# Patient Record
Sex: FEMALE | URBAN - METROPOLITAN AREA
[De-identification: names, ages, dates, MRNs, and addresses within clinical notes are randomized per-mention and may not be internally consistent; named-entity substitution may affect disease eponyms.]

---

## 2023-01-18 ENCOUNTER — INPATIENT (INPATIENT)
Facility: HOSPITAL | Age: 83
LOS: 6 days | Discharge: NURSING, REHAB OR LONG-TERM CARE | DRG: 871 | End: 2023-01-25
Attending: HOSPITALIST | Admitting: STUDENT IN AN ORGANIZED HEALTH CARE EDUCATION/TRAINING PROGRAM
Payer: MEDICARE

## 2023-01-18 VITALS
RESPIRATION RATE: 18 BRPM | SYSTOLIC BLOOD PRESSURE: 117 MMHG | DIASTOLIC BLOOD PRESSURE: 70 MMHG | TEMPERATURE: 98 F | HEART RATE: 99 BPM | OXYGEN SATURATION: 94 %

## 2023-01-18 DIAGNOSIS — Z98.891 HISTORY OF UTERINE SCAR FROM PREVIOUS SURGERY: Chronic | ICD-10-CM

## 2023-01-18 DIAGNOSIS — Z96.651 PRESENCE OF RIGHT ARTIFICIAL KNEE JOINT: Chronic | ICD-10-CM

## 2023-01-18 DIAGNOSIS — Z90.710 ACQUIRED ABSENCE OF BOTH CERVIX AND UTERUS: Chronic | ICD-10-CM

## 2023-01-18 DIAGNOSIS — H26.9 UNSPECIFIED CATARACT: Chronic | ICD-10-CM

## 2023-01-18 DIAGNOSIS — Z86.018 PERSONAL HISTORY OF OTHER BENIGN NEOPLASM: Chronic | ICD-10-CM

## 2023-01-18 LAB
ALBUMIN SERPL ELPH-MCNC: 3.2 G/DL — LOW (ref 3.3–5)
ALP SERPL-CCNC: 157 U/L — HIGH (ref 40–120)
ALT FLD-CCNC: 12 U/L — SIGNIFICANT CHANGE UP (ref 10–45)
ANION GAP SERPL CALC-SCNC: 18 MMOL/L — HIGH (ref 5–17)
APTT BLD: 33.7 SEC — SIGNIFICANT CHANGE UP (ref 27.5–35.5)
AST SERPL-CCNC: 40 U/L — SIGNIFICANT CHANGE UP (ref 10–40)
BASE EXCESS BLDV CALC-SCNC: -3.4 MMOL/L — LOW (ref -2–3)
BASOPHILS # BLD AUTO: 0.03 K/UL — SIGNIFICANT CHANGE UP (ref 0–0.2)
BASOPHILS NFR BLD AUTO: 0.2 % — SIGNIFICANT CHANGE UP (ref 0–2)
BILIRUB SERPL-MCNC: 1 MG/DL — SIGNIFICANT CHANGE UP (ref 0.2–1.2)
BUN SERPL-MCNC: 24 MG/DL — HIGH (ref 7–23)
CA-I SERPL-SCNC: 1.09 MMOL/L — LOW (ref 1.15–1.33)
CALCIUM SERPL-MCNC: 8.7 MG/DL — SIGNIFICANT CHANGE UP (ref 8.4–10.5)
CHLORIDE BLDV-SCNC: 95 MMOL/L — LOW (ref 96–108)
CHLORIDE SERPL-SCNC: 93 MMOL/L — LOW (ref 96–108)
CO2 BLDV-SCNC: 22 MMOL/L — SIGNIFICANT CHANGE UP (ref 22–26)
CO2 SERPL-SCNC: 19 MMOL/L — LOW (ref 22–31)
CREAT SERPL-MCNC: 1.9 MG/DL — HIGH (ref 0.5–1.3)
EGFR: 26 ML/MIN/1.73M2 — LOW
EOSINOPHIL # BLD AUTO: 0.08 K/UL — SIGNIFICANT CHANGE UP (ref 0–0.5)
EOSINOPHIL NFR BLD AUTO: 0.6 % — SIGNIFICANT CHANGE UP (ref 0–6)
FLUAV AG NPH QL: SIGNIFICANT CHANGE UP
FLUBV AG NPH QL: SIGNIFICANT CHANGE UP
GAS PNL BLDV: 129 MMOL/L — LOW (ref 136–145)
GAS PNL BLDV: SIGNIFICANT CHANGE UP
GLUCOSE BLDV-MCNC: 192 MG/DL — HIGH (ref 70–99)
GLUCOSE SERPL-MCNC: 192 MG/DL — HIGH (ref 70–99)
HCO3 BLDV-SCNC: 21 MMOL/L — LOW (ref 22–29)
HCT VFR BLD CALC: 36.1 % — SIGNIFICANT CHANGE UP (ref 34.5–45)
HCT VFR BLDA CALC: 36 % — SIGNIFICANT CHANGE UP (ref 34.5–46.5)
HGB BLD CALC-MCNC: 12.1 G/DL — SIGNIFICANT CHANGE UP (ref 11.7–16.1)
HGB BLD-MCNC: 11.6 G/DL — SIGNIFICANT CHANGE UP (ref 11.5–15.5)
IMM GRANULOCYTES NFR BLD AUTO: 1.2 % — HIGH (ref 0–0.9)
INR BLD: 1.23 RATIO — HIGH (ref 0.88–1.16)
LACTATE BLDV-MCNC: 1.7 MMOL/L — SIGNIFICANT CHANGE UP (ref 0.5–2)
LIDOCAIN IGE QN: 20 U/L — SIGNIFICANT CHANGE UP (ref 7–60)
LYMPHOCYTES # BLD AUTO: 1.47 K/UL — SIGNIFICANT CHANGE UP (ref 1–3.3)
LYMPHOCYTES # BLD AUTO: 11.4 % — LOW (ref 13–44)
MAGNESIUM SERPL-MCNC: 2.1 MG/DL — SIGNIFICANT CHANGE UP (ref 1.6–2.6)
MCHC RBC-ENTMCNC: 29.6 PG — SIGNIFICANT CHANGE UP (ref 27–34)
MCHC RBC-ENTMCNC: 32.1 GM/DL — SIGNIFICANT CHANGE UP (ref 32–36)
MCV RBC AUTO: 92.1 FL — SIGNIFICANT CHANGE UP (ref 80–100)
MONOCYTES # BLD AUTO: 1.18 K/UL — HIGH (ref 0–0.9)
MONOCYTES NFR BLD AUTO: 9.2 % — SIGNIFICANT CHANGE UP (ref 2–14)
NEUTROPHILS # BLD AUTO: 9.93 K/UL — HIGH (ref 1.8–7.4)
NEUTROPHILS NFR BLD AUTO: 77.4 % — HIGH (ref 43–77)
NRBC # BLD: 0 /100 WBCS — SIGNIFICANT CHANGE UP (ref 0–0)
PCO2 BLDV: 37 MMHG — LOW (ref 39–42)
PH BLDV: 7.37 — SIGNIFICANT CHANGE UP (ref 7.32–7.43)
PLATELET # BLD AUTO: 378 K/UL — SIGNIFICANT CHANGE UP (ref 150–400)
PO2 BLDV: 34 MMHG — SIGNIFICANT CHANGE UP (ref 25–45)
POTASSIUM BLDV-SCNC: 4 MMOL/L — SIGNIFICANT CHANGE UP (ref 3.5–5.1)
POTASSIUM SERPL-MCNC: 3.9 MMOL/L — SIGNIFICANT CHANGE UP (ref 3.5–5.3)
POTASSIUM SERPL-SCNC: 3.9 MMOL/L — SIGNIFICANT CHANGE UP (ref 3.5–5.3)
PROT SERPL-MCNC: 7.9 G/DL — SIGNIFICANT CHANGE UP (ref 6–8.3)
PROTHROM AB SERPL-ACNC: 14.3 SEC — HIGH (ref 10.5–13.4)
RBC # BLD: 3.92 M/UL — SIGNIFICANT CHANGE UP (ref 3.8–5.2)
RBC # FLD: 15.5 % — HIGH (ref 10.3–14.5)
RSV RNA NPH QL NAA+NON-PROBE: SIGNIFICANT CHANGE UP
SAO2 % BLDV: 52 % — LOW (ref 67–88)
SARS-COV-2 RNA SPEC QL NAA+PROBE: SIGNIFICANT CHANGE UP
SODIUM SERPL-SCNC: 130 MMOL/L — LOW (ref 135–145)
WBC # BLD: 12.84 K/UL — HIGH (ref 3.8–10.5)
WBC # FLD AUTO: 12.84 K/UL — HIGH (ref 3.8–10.5)

## 2023-01-18 PROCEDURE — 99285 EMERGENCY DEPT VISIT HI MDM: CPT | Mod: FS

## 2023-01-18 PROCEDURE — 76705 ECHO EXAM OF ABDOMEN: CPT | Mod: 26

## 2023-01-18 PROCEDURE — 74176 CT ABD & PELVIS W/O CONTRAST: CPT | Mod: 26,MA

## 2023-01-18 PROCEDURE — 71045 X-RAY EXAM CHEST 1 VIEW: CPT | Mod: 26

## 2023-01-18 RX ORDER — ERTAPENEM SODIUM 1 G/1
500 INJECTION, POWDER, LYOPHILIZED, FOR SOLUTION INTRAMUSCULAR; INTRAVENOUS ONCE
Refills: 0 | Status: COMPLETED | OUTPATIENT
Start: 2023-01-18 | End: 2023-01-18

## 2023-01-18 RX ORDER — ACETAMINOPHEN 500 MG
1000 TABLET ORAL ONCE
Refills: 0 | Status: COMPLETED | OUTPATIENT
Start: 2023-01-18 | End: 2023-01-18

## 2023-01-18 RX ADMIN — Medication 1000 MILLIGRAM(S): at 22:04

## 2023-01-18 RX ADMIN — Medication 400 MILLIGRAM(S): at 21:34

## 2023-01-18 RX ADMIN — ERTAPENEM SODIUM 100 MILLIGRAM(S): 1 INJECTION, POWDER, LYOPHILIZED, FOR SOLUTION INTRAMUSCULAR; INTRAVENOUS at 22:16

## 2023-01-18 NOTE — ED PROVIDER NOTE - ATTENDING APP SHARED VISIT CONTRIBUTION OF CARE
81 y/o female PMHx CHF, hypothyroidism, anxiety, HTN, HLD, vertigo, DM, and gastritis brought in via EMS for worsening right sided flank/RUQ pain x2 days with bedside us with gb dilated with no stone but slude, wall thickened borderline, no fevers, check official us, ct, labs, ua, possible choley for bilary colic, no cva tend, no hydronephrosis. 83 y/o female PMHx CHF, hypothyroidism, anxiety, HTN, HLD, vertigo, DM, and gastritis brought in via EMS for worsening right sided flank/RUQ pain x2 days with bedside us with gb dilated with no stone but slude, wall thickened borderline, no fevers, check official us, ct, labs, ua, possible choley for bilary colic, no cva tend, no hydronephrosis.

## 2023-01-18 NOTE — CONSULT NOTE ADULT - SUBJECTIVE AND OBJECTIVE BOX
History per daughter Dorota.  82F PMHx early dementia, CHF, hypothyroidism, anxiety, HTN, HLD, vertigo, DM, and gastritis with recent hospitalization 2022 at [facility] for urosepsis (fungal) requiring intubation. 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 Discharged  and at a rehab facility. Daughter reports pt's functional capacity declined severely since hospitalization. She now uses a wheelchair and has lost weight with poor appetite. Pt's care has been at [facility], but her PCP in rehab Dr. Santos recommended she come to [facility]. Pt has worsening r-sided abd pain since . Imaging suggests acute cholecystitis.      PAST MEDICAL & SURGICAL HISTORY:  H/O CHF      Hypothyroidism      H/O: HTN (hypertension)      HLD (hyperlipidemia)      H/O:           MEDICATIONS  (STANDING):  dextrose 5%. 1000 milliLiter(s) (100 mL/Hr) IV Continuous <Continuous>  dextrose 5%. 1000 milliLiter(s) (50 mL/Hr) IV Continuous <Continuous>  dextrose 50% Injectable 25 Gram(s) IV Push once  dextrose 50% Injectable 12.5 Gram(s) IV Push once  dextrose 50% Injectable 25 Gram(s) IV Push once  ertapenem  IVPB 500 milliGRAM(s) IV Intermittent every 24 hours  glucagon  Injectable 1 milliGRAM(s) IntraMuscular once  insulin lispro (ADMELOG) corrective regimen sliding scale   SubCutaneous every 6 hours    MEDICATIONS  (PRN):  dextrose Oral Gel 15 Gram(s) Oral once PRN Blood Glucose LESS THAN 70 milliGRAM(s)/deciliter      Allergies    No Known Allergies    Intolerances        Physical Exam:  General: fatigued, responds to questions  HEENT: NC/AT, EOMI  Pulmonary: normal resp effort, CTA-B  Abdominal: soft, ND, +[place]  Extremities: WWP, normal strength, no clubbing/cyanosis/edema  Neuro: CNs II-XII grossly intact, normal sensation, no focal deficits    Vital Signs Last 24 Hrs  T(C): 36.6 (2023 01:29), Max: 37.4 (2023 17:48)  T(F): 97.8 (2023 01:29), Max: 99.4 (2023 17:48)  HR: 91 (2023 01:29) (91 - 99)  BP: 104/57 (2023 01:29) (104/57 - 117/70)  BP(mean): 73 (2023 01:29) (73 - 73)  RR: 18 (2023 01:29) (18 - 20)  SpO2: 93% (2023 01:29) (93% - 94%)    Parameters below as of 2023 01:29  Patient On (Oxygen Delivery Method): room air        I&O's Summary          LABS:                        11.6   12.84 )-----------( 378      ( 2023 18:33 )             36.1     18    130<L>  |  93<L>  |  24<H>  ----------------------------<  192<H>  3.9   |  19<L>  |  1.90<H>    Ca    8.7      2023 18:33  Phos  4.2       Mg     2.1         TPro  7.9  /  Alb  3.2<L>  /  TBili  1.0  /  DBili  x   /  AST  40  /  ALT  12  /  AlkPhos  157<H>  18    PT/INR - ( 2023 21:55 )   PT: 14.3 sec;   INR: 1.23 ratio         PTT - ( 2023 21:55 )  PTT:33.7 sec    CAPILLARY BLOOD GLUCOSE      POCT Blood Glucose.: 181 mg/dL (2023 22:21)    LIVER FUNCTIONS - ( 2023 18:33 )  Alb: 3.2 g/dL / Pro: 7.9 g/dL / ALK PHOS: 157 U/L / ALT: 12 U/L / AST: 40 U/L / GGT: x           RADIOLOGY & ADDITIONAL STUDIES:  < from: CT Abdomen and Pelvis No Cont (23 @ 19:40) >  ACC: [id number] EXAM:  CT ABDOMEN AND PELVIS                          PROCEDURE DATE:  2023          INTERPRETATION:  CLINICAL INFORMATION: Right flank pain. Right upper   quadrant pain for 2 days.    < end of copied text >  < from: CT Abdomen and Pelvis No Cont (23 @ 19:40) >  IMPRESSION:    Limited noncontrast study.    Gallbladder distention with marked wall thickening and surrounding   inflammatory changes/fluid. Probable layering sludge. Findings are   concerning for acute cholecystitis. Correlate with same day ultrasound.    Retained contrast of bilateral renal parenchyma likelyreflects degree of   renal dysfunction/nephropathy, correlate for date of contrast   administration. Striated appearance of the kidneys, right greater than   left likely reflects infection/pyelonephritis.    Severe anterior wedging deformity of L3 vertebral body, favored to be   chronic. Correlate with clinical symptoms.    3 mm right middle lobe nodule can be followed dedicated chest CT in 12   months, or as per patient risk factors.    Additional findings as above. Please read above.      < end of copied text >  < from: US Abdomen Upper Quadrant Right (23 @ 19:02) >  ACC: [id number] EXAM:  US ABDOMEN RT UPR QUADRANT                          PROCEDURE DATE:  2023      < end of copied text >  < from: US Abdomen Upper Quadrant Right (23 @ 19:02) >  IMPRESSION:  Distended gallbladder and gallbladder wall thickening in the setting of   positive sonographic Pond sign compatible with acute cholecystitis.  Small right upper quadrant ascites.  Small right pleural effusion.      < end of copied text >   History per daughter Dorota.  82F PMHx early dementia, CHF, hypothyroidism, anxiety, HTN, HLD, vertigo, DM, and gastritis with recent hospitalization 2022 at [facility] for urosepsis (fungal) requiring intubation. 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 Discharged  and at a rehab facility. Daughter reports pt's functional capacity declined severely since hospitalization. She now uses a wheelchair and has lost weight with poor appetite. Pt's care has been at [facility], but her PCP in rehab Dr. Santos recommended she come to [facility]. Pt has worsening r-sided abd pain since . Imaging suggests acute cholecystitis.      PAST MEDICAL & SURGICAL HISTORY:  H/O CHF      Hypothyroidism      H/O: HTN (hypertension)      HLD (hyperlipidemia)      H/O:           MEDICATIONS  (STANDING):  dextrose 5%. 1000 milliLiter(s) (100 mL/Hr) IV Continuous <Continuous>  dextrose 5%. 1000 milliLiter(s) (50 mL/Hr) IV Continuous <Continuous>  dextrose 50% Injectable 25 Gram(s) IV Push once  dextrose 50% Injectable 12.5 Gram(s) IV Push once  dextrose 50% Injectable 25 Gram(s) IV Push once  ertapenem  IVPB 500 milliGRAM(s) IV Intermittent every 24 hours  glucagon  Injectable 1 milliGRAM(s) IntraMuscular once  insulin lispro (ADMELOG) corrective regimen sliding scale   SubCutaneous every 6 hours    MEDICATIONS  (PRN):  dextrose Oral Gel 15 Gram(s) Oral once PRN Blood Glucose LESS THAN 70 milliGRAM(s)/deciliter      Allergies    No Known Allergies    Intolerances        Physical Exam:  General: fatigued, responds to questions  HEENT: NC/AT, EOMI  Pulmonary: normal resp effort, CTA-B  Abdominal: soft, ND, +[place]  Extremities: WWP, normal strength, no clubbing/cyanosis/edema  Neuro: CNs II-XII grossly intact, normal sensation, no focal deficits    Vital Signs Last 24 Hrs  T(C): 36.6 (2023 01:29), Max: 37.4 (2023 17:48)  T(F): 97.8 (2023 01:29), Max: 99.4 (2023 17:48)  HR: 91 (2023 01:29) (91 - 99)  BP: 104/57 (2023 01:29) (104/57 - 117/70)  BP(mean): 73 (2023 01:29) (73 - 73)  RR: 18 (2023 01:29) (18 - 20)  SpO2: 93% (2023 01:29) (93% - 94%)    Parameters below as of 2023 01:29  Patient On (Oxygen Delivery Method): room air        I&O's Summary          LABS:                        11.6   12.84 )-----------( 378      ( 2023 18:33 )             36.1     18    130<L>  |  93<L>  |  24<H>  ----------------------------<  192<H>  3.9   |  19<L>  |  1.90<H>    Ca    8.7      2023 18:33  Phos  4.2       Mg     2.1         TPro  7.9  /  Alb  3.2<L>  /  TBili  1.0  /  DBili  x   /  AST  40  /  ALT  12  /  AlkPhos  157<H>  18    PT/INR - ( 2023 21:55 )   PT: 14.3 sec;   INR: 1.23 ratio         PTT - ( 2023 21:55 )  PTT:33.7 sec    CAPILLARY BLOOD GLUCOSE      POCT Blood Glucose.: 181 mg/dL (2023 22:21)    LIVER FUNCTIONS - ( 2023 18:33 )  Alb: 3.2 g/dL / Pro: 7.9 g/dL / ALK PHOS: 157 U/L / ALT: 12 U/L / AST: 40 U/L / GGT: x           RADIOLOGY & ADDITIONAL STUDIES:  < from: CT Abdomen and Pelvis No Cont (23 @ 19:40) >  ACC: [id number] EXAM:  CT ABDOMEN AND PELVIS                          PROCEDURE DATE:  2023          INTERPRETATION:  CLINICAL INFORMATION: Right flank pain. Right upper   quadrant pain for 2 days.    < end of copied text >  < from: CT Abdomen and Pelvis No Cont (23 @ 19:40) >  IMPRESSION:    Limited noncontrast study.    Gallbladder distention with marked wall thickening and surrounding   inflammatory changes/fluid. Probable layering sludge. Findings are   concerning for acute cholecystitis. Correlate with same day ultrasound.    Retained contrast of bilateral renal parenchyma likelyreflects degree of   renal dysfunction/nephropathy, correlate for date of contrast   administration. Striated appearance of the kidneys, right greater than   left likely reflects infection/pyelonephritis.    Severe anterior wedging deformity of L3 vertebral body, favored to be   chronic. Correlate with clinical symptoms.    3 mm right middle lobe nodule can be followed dedicated chest CT in 12   months, or as per patient risk factors.    Additional findings as above. Please read above.      < end of copied text >  < from: US Abdomen Upper Quadrant Right (23 @ 19:02) >  ACC: [id number] EXAM:  US ABDOMEN RT UPR QUADRANT                          PROCEDURE DATE:  2023      < end of copied text >  < from: US Abdomen Upper Quadrant Right (23 @ 19:02) >  IMPRESSION:  Distended gallbladder and gallbladder wall thickening in the setting of   positive sonographic Pond sign compatible with acute cholecystitis.  Small right upper quadrant ascites.  Small right pleural effusion.      < end of copied text >   History per daughter Dorota.  82F PMHx early dementia, CHF, hypothyroidism, anxiety, HTN, HLD, vertigo, DM, and gastritis with recent hospitalization 2022 at [facility] for urosepsis (fungal) requiring intubation. 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 Discharged  and at a rehab facility. Daughter reports pt's functional capacity declined severely since hospitalization. She now uses a wheelchair and has lost weight with poor appetite. Pt's care has been at [facility], but her PCP in rehab Dr. Santos recommended she come to [facility]. Pt has worsening r-sided abd pain since . Imaging suggests acute cholecystitis.      PAST MEDICAL & SURGICAL HISTORY:  H/O CHF      Hypothyroidism      H/O: HTN (hypertension)      HLD (hyperlipidemia)      H/O:           MEDICATIONS  (STANDING):  dextrose 5%. 1000 milliLiter(s) (100 mL/Hr) IV Continuous <Continuous>  dextrose 5%. 1000 milliLiter(s) (50 mL/Hr) IV Continuous <Continuous>  dextrose 50% Injectable 25 Gram(s) IV Push once  dextrose 50% Injectable 12.5 Gram(s) IV Push once  dextrose 50% Injectable 25 Gram(s) IV Push once  ertapenem  IVPB 500 milliGRAM(s) IV Intermittent every 24 hours  glucagon  Injectable 1 milliGRAM(s) IntraMuscular once  insulin lispro (ADMELOG) corrective regimen sliding scale   SubCutaneous every 6 hours    MEDICATIONS  (PRN):  dextrose Oral Gel 15 Gram(s) Oral once PRN Blood Glucose LESS THAN 70 milliGRAM(s)/deciliter      Allergies    No Known Allergies    Intolerances        Physical Exam:  General: fatigued, responds to questions  HEENT: NC/AT, EOMI  Pulmonary: normal resp effort, CTA-B  Abdominal: soft, ND, +[place]  Extremities: WWP, normal strength, no clubbing/cyanosis/edema  Neuro: CNs II-XII grossly intact, normal sensation, no focal deficits    Vital Signs Last 24 Hrs  T(C): 36.6 (2023 01:29), Max: 37.4 (2023 17:48)  T(F): 97.8 (2023 01:29), Max: 99.4 (2023 17:48)  HR: 91 (2023 01:29) (91 - 99)  BP: 104/57 (2023 01:29) (104/57 - 117/70)  BP(mean): 73 (2023 01:29) (73 - 73)  RR: 18 (2023 01:29) (18 - 20)  SpO2: 93% (2023 01:29) (93% - 94%)    Parameters below as of 2023 01:29  Patient On (Oxygen Delivery Method): room air        I&O's Summary          LABS:                        11.6   12.84 )-----------( 378      ( 2023 18:33 )             36.1     18    130<L>  |  93<L>  |  24<H>  ----------------------------<  192<H>  3.9   |  19<L>  |  1.90<H>    Ca    8.7      2023 18:33  Phos  4.2       Mg     2.1         TPro  7.9  /  Alb  3.2<L>  /  TBili  1.0  /  DBili  x   /  AST  40  /  ALT  12  /  AlkPhos  157<H>  18    PT/INR - ( 2023 21:55 )   PT: 14.3 sec;   INR: 1.23 ratio         PTT - ( 2023 21:55 )  PTT:33.7 sec    CAPILLARY BLOOD GLUCOSE      POCT Blood Glucose.: 181 mg/dL (2023 22:21)    LIVER FUNCTIONS - ( 2023 18:33 )  Alb: 3.2 g/dL / Pro: 7.9 g/dL / ALK PHOS: 157 U/L / ALT: 12 U/L / AST: 40 U/L / GGT: x           RADIOLOGY & ADDITIONAL STUDIES:  < from: CT Abdomen and Pelvis No Cont (23 @ 19:40) >  ACC: [id number] EXAM:  CT ABDOMEN AND PELVIS                          PROCEDURE DATE:  2023          INTERPRETATION:  CLINICAL INFORMATION: Right flank pain. Right upper   quadrant pain for 2 days.    < end of copied text >  < from: CT Abdomen and Pelvis No Cont (23 @ 19:40) >  IMPRESSION:    Limited noncontrast study.    Gallbladder distention with marked wall thickening and surrounding   inflammatory changes/fluid. Probable layering sludge. Findings are   concerning for acute cholecystitis. Correlate with same day ultrasound.    Retained contrast of bilateral renal parenchyma likelyreflects degree of   renal dysfunction/nephropathy, correlate for date of contrast   administration. Striated appearance of the kidneys, right greater than   left likely reflects infection/pyelonephritis.    Severe anterior wedging deformity of L3 vertebral body, favored to be   chronic. Correlate with clinical symptoms.    3 mm right middle lobe nodule can be followed dedicated chest CT in 12   months, or as per patient risk factors.    Additional findings as above. Please read above.      < end of copied text >  < from: US Abdomen Upper Quadrant Right (23 @ 19:02) >  ACC: [id number] EXAM:  US ABDOMEN RT UPR QUADRANT                          PROCEDURE DATE:  2023      < end of copied text >  < from: US Abdomen Upper Quadrant Right (23 @ 19:02) >  IMPRESSION:  Distended gallbladder and gallbladder wall thickening in the setting of   positive sonographic Pond sign compatible with acute cholecystitis.  Small right upper quadrant ascites.  Small right pleural effusion.      < end of copied text >

## 2023-01-18 NOTE — ED PROVIDER NOTE - NSICDXPASTMEDICALHX_GEN_ALL_CORE_FT
PAST MEDICAL HISTORY:  H/O CHF     H/O: HTN (hypertension)     HLD (hyperlipidemia)     Hypothyroidism

## 2023-01-18 NOTE — ED PROCEDURE NOTE - NS ED ATTENDING STATEMENT MOD
This was a shared visit with the NATHALIE. I reviewed and verified the documentation and independently performed the documented:
This was a shared visit with the NATHALIE. I reviewed and verified the documentation and independently performed the documented:

## 2023-01-18 NOTE — ED PROVIDER NOTE - INTERPRETATION
normal sinus rhythm, Normal axis, Normal RI interval and QRS complex. There are no acute ischemic ST or T-wave changes. normal sinus rhythm, Normal axis, Normal ME interval and QRS complex. There are no acute ischemic ST or T-wave changes. normal sinus rhythm, Normal axis, Normal LA interval and QRS complex. There are no acute ischemic ST or T-wave changes.

## 2023-01-18 NOTE — ED PROVIDER NOTE - OBJECTIVE STATEMENT
83 y/o female PMHx CHF, hypothyroidism, anxiety, HTN, HLD, vertigo, DM, and gastritis brought in via EMS for worsening right sided flank/RUQ pain x2 days. Patient reported the pain is constant and worsening with some nausea. Patient reports movement makes the pain worse. Chills but unsure of fever. Denied vomiting, changes in stool color or texture, CP, SOB, diarrhea, urinary symptoms, fever, headache, changes in skin color, bruising, fall or trauma 81 y/o female PMHx CHF, hypothyroidism, anxiety, HTN, HLD, vertigo, DM, and gastritis brought in via EMS for worsening right sided flank/RUQ pain x2 days. Patient reported the pain is constant and worsening with some nausea. Patient reports movement makes the pain worse. Chills but unsure of fever. Denied vomiting, changes in stool color or texture, CP, SOB, diarrhea, urinary symptoms, fever, headache, changes in skin color, bruising, fall or trauma

## 2023-01-18 NOTE — CONSULT NOTE ADULT - ASSESSMENT
82F PMHx early dementia, CHF, hypothyroidism, anxiety, HTN, HLD, vertigo, DM, and gastritis with recent hospitalization 11/2022 at [facility] for urosepsis (fungal) requiring intubation. 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 Discharged 12/5 and at a rehab facility. Daughter reports pt's functional capacity declined severely since hospitalization. She now uses a wheelchair and has lost weight with poor appetite. Pt's care has been at [facility], but her PCP in rehab Dr. Santos recommended she come to [facility]. Pt has worsening r-sided abd pain since Sunday. Imaging suggests acute cholecystitis.    PLAN  - No acute surgical intervention given pt comorbidities  - Need cardiac and medical optimization prior to any potential OR  - If too high risk, could consider IR perc cholecystostomy  - Monitor on IV abx  - GOC discussion with family    Discussed with Dr. HALINA Eng    Red Surgery  [id number]   82F PMHx early dementia, CHF, hypothyroidism, anxiety, HTN, HLD, vertigo, DM, and gastritis with recent hospitalization 11/2022 at [facility] for urosepsis (fungal) requiring intubation. 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 Discharged 12/5 and at a rehab facility. Daughter reports pt's functional capacity declined severely since hospitalization. She now uses a wheelchair and has lost weight with poor appetite. Pt's care has been at [facility], but her PCP in rehab Dr. Santos recommended she come to [facility]. Pt has worsening r-sided abd pain since Sunday. Imaging suggests acute cholecystitis.    PLAN  - No acute surgical intervention given pt comorbidities  - Need cardiac and medical optimization prior to any potential OR  - If too high risk, could consider IR perc cholecystostomy  - Monitor on IV abx  - GOC discussion with family    Discussed with Dr. HALINA Eng    Red Surgery  [id number]   82F PMHx early dementia, CHF, hypothyroidism, anxiety, HTN, HLD, vertigo, DM, and gastritis with recent hospitalization 11/2022 at [facility] for urosepsis (fungal) requiring intubation. 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 Discharged 12/5 and at a rehab facility. Daughter reports pt's functional capacity declined severely since hospitalization. She now uses a wheelchair and has lost weight with poor appetite. Pt's care has been at [facility], but her PCP in rehab Dr. Santos recommended she come to [facility]. Pt has worsening r-sided abd pain since Sunday. Imaging suggests acute cholecystitis.    PLAN  - No acute surgical intervention given pt comorbidities  - Need cardiac and medical optimization prior to any potential OR  - If too high risk, could consider IR perc cholecystostomy  - Monitor on IV abx  - GOC discussion with family    Discussed with Dr. HALINA Eng    Red Surgery  [id number]

## 2023-01-18 NOTE — ED PROCEDURE NOTE - PROCEDURE ADDITIONAL DETAILS
Emergency Department Focused Ultrasound performed at patient's bedside for educational purposes. An appropriate follow up study is ordered. -Jannet Bliss PA-C
Peripheral IV access in the Emergency Department obtained under dynamic ultrasound guidance with dark nonpulsatile blood return.  Catheter was flushed afterwards without any resistance or resulting extravasation.  IV catheter confirmed in compressible vein after insertion. 18 gauge catheter placed in a peripheral vein in the left upper extremity.

## 2023-01-18 NOTE — ED ADULT NURSE NOTE - NSIMPLEMENTINTERV_GEN_ALL_ED
Implemented All Fall Risk Interventions:  [place] to call system. Call bell, personal items and telephone within reach. Instruct patient to call for assistance. Room bathroom lighting operational. Non-slip footwear when patient is off stretcher. Physically safe environment: no spills, clutter or unnecessary equipment. Stretcher in lowest position, wheels locked, appropriate side rails in place. Provide visual cue, wrist band, yellow gown, etc. Monitor gait and stability. Monitor for mental status changes and reorient to person, place, and time. Review medications for side effects contributing to fall risk. Reinforce activity limits and safety measures with patient and family. Implemented All Fall Risk Interventions:  [place] to call system. Call bell, personal items and telephone within reach. Instruct patient to call for assistance. Room bathroom lighting operational. Non-slip footwear when patient is off stretcher. Physically safe environment: no spills, clutter or unnecessary equipment. Stretcher in lowest position, wheels locked, appropriate side rails in place. Provide visual cue, wrist band, yellow gown, etc. Monitor gait and stability. Monitor for mental status changes and reorient to person, place, and time. Review medications for side effects contributing to fall risk. Reinforce activity limits and safety measures with patient and family. Implemented All Fall Risk Interventions:  [place] to call system. Call bell, personal items and telephone within reach. Instruct patient to call for assistance. Room bathroom lighting operational. Non-slip footwear when patient is off stretcher. Physically safe environment: no spills, clutter or unnecessary equipment. Stretcher in lowest position, wheels locked, appropriate side rails in place. Provide visual cue, wrist band, yellow gown, etc. Monitor gait and stability. Monitor for mental status changes and reorient to person, place, and time. Review medications for side effects contributing to fall risk. Reinforce activity limits and safety measures with patient and family.

## 2023-01-18 NOTE — ED PROVIDER NOTE - PROGRESS NOTE DETAILS
JESSICA Martinez: spoke to pmd Dr. Munoz [phone] who treated pt on invanz 2 weeks for uti and ordered RUQ US two days with results pending. pt sent to hospital for worsening pain concerning or cholecystitis JESSICA Martinez: spoke to pmd Dr. Munoz [phone] who treated pt on invanz 2 weeks for uti and ordered RUQ US two days with results pending. pt sent to hospital for worsening pain concerning or cholecystitis JESSICA Martinez: spoke to pmd Dr. Munoz [phone] who treated pt on invanz 2 weeks for uti and ordered RUQ US two days with results pending. pt sent to hospital for worsening pain concerning or cholecystitis JESSICA Martinez: Tammy aware of radiology findings. paged surgery

## 2023-01-18 NOTE — CONSULT NOTE ADULT - ATTENDING COMMENTS
date of service 1/18/23    pt seen and examined  pt chart and imaging reviewed in detail  agree with note above  82F PMHx early dementia, CHF, hypothyroidism, anxiety, HTN, HLD, vertigo, DM, and gastritis with recent hospitalization 11/2022 at [facility] for urosepsis (fungal) requiring intubation. 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 Discharged 12/5 and at a rehab facility. Daughter reports pt's functional capacity declined severely since hospitalization. She now uses a wheelchair and has lost weight with poor appetite. Pt's care has been at [facility], but her PCP in rehab Dr. Santos recommended she come to [facility]. Pt has worsening r-sided abd pain since Sunday. Imaging suggests acute cholecystitis.   pt is poor operative candidate at this time.  agree with admit to medicine  cont npo, ivf, iv abx  serial abd exams  f/u labs in am  cont supportive care per med/cv  consider IR consult for perc cynthia  will follow with you  d/w pt & family @ bedside in detail. date of service 1/18/23    pt seen and examined  pt chart and imaging reviewed in detail  agree with note above  82F PMHx early dementia, CHF, hypothyroidism, anxiety, HTN, HLD, vertigo, DM, and gastritis with recent hospitalization 11/2022 at [facility] for urosepsis (fungal) requiring intubation. 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 Discharged 12/5 and at a rehab facility. Daughter reports pt's functional capacity declined severely since hospitalization. She now uses a wheelchair and has lost weight with poor appetite. Pt's care has been at [facility], but her PCP in rehab Dr. Santos recommended she come to [facility]. Pt has worsening r-sided abd pain since Sunday. Imaging suggests acute cholecystitis.   pt is poor operative candidate at this time.  agree with admit to medicine  cont npo, ivf, iv abx  serial abd exams  f/u labs in am  cont supportive care per med/cv  consider IR consult for perc cynthia  will follow with you  d/w pt & family @ bedside in detail. date of service 1/18/23    pt seen and examined  pt chart and imaging reviewed in detail  agree with note above  82F PMHx early dementia, CHF, hypothyroidism, anxiety, HTN, HLD, vertigo, DM, and gastritis with recent hospitalization 11/2022 at [facility] for urosepsis (fungal) requiring intubation. 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 Discharged 12/5 and at a rehab facility. Daughter reports pt's functional capacity declined severely since hospitalization. She now uses a wheelchair and has lost weight with poor appetite. Pt's care has been at [facility], but her PCP in rehab Dr. Santos recommended she come to [facility]. Pt has worsening r-sided abd pain since Sunday. Imaging suggests acute cholecystitis.   pt is poor operative candidate at this time.  agree with admit to medicine  cont npo, ivf, iv abx  serial abd exams  f/u labs in am  cont supportive care per med/cv  consider IR consult for perc cynthia  will follow with you  d/w pt & family @ bedside in detail.

## 2023-01-18 NOTE — ED ADULT NURSE NOTE - OBJECTIVE STATEMENT
Pt is a 83 yo female with the co abdominal pain since Monday. Pt states that she has been having lower abdominal pain radiating to the right flank area/ Pt reports intermittent nausea with the pain but denies any vomiting or diarrhea. Pt denies any CP or SOB no cough fever. some chills. Pt denies any recent travel or sick contacts.

## 2023-01-19 DIAGNOSIS — I67.9 CEREBROVASCULAR DISEASE, UNSPECIFIED: ICD-10-CM

## 2023-01-19 DIAGNOSIS — Z29.9 ENCOUNTER FOR PROPHYLACTIC MEASURES, UNSPECIFIED: ICD-10-CM

## 2023-01-19 DIAGNOSIS — E11.9 TYPE 2 DIABETES MELLITUS WITHOUT COMPLICATIONS: ICD-10-CM

## 2023-01-19 DIAGNOSIS — K81.9 CHOLECYSTITIS, UNSPECIFIED: ICD-10-CM

## 2023-01-19 DIAGNOSIS — E03.9 HYPOTHYROIDISM, UNSPECIFIED: ICD-10-CM

## 2023-01-19 DIAGNOSIS — Z86.79 PERSONAL HISTORY OF OTHER DISEASES OF THE CIRCULATORY SYSTEM: ICD-10-CM

## 2023-01-19 DIAGNOSIS — R91.8 OTHER NONSPECIFIC ABNORMAL FINDING OF LUNG FIELD: ICD-10-CM

## 2023-01-19 DIAGNOSIS — K81.0 ACUTE CHOLECYSTITIS: ICD-10-CM

## 2023-01-19 DIAGNOSIS — R63.4 ABNORMAL WEIGHT LOSS: ICD-10-CM

## 2023-01-19 DIAGNOSIS — K81.0 ACUTE CHOLECYSTITIS: Chronic | ICD-10-CM

## 2023-01-19 DIAGNOSIS — Z87.448 PERSONAL HISTORY OF OTHER DISEASES OF URINARY SYSTEM: ICD-10-CM

## 2023-01-19 LAB
ALBUMIN SERPL ELPH-MCNC: 2.9 G/DL — LOW (ref 3.3–5)
ALP SERPL-CCNC: 150 U/L — HIGH (ref 40–120)
ALT FLD-CCNC: 10 U/L — SIGNIFICANT CHANGE UP (ref 10–45)
ANION GAP SERPL CALC-SCNC: 18 MMOL/L — HIGH (ref 5–17)
AST SERPL-CCNC: 26 U/L — SIGNIFICANT CHANGE UP (ref 10–40)
BILIRUB SERPL-MCNC: 0.9 MG/DL — SIGNIFICANT CHANGE UP (ref 0.2–1.2)
BUN SERPL-MCNC: 24 MG/DL — HIGH (ref 7–23)
CALCIUM SERPL-MCNC: 8.8 MG/DL — SIGNIFICANT CHANGE UP (ref 8.4–10.5)
CHLORIDE SERPL-SCNC: 95 MMOL/L — LOW (ref 96–108)
CO2 SERPL-SCNC: 20 MMOL/L — LOW (ref 22–31)
CREAT SERPL-MCNC: 1.81 MG/DL — HIGH (ref 0.5–1.3)
EGFR: 27 ML/MIN/1.73M2 — LOW
GLUCOSE BLDC GLUCOMTR-MCNC: 181 MG/DL — HIGH (ref 70–99)
GLUCOSE BLDC GLUCOMTR-MCNC: 188 MG/DL — HIGH (ref 70–99)
GLUCOSE BLDC GLUCOMTR-MCNC: 196 MG/DL — HIGH (ref 70–99)
GLUCOSE SERPL-MCNC: 152 MG/DL — HIGH (ref 70–99)
HCT VFR BLD CALC: 37.4 % — SIGNIFICANT CHANGE UP (ref 34.5–45)
HGB BLD-MCNC: 11.8 G/DL — SIGNIFICANT CHANGE UP (ref 11.5–15.5)
MCHC RBC-ENTMCNC: 28.9 PG — SIGNIFICANT CHANGE UP (ref 27–34)
MCHC RBC-ENTMCNC: 31.6 GM/DL — LOW (ref 32–36)
MCV RBC AUTO: 91.4 FL — SIGNIFICANT CHANGE UP (ref 80–100)
NRBC # BLD: 0 /100 WBCS — SIGNIFICANT CHANGE UP (ref 0–0)
PLATELET # BLD AUTO: 372 K/UL — SIGNIFICANT CHANGE UP (ref 150–400)
POTASSIUM SERPL-MCNC: 4 MMOL/L — SIGNIFICANT CHANGE UP (ref 3.5–5.3)
POTASSIUM SERPL-SCNC: 4 MMOL/L — SIGNIFICANT CHANGE UP (ref 3.5–5.3)
PROT SERPL-MCNC: 7.4 G/DL — SIGNIFICANT CHANGE UP (ref 6–8.3)
RBC # BLD: 4.09 M/UL — SIGNIFICANT CHANGE UP (ref 3.8–5.2)
RBC # FLD: 15.5 % — HIGH (ref 10.3–14.5)
SODIUM SERPL-SCNC: 133 MMOL/L — LOW (ref 135–145)
TRIGL SERPL-MCNC: 150 MG/DL — HIGH
TSH SERPL-MCNC: 4.62 UIU/ML — HIGH (ref 0.27–4.2)
WBC # BLD: 10.81 K/UL — HIGH (ref 3.8–10.5)
WBC # FLD AUTO: 10.81 K/UL — HIGH (ref 3.8–10.5)

## 2023-01-19 PROCEDURE — 76775 US EXAM ABDO BACK WALL LIM: CPT | Mod: 26

## 2023-01-19 PROCEDURE — 99223 1ST HOSP IP/OBS HIGH 75: CPT

## 2023-01-19 PROCEDURE — 70450 CT HEAD/BRAIN W/O DYE: CPT | Mod: 26

## 2023-01-19 RX ORDER — LEVOTHYROXINE SODIUM 125 MCG
25 TABLET ORAL AT BEDTIME
Refills: 0 | Status: DISCONTINUED | OUTPATIENT
Start: 2023-01-19 | End: 2023-01-25

## 2023-01-19 RX ORDER — ERTAPENEM SODIUM 1 G/1
500 INJECTION, POWDER, LYOPHILIZED, FOR SOLUTION INTRAMUSCULAR; INTRAVENOUS EVERY 24 HOURS
Refills: 0 | Status: DISCONTINUED | OUTPATIENT
Start: 2023-01-19 | End: 2023-01-19

## 2023-01-19 RX ORDER — SODIUM CHLORIDE 9 MG/ML
1000 INJECTION, SOLUTION INTRAVENOUS
Refills: 0 | Status: DISCONTINUED | OUTPATIENT
Start: 2023-01-19 | End: 2023-01-25

## 2023-01-19 RX ORDER — HEPARIN SODIUM 5000 [USP'U]/ML
5000 INJECTION INTRAVENOUS; SUBCUTANEOUS
Refills: 0 | Status: DISCONTINUED | OUTPATIENT
Start: 2023-01-19 | End: 2023-01-25

## 2023-01-19 RX ORDER — DEXTROSE 50 % IN WATER 50 %
12.5 SYRINGE (ML) INTRAVENOUS ONCE
Refills: 0 | Status: DISCONTINUED | OUTPATIENT
Start: 2023-01-19 | End: 2023-01-25

## 2023-01-19 RX ORDER — SODIUM CHLORIDE 9 MG/ML
1000 INJECTION INTRAMUSCULAR; INTRAVENOUS; SUBCUTANEOUS
Refills: 0 | Status: DISCONTINUED | OUTPATIENT
Start: 2023-01-19 | End: 2023-01-25

## 2023-01-19 RX ORDER — INSULIN LISPRO 100/ML
VIAL (ML) SUBCUTANEOUS EVERY 6 HOURS
Refills: 0 | Status: DISCONTINUED | OUTPATIENT
Start: 2023-01-19 | End: 2023-01-20

## 2023-01-19 RX ORDER — HYDROMORPHONE HYDROCHLORIDE 2 MG/ML
0.5 INJECTION INTRAMUSCULAR; INTRAVENOUS; SUBCUTANEOUS ONCE
Refills: 0 | Status: DISCONTINUED | OUTPATIENT
Start: 2023-01-19 | End: 2023-01-19

## 2023-01-19 RX ORDER — PIPERACILLIN AND TAZOBACTAM 4; .5 G/20ML; G/20ML
3.38 INJECTION, POWDER, LYOPHILIZED, FOR SOLUTION INTRAVENOUS ONCE
Refills: 0 | Status: COMPLETED | OUTPATIENT
Start: 2023-01-19 | End: 2023-01-19

## 2023-01-19 RX ORDER — GLUCAGON INJECTION, SOLUTION 0.5 MG/.1ML
1 INJECTION, SOLUTION SUBCUTANEOUS ONCE
Refills: 0 | Status: DISCONTINUED | OUTPATIENT
Start: 2023-01-19 | End: 2023-01-25

## 2023-01-19 RX ORDER — ASPIRIN/CALCIUM CARB/MAGNESIUM 324 MG
81 TABLET ORAL DAILY
Refills: 0 | Status: DISCONTINUED | OUTPATIENT
Start: 2023-01-19 | End: 2023-01-25

## 2023-01-19 RX ORDER — PIPERACILLIN AND TAZOBACTAM 4; .5 G/20ML; G/20ML
3.38 INJECTION, POWDER, LYOPHILIZED, FOR SOLUTION INTRAVENOUS EVERY 8 HOURS
Refills: 0 | Status: DISCONTINUED | OUTPATIENT
Start: 2023-01-20 | End: 2023-01-25

## 2023-01-19 RX ORDER — DEXTROSE 50 % IN WATER 50 %
25 SYRINGE (ML) INTRAVENOUS ONCE
Refills: 0 | Status: DISCONTINUED | OUTPATIENT
Start: 2023-01-19 | End: 2023-01-25

## 2023-01-19 RX ORDER — TICAGRELOR 90 MG/1
90 TABLET ORAL EVERY 12 HOURS
Refills: 0 | Status: DISCONTINUED | OUTPATIENT
Start: 2023-01-19 | End: 2023-01-20

## 2023-01-19 RX ORDER — DEXTROSE 50 % IN WATER 50 %
15 SYRINGE (ML) INTRAVENOUS ONCE
Refills: 0 | Status: DISCONTINUED | OUTPATIENT
Start: 2023-01-19 | End: 2023-01-25

## 2023-01-19 RX ADMIN — PIPERACILLIN AND TAZOBACTAM 25 GRAM(S): 4; .5 INJECTION, POWDER, LYOPHILIZED, FOR SOLUTION INTRAVENOUS at 21:48

## 2023-01-19 RX ADMIN — PIPERACILLIN AND TAZOBACTAM 200 GRAM(S): 4; .5 INJECTION, POWDER, LYOPHILIZED, FOR SOLUTION INTRAVENOUS at 17:14

## 2023-01-19 RX ADMIN — Medication 81 MILLIGRAM(S): at 12:08

## 2023-01-19 RX ADMIN — TICAGRELOR 90 MILLIGRAM(S): 90 TABLET ORAL at 17:17

## 2023-01-19 RX ADMIN — HYDROMORPHONE HYDROCHLORIDE 0.5 MILLIGRAM(S): 2 INJECTION INTRAMUSCULAR; INTRAVENOUS; SUBCUTANEOUS at 13:19

## 2023-01-19 RX ADMIN — Medication 25 MICROGRAM(S): at 21:58

## 2023-01-19 RX ADMIN — HYDROMORPHONE HYDROCHLORIDE 0.5 MILLIGRAM(S): 2 INJECTION INTRAMUSCULAR; INTRAVENOUS; SUBCUTANEOUS at 14:00

## 2023-01-19 RX ADMIN — Medication 1: at 17:36

## 2023-01-19 RX ADMIN — SODIUM CHLORIDE 70 MILLILITER(S): 9 INJECTION INTRAMUSCULAR; INTRAVENOUS; SUBCUTANEOUS at 03:24

## 2023-01-19 RX ADMIN — Medication 1: at 07:29

## 2023-01-19 RX ADMIN — TICAGRELOR 90 MILLIGRAM(S): 90 TABLET ORAL at 05:42

## 2023-01-19 RX ADMIN — HEPARIN SODIUM 5000 UNIT(S): 5000 INJECTION INTRAVENOUS; SUBCUTANEOUS at 12:07

## 2023-01-19 RX ADMIN — Medication 1: at 12:07

## 2023-01-19 NOTE — H&P ADULT - PROBLEM SELECTOR PLAN 4
See above.   Echo.   On ASA and Brilinta.   Would clarify patient's Jardiance in the AM--to confirm use for CHF.   Would consider formal Cardiology opinion in the AM.

## 2023-01-19 NOTE — H&P ADULT - PROBLEM SELECTOR PLAN 1
See above.   Seen by general surgery.   Will continue with Invanz as above.    Would consider formal ID evaluation in the AM.    NPO x medications.

## 2023-01-19 NOTE — PROGRESS NOTE ADULT - SUBJECTIVE AND OBJECTIVE BOX
SUBJECTIVE:   Seen and examined.     OBJECTIVE: T(C): 36.6 (01-19-23 @ 04:49), Max: 37.4 (01-18-23 @ 17:48)  HR: 91 (01-19-23 @ 04:49) (90 - 99)  BP: 127/75 (01-19-23 @ 04:49) (104/57 - 127/75)  RR: 18 (01-19-23 @ 04:49) (18 - 20)  SpO2: 92% (01-19-23 @ 04:49) (92% - 94%)  Wt(kg): --  I&O's Summary    I&O's Detail    Physical Exam:  General: tired appearing   Pulmonary: normal resp effort  Abdominal: soft, ND, mildly tender on RUQ       MEDICATIONS  (STANDING):  aspirin enteric coated 81 milliGRAM(s) Oral daily  dextrose 5%. 1000 milliLiter(s) (100 mL/Hr) IV Continuous <Continuous>  dextrose 5%. 1000 milliLiter(s) (50 mL/Hr) IV Continuous <Continuous>  dextrose 50% Injectable 25 Gram(s) IV Push once  dextrose 50% Injectable 12.5 Gram(s) IV Push once  dextrose 50% Injectable 25 Gram(s) IV Push once  ertapenem  IVPB 500 milliGRAM(s) IV Intermittent every 24 hours  glucagon  Injectable 1 milliGRAM(s) IntraMuscular once  heparin   Injectable 5000 Unit(s) SubCutaneous <User Schedule>  insulin lispro (ADMELOG) corrective regimen sliding scale   SubCutaneous every 6 hours  levothyroxine Injectable 25 MICROGram(s) IV Push at bedtime  sodium chloride 0.9%. 1000 milliLiter(s) (70 mL/Hr) IV Continuous <Continuous>  ticagrelor 90 milliGRAM(s) Oral every 12 hours    MEDICATIONS  (PRN):  dextrose Oral Gel 15 Gram(s) Oral once PRN Blood Glucose LESS THAN 70 milliGRAM(s)/deciliter      LABS:                        11.8   10.81 )-----------( 372      ( 19 Jan 2023 07:45 )             37.4     01-19    133<L>  |  95<L>  |  24<H>  ----------------------------<  152<H>  4.0   |  20<L>  |  1.81<H>    Ca    8.8      19 Jan 2023 07:45  Phos  4.2     01-18  Mg     2.1     01-18    TPro  7.4  /  Alb  2.9<L>  /  TBili  0.9  /  DBili  x   /  AST  26  /  ALT  10  /  AlkPhos  150<H>  01-19    PT/INR - ( 18 Jan 2023 21:55 )   PT: 14.3 sec;   INR: 1.23 ratio         PTT - ( 18 Jan 2023 21:55 )  PTT:33.7 sec

## 2023-01-19 NOTE — PROGRESS NOTE ADULT - ASSESSMENT
82F PMHx early dementia, CHF, hypothyroidism, anxiety, HTN, HLD, vertigo, DM, and gastritis with recent hospitalization 11/2022 at [facility] for urosepsis (fungal) requiring intubation. 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 Discharged 12/5 and at a rehab facility. Daughter reports pt's functional capacity declined severely since hospitalization. She now uses a wheelchair and has lost weight with poor appetite. Pt's care has been at [facility], but her PCP in rehab Dr. Santos recommended she come to [facility]. Pt has worsening r-sided abd pain since Sunday. Imaging suggests acute cholecystitis.    PLAN  - No acute surgical intervention given pt comorbidities  - Continue IV Abx   - consider IR perc cholecystostomy  - Clarify [facility]       Red Surgery  [id number] 82F PMHx early dementia, CHF, hypothyroidism, anxiety, HTN, HLD, vertigo, DM, and gastritis with recent hospitalization 11/2022 at [facility] for urosepsis (fungal) requiring intubation. 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 Discharged 12/5 and at a rehab facility. Daughter reports pt's functional capacity declined severely since hospitalization. She now uses a wheelchair and has lost weight with poor appetite. Pt's care has been at [facility], but her PCP in rehab Dr. Santos recommended she come to [facility]. Pt has worsening r-sided abd pain since Sunday. Imaging suggests acute cholecystitis.    PLAN  - No acute surgical intervention given pt comorbidities  - Continue IV Abx   - consider IR perc cholecystostomy  - Clarify [facility]       Red Surgery  [id number] 82F PMHx early dementia, CHF, hypothyroidism, anxiety, HTN, HLD, vertigo, DM, and gastritis with recent hospitalization 11/2022 at [facility] for urosepsis (fungal) requiring intubation. 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 Discharged 12/5 and at a rehab facility. Daughter reports pt's functional capacity declined severely since hospitalization. She now uses a wheelchair and has lost weight with poor appetite. Pt's care has been at [facility], but her PCP in rehab Dr. Santos recommended she come to [facility]. Pt has worsening r-sided abd pain since Sunday. Imaging suggests acute cholecystitis.    PLAN  - No acute surgical intervention given pt comorbidities  - Continue IV Abx   - consider IR perc cholecystostomy  - Clarify [facility]       Red Surgery  [id number]

## 2023-01-19 NOTE — CONSULT NOTE ADULT - REASON FOR ADMISSION
Sent from Margaret Tietz [facility] for RUQ abdominal pain since 1/15/23 Sent from Margaret Tietz [facility] for RUQ abdominal pain since 1/15/23 Sent from Margaret Tietz [facility] for RUQ abdominal pain since 1/15/23

## 2023-01-19 NOTE — PATIENT PROFILE ADULT - SAFE PLACE TO LIVE
Negative for shortness of breath. Cardiovascular: Negative for chest pain and palpitations. Musculoskeletal: Negative for myalgias. Neurological: Negative for headaches. other review of systems reviewed and are negative    OBJECTIVE:  /82   Pulse 91   Temp 97 °F (36.1 °C)   Resp 14   Ht 6' 1\" (1.854 m)   Wt 257 lb (116.6 kg)   SpO2 98%   BMI 33.91 kg/m²      Physical Exam  Vitals signs reviewed. Eyes:      General: No scleral icterus. Conjunctiva/sclera: Conjunctivae normal.   Neck:      Musculoskeletal: Neck supple. Thyroid: No thyromegaly. Vascular: No carotid bruit. Cardiovascular:      Rate and Rhythm: Normal rate and regular rhythm. Heart sounds: No murmur. Pulmonary:      Effort: Pulmonary effort is normal.      Breath sounds: Normal breath sounds. No wheezing or rales. Abdominal:      General: Bowel sounds are normal.      Palpations: Abdomen is soft. There is no mass. Tenderness: There is no abdominal tenderness. There is no guarding or rebound. Musculoskeletal: Normal range of motion. Lymphadenopathy:      Cervical: No cervical adenopathy. Skin:     General: Skin is warm and dry. Neurological:      Mental Status: He is alert and oriented to person, place, and time. Cranial Nerves: No cranial nerve deficit. Psychiatric:         Mood and Affect: Mood normal.         ASSESSMENT AND PLAN:    Luan Lowe was seen today for 6 month follow-up, blood work and medication refill. Diagnoses and all orders for this visit:    Screening PSA (prostate specific antigen)  -     Psa screening; Future    Essential hypertension  -     losartan-hydrochlorothiazide (HYZAAR) 100-25 MG per tablet; Take 1 tablet by mouth daily  -     amLODIPine (NORVASC) 5 MG tablet; Take 1 tablet by mouth daily  -     Comprehensive Metabolic Panel; Future    Mixed hyperlipidemia  -     atorvastatin (LIPITOR) 20 MG tablet;  Take 1 tablet by mouth nightly    Facial tic    Other orders  -
no

## 2023-01-19 NOTE — H&P ADULT - REASON FOR ADMISSION
Sent from Margaret Tietz [facility] for RUQ abdominal pain since 1/15/23 Sent from Margaret Tietz [facility] for RUQ abdominal pain since 1/15/23 Sent from Margaret Tietz [facility] for RUQ abdominal pain since 1/15/23

## 2023-01-19 NOTE — H&P ADULT - PROBLEM SELECTOR PLAN 6
See above.    Would consider clarifying history and recent workup from last hospitalization at [facility]. See above.    Would consider clarifying history and recent workup from last hospitalization at [facility]. See above.    Would consider clarifying history and recent workup from last hospitalization at [facility].

## 2023-01-19 NOTE — CONSULT NOTE ADULT - ASSESSMENT
82 f with HTN, CAD, CHF, hospitalization at [facility] 11/2022 for septic shock due to citrobacter bacteremia and R pyelo c/b resp failure, MI and CVA, now p/w RUQ pain and nausea  here afebrile, WBC: 12  abd u/s:  Distended gallbladder and gallbladder wall thickening in the setting of positive sonographic Pond sign compatible with acute cholecystitis. Small right upper quadrant ascites.  abd/pelvis CT:  Gallbladder distention with marked wall thickening and surrounding inflammatory changes/fluid. Probable layering sludge, concerning for acute cholecystitis. Striated appearance of the kidneys, right greater than left likely reflects infection/pyelonephritis.     leukocytosis, RUQ pain due to acute cholecystitis    * surgery stated no surgical interventions in view pt's comorbidities  * IR eval for perc cynthia  * f/u the blood cx  * discontinue ertapenem and start zosyn    The above assessment and plan was discussed with the primary team    Daniella Ribeiro MD  contact on teams  After 5pm and on weekends call [phone]   82 f with HTN, CAD, CHF, hospitalization at [facility] 11/2022 for septic shock due to citrobacter bacteremia and R pyelo c/b resp failure, MI and CVA, now p/w RUQ pain and nausea  here afebrile, WBC: 12  abd u/s:  Distended gallbladder and gallbladder wall thickening in the setting of positive sonographic Pond sign compatible with acute cholecystitis. Small right upper quadrant ascites.  abd/pelvis CT:  Gallbladder distention with marked wall thickening and surrounding inflammatory changes/fluid. Probable layering sludge, concerning for acute cholecystitis. Striated appearance of the kidneys, right greater than left likely reflects infection/pyelonephritis.     leukocytosis, RUQ pain due to acute cholecystitis    * surgery stated no surgical interventions in view pt's comorbidities  * IR eval for perc cynthia  * f/u the blood cx  * discontinue ertapenem and start zosyn    The above assessment and plan was discussed with the primary team    Daniella Ribeiro MD  contact on teams  After 5pm and on weekends call [phone]   82 f with HTN, CAD, CHF, hospitalization at [facility] 11/2022 for septic shock due to citrobacter bacteremia and R pyelo c/b resp failure, MI and CVA, now p/w RUQ pain and nausea  here afebrile, WBC: 12  abd u/s:  Distended gallbladder and gallbladder wall thickening in the setting of positive sonographic Pond sign compatible with acute cholecystitis. Small right upper quadrant ascites.  abd/pelvis CT:  Gallbladder distention with marked wall thickening and surrounding inflammatory changes/fluid. Probable layering sludge, concerning for acute cholecystitis. Striated appearance of the kidneys, right greater than left likely reflects infection/pyelonephritis.     leukocytosis, RUQ pain due to acute cholecystitis    * surgery stated no surgical interventions in view pt's comorbidities  * IR eval for perc cynthia  * f/u the blood cx  * discontinue ertapenem and start zosyn    The above assessment and plan was discussed with the primary team    Daniella Ribeiro MD  contact on teams  After 5pm and on weekends call [phone]

## 2023-01-19 NOTE — PROGRESS NOTE ADULT - REASON FOR ADMISSION
Sent from Margaret Tietz [facility] for RUQ abdominal pain since 1/15/23 Sent from Margaret Tietz [facility] for RUQ abdominal pain since 1/15/23 Sent from Margaret Tietz [facility] for RUQ abdominal pain since 1/15/23

## 2023-01-19 NOTE — CONSULT NOTE ADULT - SUBJECTIVE AND OBJECTIVE BOX
Interventional Radiology  Evaluate for Procedure: Perc Cynthia    HPI: 82F PMHx early dementia, CHF, hypothyroidism, anxiety, HTN, HLD, vertigo, DM, and gastritis with recent hospitalization 11/2022 at [facility] for urosepsis (fungal) requiring intubation. 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 Discharged 12/5 and at a rehab facility. Daughter reports pt's functional capacity declined severely since hospitalization. She now uses a wheelchair and has lost weight with poor appetite. Pt's care has been at [facility], but her PCP in rehab Dr. Santos recommended she come to [facility]. Pt has worsening r-sided abd pain since Sunday. Imaging suggests acute cholecystitis. IR consulted for perc cynthia.     Allergies: NKDA  Medications (Abx/Cardiac/Anticoagulation/Blood Products)  aspirin enteric coated: 81 milliGRAM(s) Oral (01-19 @ 12:08)  ertapenem  IVPB: 100 mL/Hr IV Intermittent (01-18 @ 22:16)  heparin   Injectable: 5000 Unit(s) SubCutaneous (01-19 @ 12:07)  piperacillin/tazobactam IVPB.: 200 mL/Hr IV Intermittent (01-19 @ 17:14)  ticagrelor: 90 milliGRAM(s) Oral (01-19 @ 17:17)    Data:  T(C): 36.7  HR: 96  BP: 132/74  RR: 18  SpO2: 94%    -WBC 10.81 / HgB 11.8 / Hct 37.4 / Plt 372  -Na 133 / Cl 95 / BUN 24 / Glucose 152  -K 4.0 / CO2 20 / Cr 1.81  -ALT 10 / Alk Phos 150 / T.Bili 0.9  -INR 1.23 / PTT 33.7    Radiology: reviewed    Assessment/Plan: 82F PMHx early dementia, CHF, hypothyroidism, anxiety, HTN, HLD, vertigo, DM, and gastritis with recent hospitalization 11/2022 at [facility] for urosepsis (fungal) requiring intubation. 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 Discharged 12/5 and at a rehab facility. Daughter reports pt's functional capacity declined severely since hospitalization. She now uses a wheelchair and has lost weight with poor appetite. Pt's care has been at [facility], but her PCP in rehab Dr. Santos recommended she come to [facility]. Pt has worsening r-sided abd pain since Sunday. Imaging suggests acute cholecystitis. IR consulted for perc cynthia.     - case reviewed  - risk of bleeding outweighs benefit of procedure, recommend conservative/medical management   - if possible, consider changing to heparin gtt (brilinta would need to held x7days/ asa x5days due to high risk procedure)  - if patient decompensates please reconsult for re-evaluation of drainage  - d/w primary team    Keke Morrell NP  Available on Teams  Interventional Radiology  Evaluate for Procedure: Perc Cynthia    HPI: 82F PMHx early dementia, CHF, hypothyroidism, anxiety, HTN, HLD, vertigo, DM, and gastritis with recent hospitalization 11/2022 at [facility] for urosepsis (fungal) requiring intubation. 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 Discharged 12/5 and at a rehab facility. Daughter reports pt's functional capacity declined severely since hospitalization. She now uses a wheelchair and has lost weight with poor appetite. Pt's care has been at [facility], but her PCP in rehab Dr. Santos recommended she come to [facility]. Pt has worsening r-sided abd pain since Sunday. Imaging suggests acute cholecystitis. IR consulted for perc cynthia.     Allergies: NKDA  Medications (Abx/Cardiac/Anticoagulation/Blood Products)  aspirin enteric coated: 81 milliGRAM(s) Oral (01-19 @ 12:08)  ertapenem  IVPB: 100 mL/Hr IV Intermittent (01-18 @ 22:16)  heparin   Injectable: 5000 Unit(s) SubCutaneous (01-19 @ 12:07)  piperacillin/tazobactam IVPB.: 200 mL/Hr IV Intermittent (01-19 @ 17:14)  ticagrelor: 90 milliGRAM(s) Oral (01-19 @ 17:17)    Data:  T(C): 36.7  HR: 96  BP: 132/74  RR: 18  SpO2: 94%    -WBC 10.81 / HgB 11.8 / Hct 37.4 / Plt 372  -Na 133 / Cl 95 / BUN 24 / Glucose 152  -K 4.0 / CO2 20 / Cr 1.81  -ALT 10 / Alk Phos 150 / T.Bili 0.9  -INR 1.23 / PTT 33.7    Radiology: reviewed    Assessment/Plan: 82F PMHx early dementia, CHF, hypothyroidism, anxiety, HTN, HLD, vertigo, DM, and gastritis with recent hospitalization 11/2022 at [facility] for urosepsis (fungal) requiring intubation. 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 Discharged 12/5 and at a rehab facility. Daughter reports pt's functional capacity declined severely since hospitalization. She now uses a wheelchair and has lost weight with poor appetite. Pt's care has been at [facility], but her PCP in rehab Dr. Santos recommended she come to [facility]. Pt has worsening r-sided abd pain since Sunday. Imaging suggests acute cholecystitis. IR consulted for perc cynthia.     - case reviewed  - risk of bleeding outweighs benefit of procedure, recommend conservative/medical management   - if possible, consider changing to heparin gtt (brilinta would need to held x7days/ asa x5days due to high risk procedure)  - if patient decompensates please reconsult for re-evaluation of drainage  - d/w primary team    Keke Morrell NP  Available on Teams  Interventional Radiology  Evaluate for Procedure: Perc Cynthia    HPI: 82F PMHx early dementia, CHF, hypothyroidism, anxiety, HTN, HLD, vertigo, DM, and gastritis with recent hospitalization 11/2022 at [facility] for urosepsis (fungal) requiring intubation. 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 Discharged 12/5 and at a rehab facility. Daughter reports pt's functional capacity declined severely since hospitalization. She now uses a wheelchair and has lost weight with poor appetite. Pt's care has been at [facility], but her PCP in rehab Dr. Santos recommended she come to [facility]. Pt has worsening r-sided abd pain since Sunday. Imaging suggests acute cholecystitis. IR consulted for perc cynthia.     Allergies: NKDA  Medications (Abx/Cardiac/Anticoagulation/Blood Products)  aspirin enteric coated: 81 milliGRAM(s) Oral (01-19 @ 12:08)  ertapenem  IVPB: 100 mL/Hr IV Intermittent (01-18 @ 22:16)  heparin   Injectable: 5000 Unit(s) SubCutaneous (01-19 @ 12:07)  piperacillin/tazobactam IVPB.: 200 mL/Hr IV Intermittent (01-19 @ 17:14)  ticagrelor: 90 milliGRAM(s) Oral (01-19 @ 17:17)    Data:  T(C): 36.7  HR: 96  BP: 132/74  RR: 18  SpO2: 94%    -WBC 10.81 / HgB 11.8 / Hct 37.4 / Plt 372  -Na 133 / Cl 95 / BUN 24 / Glucose 152  -K 4.0 / CO2 20 / Cr 1.81  -ALT 10 / Alk Phos 150 / T.Bili 0.9  -INR 1.23 / PTT 33.7    Radiology: reviewed    Assessment/Plan: 82F PMHx early dementia, CHF, hypothyroidism, anxiety, HTN, HLD, vertigo, DM, and gastritis with recent hospitalization 11/2022 at [facility] for urosepsis (fungal) requiring intubation. 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 Discharged 12/5 and at a rehab facility. Daughter reports pt's functional capacity declined severely since hospitalization. She now uses a wheelchair and has lost weight with poor appetite. Pt's care has been at [facility], but her PCP in rehab Dr. Santos recommended she come to [facility]. Pt has worsening r-sided abd pain since Sunday. Imaging suggests acute cholecystitis. IR consulted for perc cynthia.     - case reviewed  - risk of bleeding outweighs benefit of procedure, recommend conservative/medical management   - if possible, consider changing to heparin gtt (brilinta would need to held x7days/ asa x5days due to high risk procedure)  - if patient decompensates please reconsult for re-evaluation of drainage  - d/w primary team    Keke Morrell NP  Available on Teams

## 2023-01-19 NOTE — H&P ADULT - NSHPADDITIONALINFOADULT_GEN_ALL_CORE
NIGHT HOSPITALIST:     Daughter aware of course and agrees with plan/care as above.   Given patient's comorbidities, patient's long term prognosis is guarded.    Emotional support provided to patient/daughter.   The daughter is not yet ready to discuss advance directives.   Care reviewed with covering NP/PA for endorsement to my physician colleagues in the AM.    Pedro Sanders MD  Available on [employer] Teams until 1/19/23  4753 NIGHT HOSPITALIST:     Daughter aware of course and agrees with plan/care as above.   Given patient's comorbidities, patient's long term prognosis is guarded.    Emotional support provided to patient/daughter.   The daughter is not yet ready to discuss advance directives.   Care reviewed with covering NP/PA for endorsement to my physician colleagues in the AM.    Pedro Sanders MD  Available on [employer] Teams until 1/19/23  4329 NIGHT HOSPITALIST:     Daughter aware of course and agrees with plan/care as above.   Given patient's comorbidities, patient's long term prognosis is guarded.    Emotional support provided to patient/daughter.   The daughter is not yet ready to discuss advance directives.   Care reviewed with covering NP/PA for endorsement to my physician colleagues in the AM.    Pedro Sanders MD  Available on [employer] Teams until 1/19/23  1430 NIGHT HOSPITALIST:     Daughter aware of course and agrees with plan/care as above.   Given patient's comorbidities, patient's long term prognosis is guarded.    Emotional support provided to patient/daughter.   The daughter is not yet ready to discuss advance directives.   Care reviewed with covering NP, Phlylis  for endorsement to my physician colleagues in the AM.    Pedro Sanders MD  Available on [employer] Teams until 1/19/23  4029 NIGHT HOSPITALIST:     Daughter aware of course and agrees with plan/care as above.   Given patient's comorbidities, patient's long term prognosis is guarded.    Emotional support provided to patient/daughter.   The daughter is not yet ready to discuss advance directives.   Care reviewed with covering NP, Phyllis  for endorsement to my physician colleagues in the AM.    Pedro Sanders MD  Available on Microsoft Teams until 1/19/23  4123 NIGHT HOSPITALIST:     Daughter aware of course and agrees with plan/care as above.   Given patient's comorbidities, patient's long term prognosis is guarded.    Emotional support provided to patient/daughter.   The daughter is not yet ready to discuss advance directives.   Care reviewed with covering NP, Phyllis  for endorsement to my physician colleagues in the AM.    Pedro Sanders MD  Available on [employer] Teams until 1/19/23  5418

## 2023-01-19 NOTE — H&P ADULT - NSHPSOCIALHISTORY_GEN_ALL_CORE
Past remote ethanol, negative CAGE.  Former smoker.    Lived alone previously in her apartment but now resident at [facility].    Supportive adult daughter.

## 2023-01-19 NOTE — H&P ADULT - NSHPSOURCEINFOTX_GEN_ALL_CORE
Limited history from patient, who converses in English.  Most of history obtained from patient's daughter, Dorota López, [phone] Limited history from patient, who converses in English.  Most of history obtained from patient's daughter, Dorota López, [phone] Limited history from patient, who converses in English.  Most of history obtained from patient's daughter, Dorota López, [phone]

## 2023-01-19 NOTE — CONSULT NOTE ADULT - REASON FOR ADMISSION
Sent from [facility] for RUQ abdominal pain since 1/15/23 Sent from [facility] for RUQ abdominal pain since 1/15/23 Sent from [facility] for RUQ abdominal pain since 1/15/23

## 2023-01-19 NOTE — H&P ADULT - NSHPOUTPATIENTPROVIDERS_GEN_ALL_CORE
Wale Huffman MD (Pulmonary) [phone] Wale Huffman MD (Pulmonary) [phone] Wale Huffman MD (Pulmonary) [phone]

## 2023-01-19 NOTE — PATIENT PROFILE ADULT - FALL HARM RISK - HARM RISK INTERVENTIONS
Assistance with ambulation/Assistance OOB with selected safe patient handling equipment/Communicate Risk of Fall with Harm to all staff/Reinforce activity limits and safety measures with patient and family/Tailored Fall Risk Interventions/Visual Cue: Yellow wristband and red socks/Bed in lowest position, wheels locked, appropriate side rails in place/Call bell, personal items and telephone in reach/Instruct patient to call for assistance before getting out of bed or chair/Non-slip footwear when patient is out of bed/[place] to call system/Physically safe environment - no spills, clutter or unnecessary equipment/Purposeful Proactive Rounding/Room/bathroom lighting operational, light cord in reach Assistance with ambulation/Assistance OOB with selected safe patient handling equipment/Communicate Risk of Fall with Harm to all staff/Reinforce activity limits and safety measures with patient and family/Tailored Fall Risk Interventions/Visual Cue: Yellow wristband and red socks/Bed in lowest position, wheels locked, appropriate side rails in place/Call bell, personal items and telephone in reach/Instruct patient to call for assistance before getting out of bed or chair/Non-slip footwear when patient is out of bed/[place] to call system/Physically safe environment - no spills, clutter or unnecessary equipment/Purposeful Proactive Rounding/Room/bathroom lighting operational, light cord in reach Assistance with ambulation/Assistance OOB with selected safe patient handling equipment/Communicate Risk of Fall with Harm to all staff/Reinforce activity limits and safety measures with patient and family/Tailored Fall Risk Interventions/Visual Cue: Yellow wristband and red socks/Bed in lowest position, wheels locked, appropriate side rails in place/Call bell, personal items and telephone in reach/Instruct patient to call for assistance before getting out of bed or chair/Non-slip footwear when patient is out of bed/[place] to call system/Physically safe environment - no spills, clutter or unnecessary equipment/Purposeful Proactive Rounding/Room/bathroom lighting operational, light cord in reach

## 2023-01-19 NOTE — H&P ADULT - NSHPLABSRESULTS_GEN_ALL_CORE
EKG tracing interpreted by me with NSR at 97 with nonspecific ST T changes.    Chest radiograph interpreted by me with no infiltrate or effusion.    WBC 12.8   77N    Hgb 11.6    Platelets of 378K    INR 1.23    Random glucose of 192  K+ 3.9  Cr 1.90  (no old Cr available)    AG 18    Alb 3.2    Alk phos 157    AST 40  ALT 12    RVP and COVID-19 PCR>>negative.    Triglycerides 150 EKG tracing interpreted by me with NSR at 97 with nonspecific ST T changes.    Chest radiograph interpreted by me with no infiltrate or effusion.    WBC 12.8   77N    Hgb 11.6    Platelets of 378K    INR 1.23    Random glucose of 192  K+ 3.9  Cr 1.90  (no old Cr available)    AG 18    Alb 3.2    Alk phos 157    AST 40  ALT 12    RVP and COVID-19 PCR>>negative.    Triglycerides 150    CTT abdomen with no contrast with GB distention with marked wall thickening and surrounding inflammatory changes, concerning for acute cholecystitis.  Retained contrast of B/L renal parenchyma with RIGHT >left striated appearance consistent with pyelonephritis, 3 mm RML nodule (this last finding was reviewed with the patient's daughter--daughter also reports patient with prior CTT abdomen study with contrast as an outpatient by a urologist in [place]).    RUQ US with distended GB and sonographic sign [facility] EKG tracing interpreted by me with NSR at 97 with nonspecific ST T changes.    Chest radiograph interpreted by me with no infiltrate or effusion.    WBC 12.8   77N    Hgb 11.6    Platelets of 378K    INR 1.23    Random glucose of 192  K+ 3.9  Cr 1.90  (no old Cr available)    AG 18    Alb 3.2    Alk phos 157    AST 40  ALT 12    RVP and COVID-19 PCR>>negative.    Triglycerides 150    CTT abdomen with no contrast with GB distention with marked wall thickening and surrounding inflammatory changes, concerning for acute cholecystitis.  Retained contrast of B/L renal parenchyma with RIGHT >left striated appearance consistent with pyelonephritis, 3 mm RML nodule (this last finding was reviewed with the patient's daughter--daughter also reports patient with prior CTT abdomen study with contrast as an outpatient by a urologist in [place]).    RUQ US with distended GB and sonographic sign [facility] EKG tracing interpreted by me with NSR at 97 with nonspecific ST T changes.    Chest radiograph interpreted by me with no infiltrate or effusion.    WBC 12.8   77N    Hgb 11.6    Platelets of 378K    INR 1.23    Random glucose of 192  K+ 3.9  Cr 1.90  (no old Cr available)    AG 18    Alb 3.2    Alk phos 157    AST 40  ALT 12    RVP and COVID-19 PCR>>negative.    Triglycerides 150    CTT abdomen with no contrast with GB distention with marked wall thickening and surrounding inflammatory changes, concerning for acute cholecystitis.  Retained contrast of B/L renal parenchyma with RIGHT >left striated appearance consistent with pyelonephritis, 3 mm RML nodule (this last finding was reviewed with the patient's daughter--daughter also reports patient with prior CTT abdomen study with contrast as an outpatient by a urologist in [place]).    RUQ US with distended GB and sonographic sign [facility]

## 2023-01-19 NOTE — H&P ADULT - PROBLEM SELECTOR PLAN 3
See above.   CTT head no contrast ordered.   No appreciable focal deficit but moderate cognitive impairment.   Would consider formal Neurology evaluation in the AM.

## 2023-01-19 NOTE — PROGRESS NOTE ADULT - ASSESSMENT
NIGHT HOSPITALIST:    Referral of patient to the ER from the SNF with RUQ pain with CTT and RUQ US evidence of acute cholecystitis, triglycerides nondiagnostic and suspect passed stone in the setting of patient with a complex medical history of a prolonged hospitalization at [facility] ([facility]) with severe septic shock requiring vasopressors, intubation/extubation at the time, with complicating MI and CVA, and COVID-19 at the time.  Patient previously independent in the summer 2022 but apparently since hospitalization has manifested progressive cognitive impairment, and was due to be seen at the SNF by a neurologist and cardiologist on followup.    Seen by general surgery recommended poor candidate for alb choleugenio  recommended percutaneous cholecystoscopy tube. F/u IR.    Will continue with Invanz with dosing for acute kidney injury, suspect prerenal azotemia (will temporarily HOLD the Norvasc) and will provide gentle IVF.   Urinalysis and urine culture ordered.   Would consider formal ID evaluation in the AM.    NO clear collection on CTT abdomen but limited due to lack of IV contrast due to ELIZABETH.   Will obtain a renal US.   Invanz should cover the potential additional urinary source.    Will obtain a noncontrast CTT head.  Aspiration precautions.   Enhanced supervision.   Would consider formal neurology evaluation in the AM.    Will obtain an echo and would clarify with PCP patient's Jardiance, presumably for history of undifferentiated CHF.    Presently patient is not in decompensated CHF.   Would consider formal cardiology evaluation in the AM.    Will provide FS S/S for now to monitor for excess hyperglycemia and for hypoglycemia.   Nutrition consult.    Would attempt to clarify from prior records at [facility] workup of involuntary weight loss or recent outpatient workup.    Daughter aware of followup of repeat CTT chest upon discharge as outlined with small undifferentiated pulmonary nodules as above.    Daughter agrees to pharmacologic DVT prophylaxis. NIGHT HOSPITALIST:    Referral of patient to the ER from the SNF with RUQ pain with CTT and RUQ US evidence of acute cholecystitis, triglycerides nondiagnostic and suspect passed stone in the setting of patient with a complex medical history of a prolonged hospitalization at [facility] ([facility]) with severe septic shock requiring vasopressors, intubation/extubation at the time, with complicating MI and CVA, and COVID-19 at the time.  Patient previously independent in the summer 2022 but apparently since hospitalization has manifested progressive cognitive impairment, and was due to be seen at the SNF by a neurologist and cardiologist on followup.    Seen by general surgery recommended poor candidate for alb choleugenio  recommended percutaneous cholecystoscopy tube. F/u IR.    Will continue with Invanz with dosing for acute kidney injury, suspect prerenal azotemia (will temporarily HOLD the Norvasc) and will provide gentle IVF.   Urinalysis and urine culture ordered.   Would consider formal ID evaluation in the AM.    NO clear collection on CTT abdomen but limited due to lack of IV contrast due to ELIZABETH.   Will obtain a renal US.   Invanz should cover the potential additional urinary source.    Will obtain a noncontrast CTT head.  Aspiration precautions.   Enhanced supervision.   Would consider formal neurology evaluation in the AM.    Will obtain an echo and would clarify with PCP patient's Jardiance, presumably for history of undifferentiated CHF.    Presently patient is not in decompensated CHF.   Would consider formal cardiology evaluation in the AM.    Will provide FS S/S for now to monitor for excess hyperglycemia and for hypoglycemia.   Nutrition consult.    Would attempt to clarify from prior records at [facility] workup of involuntary weight loss or recent outpatient workup.    Daughter aware of followup of repeat CTT chest upon discharge as outlined with small undifferentiated pulmonary nodules as above.    Daughter agrees to pharmacologic DVT prophylaxis. NIGHT HOSPITALIST:    Referral of patient to the ER from the SNF with RUQ pain with CTT and RUQ US evidence of acute cholecystitis, triglycerides nondiagnostic and suspect passed stone in the setting of patient with a complex medical history of a prolonged hospitalization at [facility] ([facility]) with severe septic shock requiring vasopressors, intubation/extubation at the time, with complicating MI and CVA, and COVID-19 at the time.  Patient previously independent in the summer 2022 but apparently since hospitalization has manifested progressive cognitive impairment, and was due to be seen at the SNF by a neurologist and cardiologist on followup.    Seen by general surgery recommended poor candidate for alb choleugenio  recommended percutaneous cholecystoscopy tube. F/u IR.    Will continue with Invanz with dosing for acute kidney injury, suspect prerenal azotemia (will temporarily HOLD the Norvasc) and will provide gentle IVF.   Urinalysis and urine culture ordered.   Would consider formal ID evaluation in the AM.    NO clear collection on CTT abdomen but limited due to lack of IV contrast due to ELIZABETH.   Will obtain a renal US.   Invanz should cover the potential additional urinary source.    Will obtain a noncontrast CTT head.  Aspiration precautions.   Enhanced supervision.   Would consider formal neurology evaluation in the AM.    Will obtain an echo and would clarify with PCP patient's Jardiance, presumably for history of undifferentiated CHF.    Presently patient is not in decompensated CHF.   Would consider formal cardiology evaluation in the AM.    Will provide FS S/S for now to monitor for excess hyperglycemia and for hypoglycemia.   Nutrition consult.    Would attempt to clarify from prior records at [facility] workup of involuntary weight loss or recent outpatient workup.    Daughter aware of followup of repeat CTT chest upon discharge as outlined with small undifferentiated pulmonary nodules as above.    Daughter agrees to pharmacologic DVT prophylaxis.

## 2023-01-19 NOTE — CONSULT NOTE ADULT - REASON FOR ADMISSION
Sent [facility] for RUQ abdominal pain since 1/15/23 Sent [facility] for RUQ abdominal pain since 1/15/23 Sent [facility] for RUQ abdominal pain since 1/15/23

## 2023-01-19 NOTE — H&P ADULT - ASSESSMENT
NIGHT HOSPITALIST:    NIGHT HOSPITALIST:    Referral of patient to the ER from the SNF with RUQ pain with CTT and RUQ US evidence of acute cholecystitis, triglycerides nondiagnostic and suspect passed stone in the setting of patient with a complex medical history of a prolonged hospitalization at [facility] ([facility]) with severe septic shock requiring vasopressors, intubation/extubation at the time, with complicating MI and CVA, and COVID-19 at the time.  Patient previously independent in the summer 2022 but apparently since hospitalization has manifested progressive cognitive impairment, and was due to be seen at the SNF by a neurologist and cardiologist on followup.    Seen by general surgery as above.    Will continue with Invanz with dosing for acute kidney injury, suspect prerenal azotemia (will temporarily HOLD the Norvasc) and will provide gentle IVF.   Urinalysis and urine culture ordered.   Would consider formal ID evaluation in the AM.    NO clear collection on CTT abdomen but limited due to lack of IV contrast due to ELIZABETH.   Will obtain a renal US.   Invanz should cover the potential additional urinary source.    Will obtain a noncontrast CTT head.  Aspiration precautions.   Enhanced supervision.   Would consider formal neurology evaluation in the AM.    Will obtain an echo and would clarify with PCP patient's Jardiance, presumably for history of undifferentiated CHF.    Presently patient is not in decompensated CHF.   Would consider formal cardiology evaluation in the AM.    Will provide FS S/S for now to monitor for excess hyperglycemia and for hypoglycemia.   Nutrition consult.    Would attempt to clarify from prior records at [facility] workup of involuntary weight loss.    Daughter agrees to pharmacologic DVT prophylaxis.    Daughter aware of followup of repeat CTT chest upon discharge as outlined. NIGHT HOSPITALIST:    Referral of patient to the ER from the SNF with RUQ pain with CTT and RUQ US evidence of acute cholecystitis, triglycerides nondiagnostic and suspect passed stone in the setting of patient with a complex medical history of a prolonged hospitalization at [facility] ([facility]) with severe septic shock requiring vasopressors, intubation/extubation at the time, with complicating MI and CVA, and COVID-19 at the time.  Patient previously independent in the summer 2022 but apparently since hospitalization has manifested progressive cognitive impairment, and was due to be seen at the SNF by a neurologist and cardiologist on followup.    Seen by general surgery as above.    Will continue with Invanz with dosing for acute kidney injury, suspect prerenal azotemia (will temporarily HOLD the Norvasc) and will provide gentle IVF.   Urinalysis and urine culture ordered.   Would consider formal ID evaluation in the AM.    NO clear collection on CTT abdomen but limited due to lack of IV contrast due to ELIZABETH.   Will obtain a renal US.   Invanz should cover the potential additional urinary source.    Will obtain a noncontrast CTT head.  Aspiration precautions.   Enhanced supervision.   Would consider formal neurology evaluation in the AM.    Will obtain an echo and would clarify with PCP patient's Jardiance, presumably for history of undifferentiated CHF.    Presently patient is not in decompensated CHF.   Would consider formal cardiology evaluation in the AM.    Will provide FS S/S for now to monitor for excess hyperglycemia and for hypoglycemia.   Nutrition consult.    Would attempt to clarify from prior records at [facility] workup of involuntary weight loss.    Daughter agrees to pharmacologic DVT prophylaxis.    Daughter aware of followup of repeat CTT chest upon discharge as outlined. NIGHT HOSPITALIST:    Referral of patient to the ER from the SNF with RUQ pain with CTT and RUQ US evidence of acute cholecystitis, triglycerides nondiagnostic and suspect passed stone in the setting of patient with a complex medical history of a prolonged hospitalization at [facility] ([facility]) with severe septic shock requiring vasopressors, intubation/extubation at the time, with complicating MI and CVA, and COVID-19 at the time.  Patient previously independent in the summer 2022 but apparently since hospitalization has manifested progressive cognitive impairment, and was due to be seen at the SNF by a neurologist and cardiologist on followup.    Seen by general surgery as above.    Will continue with Invanz with dosing for acute kidney injury, suspect prerenal azotemia (will temporarily HOLD the Norvasc) and will provide gentle IVF.   Urinalysis and urine culture ordered.   Would consider formal ID evaluation in the AM.    NO clear collection on CTT abdomen but limited due to lack of IV contrast due to ELIZABETH.   Will obtain a renal US.   Invanz should cover the potential additional urinary source.    Will obtain a noncontrast CTT head.  Aspiration precautions.   Enhanced supervision.   Would consider formal neurology evaluation in the AM.    Will obtain an echo and would clarify with PCP patient's Jardiance, presumably for history of undifferentiated CHF.    Presently patient is not in decompensated CHF.   Would consider formal cardiology evaluation in the AM.    Will provide FS S/S for now to monitor for excess hyperglycemia and for hypoglycemia.   Nutrition consult.    Would attempt to clarify from prior records at [facility] workup of involuntary weight loss.    Daughter agrees to pharmacologic DVT prophylaxis.    Daughter aware of followup of repeat CTT chest upon discharge as outlined. NIGHT HOSPITALIST:    Referral of patient to the ER from the SNF with RUQ pain with CTT and RUQ US evidence of acute cholecystitis, triglycerides nondiagnostic and suspect passed stone in the setting of patient with a complex medical history of a prolonged hospitalization at [facility] ([facility]) with severe septic shock requiring vasopressors, intubation/extubation at the time, with complicating MI and CVA, and COVID-19 at the time.  Patient previously independent in the summer 2022 but apparently since hospitalization has manifested progressive cognitive impairment, and was due to be seen at the SNF by a neurologist and cardiologist on followup.    Seen by general surgery as above.    Will continue with Invanz with dosing for acute kidney injury, suspect prerenal azotemia (will temporarily HOLD the Norvasc) and will provide gentle IVF.   Urinalysis and urine culture ordered.   Would consider formal ID evaluation in the AM.    NO clear collection on CTT abdomen but limited due to lack of IV contrast due to ELIZABETH.   Will obtain a renal US.   Invanz should cover the potential additional urinary source.    Will obtain a noncontrast CTT head.  Aspiration precautions.   Enhanced supervision.   Would consider formal neurology evaluation in the AM.    Will obtain an echo and would clarify with PCP patient's Jardiance, presumably for history of undifferentiated CHF.    Presently patient is not in decompensated CHF.   Would consider formal cardiology evaluation in the AM.    Will provide FS S/S for now to monitor for excess hyperglycemia and for hypoglycemia.   Nutrition consult.    Would attempt to clarify from prior records at [facility] workup of involuntary weight loss or recent outpatient workup.    Daughter aware of followup of repeat CTT chest upon discharge as outlined with small undifferentiated pulmonary nodules as above.    Daughter agrees to pharmacologic DVT prophylaxis. NIGHT HOSPITALIST:    Referral of patient to the ER from the SNF with RUQ pain with CTT and RUQ US evidence of acute cholecystitis, triglycerides nondiagnostic and suspect passed stone in the setting of patient with a complex medical history of a prolonged hospitalization at [facility] ([facility]) with severe septic shock requiring vasopressors, intubation/extubation at the time, with complicating MI and CVA, and COVID-19 at the time.  Patient previously independent in the summer 2022 but apparently since hospitalization has manifested progressive cognitive impairment, and was due to be seen at the SNF by a neurologist and cardiologist on followup.    Seen by general surgery as above.    Will continue with Invanz with dosing for acute kidney injury, suspect prerenal azotemia (will temporarily HOLD the Norvasc) and will provide gentle IVF.   Urinalysis and urine culture ordered.   Would consider formal ID evaluation in the AM.    NO clear collection on CTT abdomen but limited due to lack of IV contrast due to ELIZABETH.   Will obtain a renal US.   Invanz should cover the potential additional urinary source.    Will obtain a noncontrast CTT head.  Aspiration precautions.   Enhanced supervision.   Would consider formal neurology evaluation in the AM.    Will obtain an echo and would clarify with PCP patient's Jardiance, presumably for history of undifferentiated CHF.    Presently patient is not in decompensated CHF.   Would consider formal cardiology evaluation in the AM.    Will provide FS S/S for now to monitor for excess hyperglycemia and for hypoglycemia.   Nutrition consult.    Would attempt to clarify from prior records at [facility] workup of involuntary weight loss or recent outpatient workup.    Daughter aware of followup of repeat CTT chest upon discharge as outlined with small undifferentiated pulmonary nodules as above.    Daughter agrees to pharmacologic DVT prophylaxis. NIGHT HOSPITALIST:    Referral of patient to the ER from the SNF with RUQ pain with CTT and RUQ US evidence of acute cholecystitis, triglycerides nondiagnostic and suspect passed stone in the setting of patient with a complex medical history of a prolonged hospitalization at [facility] ([facility]) with severe septic shock requiring vasopressors, intubation/extubation at the time, with complicating MI and CVA, and COVID-19 at the time.  Patient previously independent in the summer 2022 but apparently since hospitalization has manifested progressive cognitive impairment, and was due to be seen at the SNF by a neurologist and cardiologist on followup.    Seen by general surgery as above.    Will continue with Invanz with dosing for acute kidney injury, suspect prerenal azotemia (will temporarily HOLD the Norvasc) and will provide gentle IVF.   Urinalysis and urine culture ordered.   Would consider formal ID evaluation in the AM.    NO clear collection on CTT abdomen but limited due to lack of IV contrast due to ELIZABETH.   Will obtain a renal US.   Invanz should cover the potential additional urinary source.    Will obtain a noncontrast CTT head.  Aspiration precautions.   Enhanced supervision.   Would consider formal neurology evaluation in the AM.    Will obtain an echo and would clarify with PCP patient's Jardiance, presumably for history of undifferentiated CHF.    Presently patient is not in decompensated CHF.   Would consider formal cardiology evaluation in the AM.    Will provide FS S/S for now to monitor for excess hyperglycemia and for hypoglycemia.   Nutrition consult.    Would attempt to clarify from prior records at [facility] workup of involuntary weight loss or recent outpatient workup.    Daughter aware of followup of repeat CTT chest upon discharge as outlined with small undifferentiated pulmonary nodules as above.    Daughter agrees to pharmacologic DVT prophylaxis.

## 2023-01-19 NOTE — PROGRESS NOTE ADULT - REASON FOR ADMISSION
Sent from [facility] for RUQ abdominal pain since 1/15/23 Sent from [facility] for RUQ abdominal pain since 1/15/23 Sent from [facility] for RUQ abdominal pain since 1/15/23

## 2023-01-19 NOTE — H&P ADULT - HISTORY OF PRESENT ILLNESS
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  Daughter reports that the patient was [facility] with patient with impaired functional status following hospitalization, along with development of cognitive impairment.  Unclear if patient had a perinephric abscess at the time with drainage (?).   Patient with a history of essential HTN maintained on Norvasc, hypothyroidism maintained on Synthroid, type 2 DM maintained on  NIGHT HOSPITALIST:   Patient UNKNOWN to me previously, assigned to me at this point via the ER to admit this 83 y/o F--history from patient not reliable and obtained from patient's adult daughter above by me--patient with a history of an apparently complicated course of hospitalization at [facility]  in Nov 2022 with apparently severe septic shock from a complicated pyelonephritis RIGHT with hypotension and respiratory failure S/P intubation /extubation, COVID-19 + at the time (patient COVID-19 vaccinated x 3), with apparent MI at the time and 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  Daughter reports that the patient was discharged [facility] with patient with impaired functional status following hospitalization, along with development of cognitive impairment.  Unclear if patient had a perinephric abscess at the time with drainage (?).   Patient with a history of essential HTN maintained on Norvasc, hypothyroidism maintained on Synthroid, type 2 DM maintained on  NIGHT HOSPITALIST:   Patient UNKNOWN to me previously, assigned to me at this point via the ER to admit this 81 y/o F--history from patient not reliable and obtained from patient's adult daughter above by me--patient with a history of an apparently complicated course of hospitalization at [facility]  in Nov 2022 with apparently severe septic shock from a complicated pyelonephritis RIGHT with hypotension and respiratory failure S/P intubation /extubation, COVID-19 + at the time (patient COVID-19 vaccinated x 3), with apparent MI at the time and 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  Daughter reports that the patient was discharged [facility] with patient with impaired functional status following hospitalization, along with development of cognitive impairment.  Unclear if patient had a perinephric abscess at the time with drainage (?).   Patient with a history of essential HTN maintained on Norvasc, hypothyroidism maintained on Synthroid, type 2 DM maintained on  NIGHT HOSPITALIST:   Patient UNKNOWN to me previously, assigned to me at this point via the ER to admit this 81 y/o F--history from patient not reliable and obtained from patient's adult daughter above by me--patient with a history of an apparently complicated course of hospitalization at [facility]  in Nov 2022 with apparently severe septic shock requiring vasopressors from a complicated pyelonephritis RIGHT with hypotension and respiratory failure S/P intubation /extubation, COVID-19 + at the time (patient COVID-19 vaccinated x 3), with apparent MI at the time and 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  Daughter reports that the patient was discharged [facility] with patient with impaired functional status following hospitalization, along with development of cognitive impairment.  Unclear if patient had a perinephric abscess at the time with drainage (?).   Patient with a history of essential HTN maintained on Norvasc, hypothyroidism maintained on Synthroid, type 2 DM maintained on preprandial insulin, undifferentiated CHF maintained on Jardiance, and CAD maintained on ASA and Brilinta,  NIGHT HOSPITALIST:   Patient UNKNOWN to me previously, assigned to me at this point via the ER to admit this 83 y/o F--history from patient not reliable and obtained from patient's adult daughter above by me--patient with a history of an apparently complicated course of hospitalization at [facility]  in Nov 2022 with apparently severe septic shock requiring vasopressors from a complicated pyelonephritis RIGHT with hypotension and respiratory failure S/P intubation /extubation, COVID-19 + at the time (patient COVID-19 vaccinated x 3), with apparent MI at the time and 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  Daughter reports that the patient was discharged [facility] with patient with impaired functional status following hospitalization, along with development of cognitive impairment.  Unclear if patient had a perinephric abscess at the time with drainage (?).   Patient with a history of essential HTN maintained on Norvasc, hypothyroidism maintained on Synthroid, type 2 DM maintained on preprandial insulin, undifferentiated CHF maintained on Jardiance, and CAD maintained on ASA and Brilinta,  NIGHT HOSPITALIST:   Patient UNKNOWN to me previously, assigned to me at this point via the ER to admit this 81 y/o F--history from patient not reliable and obtained from patient's adult daughter above by me--patient with a history of an apparently complicated course of hospitalization at 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  Daughter reports that the patient was discharged [facility] with patient with impaired functional status following hospitalization, along with development of cognitive impairment.  Unclear if patient had a perinephric abscess at the time with drainage (?).   Patient with a history of essential HTN maintained on Norvasc, hypothyroidism maintained on Synthroid, type 2 DM maintained on preprandial insulin, undifferentiated CHF maintained on Jardiance, and CAD maintained on ASA and Brilinta,  NIGHT HOSPITALIST:   Patient UNKNOWN to me previously, assigned to me at this point via the ER to admit this 81 y/o F--history from patient not reliable and obtained from patient's adult daughter above by me--patient with a history of an apparently complicated course of hospitalization at [facility]  in Nov 2022 with apparently severe septic shock requiring vasopressors from a complicated pyelonephritis RIGHT with hypotension and respiratory failure S/P intubation /extubation, COVID-19 + at the time (patient COVID-19 vaccinated x 3), with apparent MI at the time and 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  Daughter reports that the patient was discharged [facility] with patient with impaired functional status following hospitalization, along with development of cognitive impairment.  Unclear if patient had a perinephric abscess at the time with drainage (?).   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 Wale Huffman at [facility] in Mar 2016 (I reviewed HIE Office notes).    Patient now sent from the SNF following apparently 4 days of  RUQ and RIGHT flank pain with poor PO, nausea.  No chest pain/pressure.  NO fever, no chills, no rigors.  No red blood per rectum or melena.  No diaphoresis. NIGHT HOSPITALIST:   Patient UNKNOWN to me previously, assigned to me at this point via the ER to admit this 81 y/o F--history from patient not reliable and obtained from patient's adult daughter above by me--patient with a history of an apparently complicated course of hospitalization at [facility]  in Nov 2022 with apparently severe septic shock requiring vasopressors from a complicated pyelonephritis RIGHT with hypotension and respiratory failure S/P intubation /extubation, COVID-19 + at the time (patient COVID-19 vaccinated x 3), with apparent MI at the time and 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  Daughter reports that the patient was discharged [facility] with patient with impaired functional status following hospitalization, along with development of cognitive impairment.  Unclear if patient had a perinephric abscess at the time with drainage (?).   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 Wale Huffman at [facility] in Mar 2016 (I reviewed [facility]IE Office notes).    Patient now sent from the SNF following apparently 4 days of  RUQ and RIGHT flank pain with poor PO, nausea.  No chest pain/pressure.  NO fever, no chills, no rigors.  No red blood per rectum or melena.  No diaphoresis. NIGHT HOSPITALIST:   Patient UNKNOWN to me previously, assigned to me at this point via the ER to admit this 81 y/o F--history from patient not reliable and obtained from patient's adult daughter above by me--patient with a history of an apparently complicated course of hospitalization at [facility]  in Nov 2022 with apparently severe septic shock requiring vasopressors from a complicated pyelonephritis RIGHT with hypotension and respiratory failure S/P intubation /extubation, COVID-19 + at the time (patient COVID-19 vaccinated x 3), with apparent MI at the time and 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  Daughter reports that the patient was [facility] with patient with impaired functional status following hospitalization, along with development of cognitive impairment.  Unclear if patient had a perinephric abscess at the time with drainage (?).   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 Wale Huffman at [facility] in Mar 2016 (I reviewed HIE Office notes).    Patient now sent from the SNF following apparently 4 days of  RUQ and RIGHT flank pain with poor PO, nausea.  No chest pain/pressure.  NO fever, no chills, no rigors.  No red blood per rectum or melena.  No diaphoresis.

## 2023-01-19 NOTE — CONSULT NOTE ADULT - ASSESSMENT
83 y/o F--history from patient not reliable and obtained from patient's adult daughter above by me--patient with a history of an apparently complicated course of hospitalization at [facility]  in Nov 2022 with apparently severe septic shock requiring vasopressors from a complicated pyelonephritis RIGHT with hypotension and respiratory failure S/P intubation /extubation, COVID-19 + at the time (patient COVID-19 vaccinated x 3), with apparent MI at the time and 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  Daughter reports that the patient was discharged to [facility] with patient with impaired functional status following hospitalization, along with development of cognitive impairment.  Unclear if patient had a perinephric abscess at the time with drainage (?).   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 Wale Huffman at [facility] in Mar 2016 (I reviewed HIE Office notes).    Patient now sent from the SNF following apparently 4 days of  RUQ and RIGHT flank pain with poor PO, nausea.  No chest pain/pressure.  NO fever, no chills, no rigors.  No red blood per rectum or melena.  No diaphoresis.   83 y/o F--history from patient not reliable and obtained from patient's adult daughter above by me--patient with a history of an apparently complicated course of hospitalization at [facility]  in Nov 2022 with apparently severe septic shock requiring vasopressors from a complicated pyelonephritis RIGHT with hypotension and respiratory failure S/P intubation /extubation, COVID-19 + at the time (patient COVID-19 vaccinated x 3), with apparent MI at the time and 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  Daughter reports that the patient was discharged to [facility] with patient with impaired functional status following hospitalization, along with development of cognitive impairment.  Unclear if patient had a perinephric abscess at the time with drainage (?).   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 Wale Huffman at [facility] in Mar 2016 (I reviewed HIE Office notes).    Patient now sent from the SNF following apparently 4 days of  RUQ and RIGHT flank pain with poor PO, nausea.  No chest pain/pressure.  NO fever, no chills, no rigors.  No red blood per rectum or melena.  No diaphoresis.   83 y/o F--history from patient not reliable and obtained from patient's adult daughter above by me--patient with a history of an apparently complicated course of hospitalization at [facility]  in Nov 2022 with apparently severe septic shock requiring vasopressors from a complicated pyelonephritis RIGHT with hypotension and respiratory failure S/P intubation /extubation, COVID-19 + at the time (patient COVID-19 vaccinated x 3), with apparent MI at the time and 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  Daughter reports that the patient was discharged to [facility] with patient with impaired functional status following hospitalization, along with development of cognitive impairment.  Unclear if patient had a perinephric abscess at the time with drainage (?).   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 Wale Huffman at [facility] in Mar 2016 (I reviewed HIE Office notes).    Patient now sent from the SNF following apparently 4 days of  RUQ and RIGHT flank pain with poor PO, nausea.  No chest pain/pressure.  NO fever, no chills, no rigors.  No red blood per rectum or melena.  No diaphoresis.   83 y/o F--history from patient not reliable and obtained from patient's adult daughter above by me--patient with a history of an apparently complicated course of hospitalization at [facility]  in Nov 2022 with apparently severe septic shock requiring vasopressors from a complicated pyelonephritis RIGHT with hypotension and respiratory failure S/P intubation /extubation, COVID-19 + at the time (patient COVID-19 vaccinated x 3), with apparent MI at the time and 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  Daughter reports that the patient was discharged to [facility] with patient with impaired functional status following hospitalization, along with development of cognitive impairment.  Unclear if patient had a perinephric abscess at the time with drainage (?).   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 Wale Huffman at [facility] in Mar 2016 (I reviewed HIE Office notes).    Patient now sent from the SNF following apparently 4 days of  RUQ and RIGHT flank pain with poor PO, nausea.  No chest pain/pressure.  NO fever, no chills, no rigors.  No red blood per rectum or melena.  No diaphoresis.   pt with hx of htn, ashd, cva, pt had stent in 2020, pt apparently had increase trop and was started empirically on Brilanta?? since november  if pt needs surgery or intervention may hold Brilanta, will discuss with her cardiologist  will adjust cardiac meds  continue asa 81 mg daily  continue abx    81 y/o F--history from patient not reliable and obtained from patient's adult daughter above by me--patient with a history of an apparently complicated course of hospitalization at [facility]  in Nov 2022 with apparently severe septic shock requiring vasopressors from a complicated pyelonephritis RIGHT with hypotension and respiratory failure S/P intubation /extubation, COVID-19 + at the time (patient COVID-19 vaccinated x 3), with apparent MI at the time and 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  Daughter reports that the patient was discharged to [facility] with patient with impaired functional status following hospitalization, along with development of cognitive impairment.  Unclear if patient had a perinephric abscess at the time with drainage (?).   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 Wale Huffman at [facility] in Mar 2016 (I reviewed HIE Office notes).    Patient now sent from the SNF following apparently 4 days of  RUQ and RIGHT flank pain with poor PO, nausea.  No chest pain/pressure.  NO fever, no chills, no rigors.  No red blood per rectum or melena.  No diaphoresis.   pt with hx of htn, ashd, cva, pt had stent in 2020, pt apparently had increase trop and was started empirically on Brilanta?? since november  if pt needs surgery or intervention may hold Brilanta, will discuss with her cardiologist  will adjust cardiac meds  continue asa 81 mg daily  continue abx    83 y/o F--history from patient not reliable and obtained from patient's adult daughter above by me--patient with a history of an apparently complicated course of hospitalization at [facility]  in Nov 2022 with apparently severe septic shock requiring vasopressors from a complicated pyelonephritis RIGHT with hypotension and respiratory failure S/P intubation /extubation, COVID-19 + at the time (patient COVID-19 vaccinated x 3), with apparent MI at the time and 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  Daughter reports that the patient was discharged to [facility] with patient with impaired functional status following hospitalization, along with development of cognitive impairment.  Unclear if patient had a perinephric abscess at the time with drainage (?).   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 Wale Huffman at [facility] in Mar 2016 (I reviewed HIE Office notes).    Patient now sent from the SNF following apparently 4 days of  RUQ and RIGHT flank pain with poor PO, nausea.  No chest pain/pressure.  NO fever, no chills, no rigors.  No red blood per rectum or melena.  No diaphoresis.   pt with hx of htn, ashd, cva, pt had stent in 2020, pt apparently had increase trop and was started empirically on Brilanta?? since november  if pt needs surgery or intervention may hold Brilanta, will discuss with her cardiologist  will adjust cardiac meds  continue asa 81 mg daily  continue abx    81 y/o F--history from patient not reliable and obtained from patient's adult daughter above by me--patient with a history of an apparently complicated course of hospitalization at [facility]  in Nov 2022 with apparently severe septic shock requiring vasopressors from a complicated pyelonephritis RIGHT with hypotension and respiratory failure S/P intubation /extubation, COVID-19 + at the time (patient COVID-19 vaccinated x 3), with apparent MI at the time and 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  Daughter reports that the patient was discharged to [facility] with patient with impaired functional status following hospitalization, along with development of cognitive impairment.  Unclear if patient had a perinephric abscess at the time with drainage (?).   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 Wale Huffman at [facility] in Mar 2016 (I reviewed HIE Office notes).    Patient now sent from the SNF following apparently 4 days of  RUQ and RIGHT flank pain with poor PO, nausea.  No chest pain/pressure.  NO fever, no chills, no rigors.  No red blood per rectum or melena.  No diaphoresis.   pt with hx of htn, ashd, cva, pt had stent in 2020, pt apparently had increase trop and was started empirically on Brilanta?? since november  if pt needs surgery or intervention may hold Brilanta, will discuss with her cardiologist Dr blum   will adjust cardiac meds  continue asa 81 mg daily  continue abx    83 y/o F--history from patient not reliable and obtained from patient's adult daughter above by me--patient with a history of an apparently complicated course of hospitalization at [facility]  in Nov 2022 with apparently severe septic shock requiring vasopressors from a complicated pyelonephritis RIGHT with hypotension and respiratory failure S/P intubation /extubation, COVID-19 + at the time (patient COVID-19 vaccinated x 3), with apparent MI at the time and 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  Daughter reports that the patient was discharged to [facility] with patient with impaired functional status following hospitalization, along with development of cognitive impairment.  Unclear if patient had a perinephric abscess at the time with drainage (?).   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 Wale Huffman at [facility] in Mar 2016 (I reviewed HIE Office notes).    Patient now sent from the SNF following apparently 4 days of  RUQ and RIGHT flank pain with poor PO, nausea.  No chest pain/pressure.  NO fever, no chills, no rigors.  No red blood per rectum or melena.  No diaphoresis.   pt with hx of htn, ashd, cva, pt had stent in 2020, pt apparently had increase trop and was started empirically on Brilanta?? since november  if pt needs surgery or intervention may hold Brilanta, will discuss with her cardiologist Dr blum   will adjust cardiac meds  continue asa 81 mg daily  continue abx    81 y/o F--history from patient not reliable and obtained from patient's adult daughter above by me--patient with a history of an apparently complicated course of hospitalization at [facility]  in Nov 2022 with apparently severe septic shock requiring vasopressors from a complicated pyelonephritis RIGHT with hypotension and respiratory failure S/P intubation /extubation, COVID-19 + at the time (patient COVID-19 vaccinated x 3), with apparent MI at the time and 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  Daughter reports that the patient was discharged to [facility] with patient with impaired functional status following hospitalization, along with development of cognitive impairment.  Unclear if patient had a perinephric abscess at the time with drainage (?).   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 Wale Huffman at [facility] in Mar 2016 (I reviewed HIE Office notes).    Patient now sent from the SNF following apparently 4 days of  RUQ and RIGHT flank pain with poor PO, nausea.  No chest pain/pressure.  NO fever, no chills, no rigors.  No red blood per rectum or melena.  No diaphoresis.   pt with hx of htn, ashd, cva, pt had stent in 2020, pt apparently had increase trop and was started empirically on Brilanta?? since november  if pt needs surgery or intervention may hold Brilanta, will discuss with her cardiologist Dr blum   will adjust cardiac meds  continue asa 81 mg daily  continue abx

## 2023-01-19 NOTE — H&P ADULT - NSICDXPASTMEDICALHX_GEN_ALL_CORE_FT
PAST MEDICAL HISTORY:  2019 novel coronavirus disease (COVID-19)     H/O acute cholecystitis     H/O CHF     H/O pyelonephritis     H/O: HTN (hypertension)     HLD (hyperlipidemia)     Hypothyroidism

## 2023-01-19 NOTE — CONSULT NOTE ADULT - SUBJECTIVE AND OBJECTIVE BOX
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  Daughter reports that the patient was discharged to [facility] with patient with impaired functional status following hospitalization, along with development of cognitive impairment.  Unclear if patient had a perinephric abscess at the time with drainage (?).   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 Wale Huffman at [facility] in Mar 2016 (I reviewed [facility] Office notes).    Patient now sent from the SNF following apparently 4 days of  RUQ and RIGHT flank pain with poor PO, nausea.  No chest pain/pressure.  NO fever, no chills, no rigors.  No red blood per rectum or melena.  No diaphoresis. (2023 01:12)      PAST MEDICAL & SURGICAL HISTORY:  H/O CHF      Hypothyroidism      H/O: HTN (hypertension)      HLD (hyperlipidemia)      2019 novel coronavirus disease (COVID-19)      H/O pyelonephritis      H/O acute cholecystitis      H/O:       Acute cholecystitis          Allergies    No Known Allergies    Intolerances        ANTIMICROBIALS:  ertapenem  IVPB 500 every 24 hours      OTHER MEDS:  aspirin enteric coated 81 milliGRAM(s) Oral daily  dextrose 5%. 1000 milliLiter(s) IV Continuous <Continuous>  dextrose 5%. 1000 milliLiter(s) IV Continuous <Continuous>  dextrose 50% Injectable 25 Gram(s) IV Push once  dextrose 50% Injectable 12.5 Gram(s) IV Push once  dextrose 50% Injectable 25 Gram(s) IV Push once  dextrose Oral Gel 15 Gram(s) Oral once PRN  glucagon  Injectable 1 milliGRAM(s) IntraMuscular once  heparin   Injectable 5000 Unit(s) SubCutaneous <User Schedule>  insulin lispro (ADMELOG) corrective regimen sliding scale   SubCutaneous every 6 hours  levothyroxine Injectable 25 MICROGram(s) IV Push at bedtime  sodium chloride 0.9%. 1000 milliLiter(s) IV Continuous <Continuous>  ticagrelor 90 milliGRAM(s) Oral every 12 hours      SOCIAL HISTORY:  from the [facility], now lives and NH   former smoker, no alcohol or drug abuse  no recent travel    FAMILY HISTORY:  Family history of essential hypertension        ROS:    All other systems negative     Constitutional: no fever, no chills, no weight loss, no night sweats  Eye: no eye pain, no redness, no vision changes  ENT:  no sore throat, no rhinorrhea  Cardiovascular:  no chest pain, no palpitation  Respiratory:  no SOB, no cough  GI:  +R abd pain, +nausea no vomiting, no diarrhea  urinary: no dysuria, no hematuria, no flank pain  : no vaginal discharge or bleeding  musculoskeletal:  no joint pain, no joint swelling  skin:  no rash  neurology:  no headache, no seizure, no change in mental status  psych: no anxiety, no depression     Physical Exam:    General:    NAD, non toxic  Head: atraumatic, normocephalic  Eyes: normal sclera and conjunctiva  ENT:   no oropharyngeal lesions, no LAD, neck supple  Cardio:    regular S1,S2, + murmur  Respiratory:   clear b/l, no wheezing  abd:   soft, BS +, RUQ tenderness  :     no CVAT, no suprapubic tenderness, no scott  Musculoskeletal : no joint swelling, no edema  Skin:    no rash  vascular: no phlebitis  Neurologic:  awake and alert  psych: normal affect      Drug Dosing Weight      Vital Signs Last 24 Hrs  T(F): 98.4 (23 @ 10:57), Max: 99.4 (23 @ 17:48)    Vital Signs Last 24 Hrs  HR: 100 (23 @ 10:57) (90 - 100)  BP: 113/68 (23 @ 10:57) (104/57 - 127/75)  RR: 18 (23 @ 10:57)  SpO2: 95% (23 @ 10:57) (92% - 95%)  Wt(kg): --                          11.8   10.81 )-----------( 372      ( 2023 07:45 )             37.4           133<L>  |  95<L>  |  24<H>  ----------------------------<  152<H>  4.0   |  20<L>  |  1.81<H>    Ca    8.8      2023 07:45  Phos  4.2       Mg     2.1         TPro  7.4  /  Alb  2.9<L>  /  TBili  0.9  /  DBili  x   /  AST  26  /  ALT  10  /  AlkPhos  150<H>            MICROBIOLOGY:  v              RADIOLOGY:    Images independently visualized and reviewed personally,  findings as below    < from: CT Head No Cont (23 @ 03:16) >    IMPRESSION: Age-appropriate involutional and ischemic gliotic changes. No   hemorrhage.      < end of copied text >  < from: CT Abdomen and Pelvis No Cont (23 @ 19:40) >  IMPRESSION:    Limited noncontrast study.    Gallbladder distention with marked wall thickening and surrounding   inflammatory changes/fluid. Probable layering sludge. Findings are   concerning for acute cholecystitis. Correlate with same day ultrasound.    Retained contrast of bilateral renal parenchyma likelyreflects degree of   renal dysfunction/nephropathy, correlate for date of contrast   administration. Striated appearance of the kidneys, right greater than   left likely reflects infection/pyelonephritis.    Severe anterior wedging deformity of L3 vertebral body, favored to be   chronic. Correlate with clinical symptoms.    3 mm right middle lobe nodule can be followed dedicated chest CT in 12   months, or as per patient risk factors.    Additional findings as above. Please read above.    < end of copied text >  < from: US Abdomen Upper Quadrant Right (23 @ 19:02) >  IMPRESSION:  Distended gallbladder and gallbladder wall thickening in the setting of   positive sonographic Pond sign compatible with acute cholecystitis.  Small right upper quadrant ascites.  Small right pleural effusion.    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  Daughter reports that the patient was discharged to [facility] with patient with impaired functional status following hospitalization, along with development of cognitive impairment.  Unclear if patient had a perinephric abscess at the time with drainage (?).   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 Wale Huffman at [facility] in Mar 2016 (I reviewed [facility] Office notes).    Patient now sent from the SNF following apparently 4 days of  RUQ and RIGHT flank pain with poor PO, nausea.  No chest pain/pressure.  NO fever, no chills, no rigors.  No red blood per rectum or melena.  No diaphoresis. (2023 01:12)      PAST MEDICAL & SURGICAL HISTORY:  H/O CHF      Hypothyroidism      H/O: HTN (hypertension)      HLD (hyperlipidemia)      2019 novel coronavirus disease (COVID-19)      H/O pyelonephritis      H/O acute cholecystitis      H/O:       Acute cholecystitis          Allergies    No Known Allergies    Intolerances        ANTIMICROBIALS:  ertapenem  IVPB 500 every 24 hours      OTHER MEDS:  aspirin enteric coated 81 milliGRAM(s) Oral daily  dextrose 5%. 1000 milliLiter(s) IV Continuous <Continuous>  dextrose 5%. 1000 milliLiter(s) IV Continuous <Continuous>  dextrose 50% Injectable 25 Gram(s) IV Push once  dextrose 50% Injectable 12.5 Gram(s) IV Push once  dextrose 50% Injectable 25 Gram(s) IV Push once  dextrose Oral Gel 15 Gram(s) Oral once PRN  glucagon  Injectable 1 milliGRAM(s) IntraMuscular once  heparin   Injectable 5000 Unit(s) SubCutaneous <User Schedule>  insulin lispro (ADMELOG) corrective regimen sliding scale   SubCutaneous every 6 hours  levothyroxine Injectable 25 MICROGram(s) IV Push at bedtime  sodium chloride 0.9%. 1000 milliLiter(s) IV Continuous <Continuous>  ticagrelor 90 milliGRAM(s) Oral every 12 hours      SOCIAL HISTORY:  from the [facility], now lives and NH   former smoker, no alcohol or drug abuse  no recent travel    FAMILY HISTORY:  Family history of essential hypertension        ROS:    All other systems negative     Constitutional: no fever, no chills, no weight loss, no night sweats  Eye: no eye pain, no redness, no vision changes  ENT:  no sore throat, no rhinorrhea  Cardiovascular:  no chest pain, no palpitation  Respiratory:  no SOB, no cough  GI:  +R abd pain, +nausea no vomiting, no diarrhea  urinary: no dysuria, no hematuria, no flank pain  : no vaginal discharge or bleeding  musculoskeletal:  no joint pain, no joint swelling  skin:  no rash  neurology:  no headache, no seizure, no change in mental status  psych: no anxiety, no depression     Physical Exam:    General:    NAD, non toxic  Head: atraumatic, normocephalic  Eyes: normal sclera and conjunctiva  ENT:   no oropharyngeal lesions, no LAD, neck supple  Cardio:    regular S1,S2, + murmur  Respiratory:   clear b/l, no wheezing  abd:   soft, BS +, RUQ tenderness  :     no CVAT, no suprapubic tenderness, no scott  Musculoskeletal : no joint swelling, no edema  Skin:    no rash  vascular: no phlebitis  Neurologic:  awake and alert  psych: normal affect      Drug Dosing Weight      Vital Signs Last 24 Hrs  T(F): 98.4 (23 @ 10:57), Max: 99.4 (23 @ 17:48)    Vital Signs Last 24 Hrs  HR: 100 (23 @ 10:57) (90 - 100)  BP: 113/68 (23 @ 10:57) (104/57 - 127/75)  RR: 18 (23 @ 10:57)  SpO2: 95% (23 @ 10:57) (92% - 95%)  Wt(kg): --                          11.8   10.81 )-----------( 372      ( 2023 07:45 )             37.4           133<L>  |  95<L>  |  24<H>  ----------------------------<  152<H>  4.0   |  20<L>  |  1.81<H>    Ca    8.8      2023 07:45  Phos  4.2       Mg     2.1         TPro  7.4  /  Alb  2.9<L>  /  TBili  0.9  /  DBili  x   /  AST  26  /  ALT  10  /  AlkPhos  150<H>            MICROBIOLOGY:  v              RADIOLOGY:    Images independently visualized and reviewed personally,  findings as below    < from: CT Head No Cont (23 @ 03:16) >    IMPRESSION: Age-appropriate involutional and ischemic gliotic changes. No   hemorrhage.      < end of copied text >  < from: CT Abdomen and Pelvis No Cont (23 @ 19:40) >  IMPRESSION:    Limited noncontrast study.    Gallbladder distention with marked wall thickening and surrounding   inflammatory changes/fluid. Probable layering sludge. Findings are   concerning for acute cholecystitis. Correlate with same day ultrasound.    Retained contrast of bilateral renal parenchyma likelyreflects degree of   renal dysfunction/nephropathy, correlate for date of contrast   administration. Striated appearance of the kidneys, right greater than   left likely reflects infection/pyelonephritis.    Severe anterior wedging deformity of L3 vertebral body, favored to be   chronic. Correlate with clinical symptoms.    3 mm right middle lobe nodule can be followed dedicated chest CT in 12   months, or as per patient risk factors.    Additional findings as above. Please read above.    < end of copied text >  < from: US Abdomen Upper Quadrant Right (23 @ 19:02) >  IMPRESSION:  Distended gallbladder and gallbladder wall thickening in the setting of   positive sonographic Pond sign compatible with acute cholecystitis.  Small right upper quadrant ascites.  Small right pleural effusion.    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  Daughter reports that the patient was discharged to [facility] with patient with impaired functional status following hospitalization, along with development of cognitive impairment.  Unclear if patient had a perinephric abscess at the time with drainage (?).   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 Wale Huffman at [facility] in Mar 2016 (I reviewed [facility] Office notes).    Patient now sent from the SNF following apparently 4 days of  RUQ and RIGHT flank pain with poor PO, nausea.  No chest pain/pressure.  NO fever, no chills, no rigors.  No red blood per rectum or melena.  No diaphoresis. (2023 01:12)      PAST MEDICAL & SURGICAL HISTORY:  H/O CHF      Hypothyroidism      H/O: HTN (hypertension)      HLD (hyperlipidemia)      2019 novel coronavirus disease (COVID-19)      H/O pyelonephritis      H/O acute cholecystitis      H/O:       Acute cholecystitis          Allergies    No Known Allergies    Intolerances        ANTIMICROBIALS:  ertapenem  IVPB 500 every 24 hours      OTHER MEDS:  aspirin enteric coated 81 milliGRAM(s) Oral daily  dextrose 5%. 1000 milliLiter(s) IV Continuous <Continuous>  dextrose 5%. 1000 milliLiter(s) IV Continuous <Continuous>  dextrose 50% Injectable 25 Gram(s) IV Push once  dextrose 50% Injectable 12.5 Gram(s) IV Push once  dextrose 50% Injectable 25 Gram(s) IV Push once  dextrose Oral Gel 15 Gram(s) Oral once PRN  glucagon  Injectable 1 milliGRAM(s) IntraMuscular once  heparin   Injectable 5000 Unit(s) SubCutaneous <User Schedule>  insulin lispro (ADMELOG) corrective regimen sliding scale   SubCutaneous every 6 hours  levothyroxine Injectable 25 MICROGram(s) IV Push at bedtime  sodium chloride 0.9%. 1000 milliLiter(s) IV Continuous <Continuous>  ticagrelor 90 milliGRAM(s) Oral every 12 hours      SOCIAL HISTORY:  from the [facility], now lives and NH   former smoker, no alcohol or drug abuse  no recent travel    FAMILY HISTORY:  Family history of essential hypertension        ROS:    All other systems negative     Constitutional: no fever, no chills, no weight loss, no night sweats  Eye: no eye pain, no redness, no vision changes  ENT:  no sore throat, no rhinorrhea  Cardiovascular:  no chest pain, no palpitation  Respiratory:  no SOB, no cough  GI:  +R abd pain, +nausea no vomiting, no diarrhea  urinary: no dysuria, no hematuria, no flank pain  : no vaginal discharge or bleeding  musculoskeletal:  no joint pain, no joint swelling  skin:  no rash  neurology:  no headache, no seizure, no change in mental status  psych: no anxiety, no depression     Physical Exam:    General:    NAD, non toxic  Head: atraumatic, normocephalic  Eyes: normal sclera and conjunctiva  ENT:   no oropharyngeal lesions, no LAD, neck supple  Cardio:    regular S1,S2, + murmur  Respiratory:   clear b/l, no wheezing  abd:   soft, BS +, RUQ tenderness  :     no CVAT, no suprapubic tenderness, no scott  Musculoskeletal : no joint swelling, no edema  Skin:    no rash  vascular: no phlebitis  Neurologic:  awake and alert  psych: normal affect      Drug Dosing Weight      Vital Signs Last 24 Hrs  T(F): 98.4 (23 @ 10:57), Max: 99.4 (23 @ 17:48)    Vital Signs Last 24 Hrs  HR: 100 (23 @ 10:57) (90 - 100)  BP: 113/68 (23 @ 10:57) (104/57 - 127/75)  RR: 18 (23 @ 10:57)  SpO2: 95% (23 @ 10:57) (92% - 95%)  Wt(kg): --                          11.8   10.81 )-----------( 372      ( 2023 07:45 )             37.4           133<L>  |  95<L>  |  24<H>  ----------------------------<  152<H>  4.0   |  20<L>  |  1.81<H>    Ca    8.8      2023 07:45  Phos  4.2       Mg     2.1         TPro  7.4  /  Alb  2.9<L>  /  TBili  0.9  /  DBili  x   /  AST  26  /  ALT  10  /  AlkPhos  150<H>            MICROBIOLOGY:  v              RADIOLOGY:    Images independently visualized and reviewed personally,  findings as below    < from: CT Head No Cont (23 @ 03:16) >    IMPRESSION: Age-appropriate involutional and ischemic gliotic changes. No   hemorrhage.      < end of copied text >  < from: CT Abdomen and Pelvis No Cont (23 @ 19:40) >  IMPRESSION:    Limited noncontrast study.    Gallbladder distention with marked wall thickening and surrounding   inflammatory changes/fluid. Probable layering sludge. Findings are   concerning for acute cholecystitis. Correlate with same day ultrasound.    Retained contrast of bilateral renal parenchyma likelyreflects degree of   renal dysfunction/nephropathy, correlate for date of contrast   administration. Striated appearance of the kidneys, right greater than   left likely reflects infection/pyelonephritis.    Severe anterior wedging deformity of L3 vertebral body, favored to be   chronic. Correlate with clinical symptoms.    3 mm right middle lobe nodule can be followed dedicated chest CT in 12   months, or as per patient risk factors.    Additional findings as above. Please read above.    < end of copied text >  < from: US Abdomen Upper Quadrant Right (23 @ 19:02) >  IMPRESSION:  Distended gallbladder and gallbladder wall thickening in the setting of   positive sonographic Pond sign compatible with acute cholecystitis.  Small right upper quadrant ascites.  Small right pleural effusion.    < end of copied text >

## 2023-01-19 NOTE — PROGRESS NOTE ADULT - SUBJECTIVE AND OBJECTIVE BOX
Patient is a 82y old  Female who presents with a chief complaint of Sent from [facility] for RUQ abdominal pain since 1/15/23 (19 Jan 2023 01:12)      INTERVAL HPI/OVERNIGHT EVENTS: Patient is hemodynamically stable. Afebrile with RUQ pain and tenderness. USG and CT + for acute cholecystitis . Waiting for IR consult for  percutaneous cholecystostomy placement. According to surgeon  patient is poor candidate for lab choly at this time rather to do due percutaneous cholecystostomy tube. I spoke with daughter in details. Continue IV Invanz.     Pain Location & Control: OK    MEDICATIONS  (STANDING):  aspirin enteric coated 81 milliGRAM(s) Oral daily  dextrose 5%. 1000 milliLiter(s) (100 mL/Hr) IV Continuous <Continuous>  dextrose 5%. 1000 milliLiter(s) (50 mL/Hr) IV Continuous <Continuous>  dextrose 50% Injectable 25 Gram(s) IV Push once  dextrose 50% Injectable 12.5 Gram(s) IV Push once  dextrose 50% Injectable 25 Gram(s) IV Push once  ertapenem  IVPB 500 milliGRAM(s) IV Intermittent every 24 hours  glucagon  Injectable 1 milliGRAM(s) IntraMuscular once  heparin   Injectable 5000 Unit(s) SubCutaneous <User Schedule>  insulin lispro (ADMELOG) corrective regimen sliding scale   SubCutaneous every 6 hours  levothyroxine Injectable 25 MICROGram(s) IV Push at bedtime  sodium chloride 0.9%. 1000 milliLiter(s) (70 mL/Hr) IV Continuous <Continuous>  ticagrelor 90 milliGRAM(s) Oral every 12 hours    MEDICATIONS  (PRN):  dextrose Oral Gel 15 Gram(s) Oral once PRN Blood Glucose LESS THAN 70 milliGRAM(s)/deciliter      Allergies    No Known Allergies    Intolerances        REVIEW OF SYSTEMS:  CONSTITUTIONAL: No fever, weight loss, or fatigue  EYES: No eye pain, visual disturbances, or discharge  ENMT:  No difficulty hearing, tinnitus, vertigo; No sinus or throat pain  NECK: No pain or stiffness  BREASTS: No pain, masses, or nipple discharge  RESPIRATORY: No cough, wheezing, chills or hemoptysis; No shortness of breath  CARDIOVASCULAR: No chest pain, palpitations, dizziness, or leg swelling  GASTROINTESTINAL: No abdominal or epigastric pain. No nausea, vomiting, or hematemesis; No diarrhea or constipation. No melena or hematochezia.  GENITOURINARY: No dysuria, frequency, hematuria, or incontinence  NEUROLOGICAL: No headaches, memory loss, loss of strength, numbness, or tremors  SKIN: No itching, burning, rashes, or lesions   LYMPH NODES: No enlarged glands  ENDOCRINE: No heat or cold intolerance; No hair loss; No polydipsia or polyuria  MUSCULOSKELETAL: No back pain  PSYCHIATRIC: No depression, anxiety, mood swings, or difficulty sleeping  HEME/LYMPH: No easy bruising, or bleeding gums  ALLERGY AND IMMUNOLOGIC: No hives or eczema    Vital Signs Last 24 Hrs  T(C): 36.6 (19 Jan 2023 04:49), Max: 37.4 (18 Jan 2023 17:48)  T(F): 97.9 (19 Jan 2023 04:49), Max: 99.4 (18 Jan 2023 17:48)  HR: 91 (19 Jan 2023 04:49) (90 - 99)  BP: 127/75 (19 Jan 2023 04:49) (104/57 - 127/75)  BP(mean): 73 (19 Jan 2023 01:29) (73 - 73)  RR: 18 (19 Jan 2023 04:49) (18 - 20)  SpO2: 92% (19 Jan 2023 04:49) (92% - 94%)    Parameters below as of 19 Jan 2023 04:49  Patient On (Oxygen Delivery Method): room air        PHYSICAL EXAM:  GENERAL: NAD, well-groomed, well-developed  HEAD:  Atraumatic, Normocephalic  EYES: EOMI, PERRLA, conjunctiva and sclera clear  ENMT: No tonsillar erythema, exudates, or enlargement; Moist mucous membranes, Good dentition, No lesions  NECK: Supple, No JVD, Normal thyroid  NERVOUS SYSTEM:  Alert & Oriented X3, Good concentration; Motor Strength 5/5 B/L upper and lower extremities; DTRs 2+ intact and symmetric  CHEST/LUNG: Clear to auscultation bilaterally; No rales, rhonchi, wheezing, or rubs  HEART: Regular rate and rhythm; No murmurs, rubs, or gallops  ABDOMEN: RUQ tenderness   EXTREMITIES:  2+ Peripheral Pulses, No clubbing or cyanosis  LYMPH: No lymphadenopathy noted  SKIN: No rashes or lesions      LABS:                        11.8   10.81 )-----------( 372      ( 19 Jan 2023 07:45 )             37.4     19 Jan 2023 07:45    133    |  95     |  24     ----------------------------<  152    4.0     |  20     |  1.81     Ca    8.8        19 Jan 2023 07:45  Phos  4.2       18 Jan 2023 18:33  Mg     2.1       18 Jan 2023 18:33    TPro  7.4    /  Alb  2.9    /  TBili  0.9    /  DBili  x      /  AST  26     /  ALT  10     /  AlkPhos  150    19 Jan 2023 07:45    PT/INR - ( 18 Jan 2023 21:55 )   PT: 14.3 sec;   INR: 1.23 ratio         PTT - ( 18 Jan 2023 21:55 )  PTT:33.7 sec    CAPILLARY BLOOD GLUCOSE      POCT Blood Glucose.: 181 mg/dL (19 Jan 2023 06:52)  POCT Blood Glucose.: 181 mg/dL (18 Jan 2023 22:21)        Cultures      RADIOLOGY & ADDITIONAL TESTS:    Imaging Personally Reviewed:  [X ] YES  [ ] NO    Consultant(s) Notes Reviewed:  [ X] YES  [ ] NO    Care Discussed with Consultants/Other Providers [X ] YES  [ ] NO Patient is a 82y old  Female who presents with a chief complaint of Sent from [facility] for RUQ abdominal pain since 1/15/23 (19 Jan 2023 01:12)      INTERVAL HPI/OVERNIGHT EVENTS: Patient is hemodynamically stable. Afebrile with RUQ pain and tenderness. USG and CT + for acute cholecystitis . Waiting for IR consult for  percutaneous cholecystostomy placement. According to surgeon  patient is poor candidate for lab choly at this time rather to do due percutaneous cholecystostomy tube. Continue IV Invanz.  Discussed with daughter at bedside.     Pain Location & Control: OK    MEDICATIONS  (STANDING):  aspirin enteric coated 81 milliGRAM(s) Oral daily  dextrose 5%. 1000 milliLiter(s) (100 mL/Hr) IV Continuous <Continuous>  dextrose 5%. 1000 milliLiter(s) (50 mL/Hr) IV Continuous <Continuous>  dextrose 50% Injectable 25 Gram(s) IV Push once  dextrose 50% Injectable 12.5 Gram(s) IV Push once  dextrose 50% Injectable 25 Gram(s) IV Push once  ertapenem  IVPB 500 milliGRAM(s) IV Intermittent every 24 hours  glucagon  Injectable 1 milliGRAM(s) IntraMuscular once  heparin   Injectable 5000 Unit(s) SubCutaneous <User Schedule>  insulin lispro (ADMELOG) corrective regimen sliding scale   SubCutaneous every 6 hours  levothyroxine Injectable 25 MICROGram(s) IV Push at bedtime  sodium chloride 0.9%. 1000 milliLiter(s) (70 mL/Hr) IV Continuous <Continuous>  ticagrelor 90 milliGRAM(s) Oral every 12 hours    MEDICATIONS  (PRN):  dextrose Oral Gel 15 Gram(s) Oral once PRN Blood Glucose LESS THAN 70 milliGRAM(s)/deciliter      Allergies    No Known Allergies    Intolerances        REVIEW OF SYSTEMS:  CONSTITUTIONAL: No fever, weight loss, or fatigue  EYES: No eye pain, visual disturbances, or discharge  ENMT:  No difficulty hearing, tinnitus, vertigo; No sinus or throat pain  NECK: No pain or stiffness  BREASTS: No pain, masses, or nipple discharge  RESPIRATORY: No cough, wheezing, chills or hemoptysis; No shortness of breath  CARDIOVASCULAR: No chest pain, palpitations, dizziness, or leg swelling  GASTROINTESTINAL: No abdominal or epigastric pain. No nausea, vomiting, or hematemesis; No diarrhea or constipation. No melena or hematochezia.  GENITOURINARY: No dysuria, frequency, hematuria, or incontinence  NEUROLOGICAL: No headaches, memory loss, loss of strength, numbness, or tremors  SKIN: No itching, burning, rashes, or lesions   LYMPH NODES: No enlarged glands  ENDOCRINE: No heat or cold intolerance; No hair loss; No polydipsia or polyuria  MUSCULOSKELETAL: No back pain  PSYCHIATRIC: No depression, anxiety, mood swings, or difficulty sleeping  HEME/LYMPH: No easy bruising, or bleeding gums  ALLERGY AND IMMUNOLOGIC: No hives or eczema    Vital Signs Last 24 Hrs  T(C): 36.6 (19 Jan 2023 04:49), Max: 37.4 (18 Jan 2023 17:48)  T(F): 97.9 (19 Jan 2023 04:49), Max: 99.4 (18 Jan 2023 17:48)  HR: 91 (19 Jan 2023 04:49) (90 - 99)  BP: 127/75 (19 Jan 2023 04:49) (104/57 - 127/75)  BP(mean): 73 (19 Jan 2023 01:29) (73 - 73)  RR: 18 (19 Jan 2023 04:49) (18 - 20)  SpO2: 92% (19 Jan 2023 04:49) (92% - 94%)    Parameters below as of 19 Jan 2023 04:49  Patient On (Oxygen Delivery Method): room air        PHYSICAL EXAM:  GENERAL: NAD, well-groomed, well-developed  HEAD:  Atraumatic, Normocephalic  EYES: EOMI, PERRLA, conjunctiva and sclera clear  ENMT: No tonsillar erythema, exudates, or enlargement; Moist mucous membranes, Good dentition, No lesions  NECK: Supple, No JVD, Normal thyroid  NERVOUS SYSTEM:  Alert & Oriented X3, Good concentration; Motor Strength 5/5 B/L upper and lower extremities; DTRs 2+ intact and symmetric  CHEST/LUNG: Clear to auscultation bilaterally; No rales, rhonchi, wheezing, or rubs  HEART: Regular rate and rhythm; No murmurs, rubs, or gallops  ABDOMEN: RUQ tenderness   EXTREMITIES:  2+ Peripheral Pulses, No clubbing or cyanosis  LYMPH: No lymphadenopathy noted  SKIN: No rashes or lesions      LABS:                        11.8   10.81 )-----------( 372      ( 19 Jan 2023 07:45 )             37.4     19 Jan 2023 07:45    133    |  95     |  24     ----------------------------<  152    4.0     |  20     |  1.81     Ca    8.8        19 Jan 2023 07:45  Phos  4.2       18 Jan 2023 18:33  Mg     2.1       18 Jan 2023 18:33    TPro  7.4    /  Alb  2.9    /  TBili  0.9    /  DBili  x      /  AST  26     /  ALT  10     /  AlkPhos  150    19 Jan 2023 07:45    PT/INR - ( 18 Jan 2023 21:55 )   PT: 14.3 sec;   INR: 1.23 ratio         PTT - ( 18 Jan 2023 21:55 )  PTT:33.7 sec    CAPILLARY BLOOD GLUCOSE      POCT Blood Glucose.: 181 mg/dL (19 Jan 2023 06:52)  POCT Blood Glucose.: 181 mg/dL (18 Jan 2023 22:21)        Cultures      RADIOLOGY & ADDITIONAL TESTS:    Imaging Personally Reviewed:  [X ] YES  [ ] NO    Consultant(s) Notes Reviewed:  [ X] YES  [ ] NO    Care Discussed with Consultants/Other Providers [X ] YES  [ ] NO

## 2023-01-19 NOTE — PROGRESS NOTE ADULT - PROBLEM SELECTOR PLAN 6
See above.    Would consider clarifying history and recent workup from last hospitalization at [facility]. See above.    Would consider clarifying history and recent workup from last hospitalization at [facility]. See above.    Would consider clarifying history and recent workup from last hospitalization at [facility].

## 2023-01-19 NOTE — PROGRESS NOTE ADULT - ATTENDING COMMENTS
date of service 1/19/23    pt seen and examined  pt chart and imaging reviewed in detail  agree with note above  82F PMHx early dementia, CHF, hypothyroidism, anxiety, HTN, HLD, vertigo, DM, and gastritis with recent hospitalization 11/2022 at [facility] for urosepsis (fungal) requiring intubation. 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 Discharged 12/5 and at a rehab facility. Daughter reports pt's functional capacity declined severely since hospitalization. She now uses a wheelchair and has lost weight with poor appetite. Pt's care has been at [facility], but her PCP in rehab Dr. Santos recommended she come to [facility]. Pt has worsening r-sided abd pain since Sunday. Imaging suggests acute cholecystitis.   pt is poor operative candidate at this time.  resting in bed, NAD  anicteric  Soft, + TTP RUQ no r/g  cont npo, ivf, iv abx  serial abd exams  f/u labs in am  cont supportive care per med/cv  consider IR consult for caryn recinose  will follow with you  d/w pt & family @ bedside in detail. date of service 1/19/23    pt seen and examined  pt chart and imaging reviewed in detail  agree with note above  82F PMHx early dementia, CHF, hypothyroidism, anxiety, HTN, HLD, vertigo, DM, and gastritis with recent hospitalization 11/2022 at [facility] for urosepsis (fungal) requiring intubation. 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 Discharged 12/5 and at a rehab facility. Daughter reports pt's functional capacity declined severely since hospitalization. She now uses a wheelchair and has lost weight with poor appetite. Pt's care has been at [facility], but her PCP in rehab Dr. Santos recommended she come to [facility]. Pt has worsening r-sided abd pain since Sunday. Imaging suggests acute cholecystitis.   pt is poor operative candidate at this time.  resting in bed, NAD  anicteric  Soft, + TTP RUQ no r/g  cont npo, ivf, iv abx  serial abd exams  f/u labs in am  cont supportive care per med/cv  consider IR consult for caryn recinose  will follow with you  d/w pt & family @ bedside in detail. date of service 1/19/23    pt seen and examined  pt chart and imaging reviewed in detail  agree with note above  82F PMHx early dementia, CHF, hypothyroidism, anxiety, HTN, HLD, vertigo, DM, and gastritis with recent hospitalization 11/2022 at [facility] for urosepsis (fungal) requiring intubation. 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 Discharged 12/5 and at a rehab facility. Daughter reports pt's functional capacity declined severely since hospitalization. She now uses a wheelchair and has lost weight with poor appetite. Pt's care has been at [facility], but her PCP in rehab Dr. Santos recommended she come to [facility]. Pt has worsening r-sided abd pain since Sunday. Imaging suggests acute cholecystitis.   pt is poor operative candidate at this time.  resting in bed, NAD  anicteric  Soft, + TTP RUQ no r/g  cont npo, ivf, iv abx  serial abd exams  f/u labs in am  cont supportive care per med/cv  consider IR consult for caryn recinose  will follow with you  d/w pt & family @ bedside in detail.

## 2023-01-19 NOTE — H&P ADULT - PROBLEM SELECTOR PLAN 7
Reviewed with daughter, subcentimeter nodules will need outpatient followup as outpatient--previously followed by Dr. Wale Huffman.

## 2023-01-19 NOTE — H&P ADULT - NSHPREVIEWOFSYSTEMS_GEN_ALL_CORE
Unable to obtain ROS from patient due to cognitive impairment and obtained from patient's daughter.    NO HA, no new focal weakness.  NO chest pain/pressure.  No palpitations.  NO cough, no dyspnoea.  No breast symptoms.  NO tearing back pain.    NO thyroid symptoms.  NO SI/HI>  No dysuria,  NO node swelling.  NO joint swelling.    Patient has received the COVID-19 jabs x 3. Unable to obtain ROS from patient due to cognitive impairment and obtained from patient's daughter.    NO HA, no new focal weakness.  NO chest pain/pressure.  No palpitations.  NO cough, no dyspnoea.  No breast symptoms.  NO tearing back pain.    NO thyroid symptoms.  NO SI/HI>  No dysuria,  NO node swelling.  NO joint swelling.  + weight loss    Patient has received the COVID-19 jabs x 3.

## 2023-01-19 NOTE — CONSULT NOTE ADULT - SUBJECTIVE AND OBJECTIVE BOX
CHIEF COMPLAINT:Patient is a 82y old  Female who presents with a chief complaint of Sent from [facility] for RUQ abdominal pain since 1/15/23 (2023 10:35)      HPI:   81 y/o F--history from patient not reliable and obtained from patient's adult daughter above by me--patient with a history of an apparently complicated course of hospitalization at [facility]  in 2022 with apparently severe septic shock requiring vasopressors from a complicated pyelonephritis RIGHT with hypotension and respiratory failure S/P intubation /extubation, COVID-19 + at the time (patient COVID-19 vaccinated x 3), with apparent MI at the time and 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  Daughter reports that the patient was discharged to [facility] with patient with impaired functional status following hospitalization, along with development of cognitive impairment.  Unclear if patient had a perinephric abscess at the time with drainage (?).   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 Wale Huffman at [facility] in Mar 2016 (I reviewed HIE Office notes).    Patient now sent from the SNF following apparently 4 days of  RUQ and RIGHT flank pain with poor PO, nausea.  No chest pain/pressure.  NO fever, no chills, no rigors.  No red blood per rectum or melena.  No diaphoresis.       PAST MEDICAL & SURGICAL HISTORY:  H/O CHF      Hypothyroidism      H/O: HTN (hypertension)      HLD (hyperlipidemia)       novel coronavirus disease (COVID-19)      H/O pyelonephritis      H/O acute cholecystitis      H/O:       Acute cholecystitis          MEDICATIONS  (STANDING):  aspirin enteric coated 81 milliGRAM(s) Oral daily  dextrose 5%. 1000 milliLiter(s) (100 mL/Hr) IV Continuous <Continuous>  dextrose 5%. 1000 milliLiter(s) (50 mL/Hr) IV Continuous <Continuous>  dextrose 50% Injectable 25 Gram(s) IV Push once  dextrose 50% Injectable 12.5 Gram(s) IV Push once  dextrose 50% Injectable 25 Gram(s) IV Push once  ertapenem  IVPB 500 milliGRAM(s) IV Intermittent every 24 hours  glucagon  Injectable 1 milliGRAM(s) IntraMuscular once  heparin   Injectable 5000 Unit(s) SubCutaneous <User Schedule>  insulin lispro (ADMELOG) corrective regimen sliding scale   SubCutaneous every 6 hours  levothyroxine Injectable 25 MICROGram(s) IV Push at bedtime  sodium chloride 0.9%. 1000 milliLiter(s) (70 mL/Hr) IV Continuous <Continuous>  ticagrelor 90 milliGRAM(s) Oral every 12 hours    MEDICATIONS  (PRN):  dextrose Oral Gel 15 Gram(s) Oral once PRN Blood Glucose LESS THAN 70 milliGRAM(s)/deciliter      FAMILY HISTORY:  Family history of essential hypertension        SOCIAL HISTORY:    [ ] Non-smoker  [ ] Smoker  [ ] Alcohol    Allergies    No Known Allergies    Intolerances    	    REVIEW OF SYSTEMS:  CONSTITUTIONAL: No fever, weight loss, or fatigue  EYES: No eye pain, visual disturbances, or discharge  ENT:  No difficulty hearing, tinnitus, vertigo; No sinus or throat pain  NECK: No pain or stiffness  RESPIRATORY: No cough, wheezing, chills or hemoptysis; No Shortness of Breath  CARDIOVASCULAR: No chest pain, palpitations, passing out, dizziness, or leg swelling  GASTROINTESTINAL: No abdominal or epigastric pain. No nausea, vomiting, or hematemesis; No diarrhea or constipation. No melena or hematochezia.  GENITOURINARY: No dysuria, frequency, hematuria, or incontinence  NEUROLOGICAL: No headaches, memory loss, loss of strength, numbness, or tremors  SKIN: No itching, burning, rashes, or lesions   LYMPH Nodes: No enlarged glands  ENDOCRINE: No heat or cold intolerance; No hair loss  MUSCULOSKELETAL: No joint pain or swelling; No muscle, back, or extremity pain  PSYCHIATRIC: No depression, anxiety, mood swings, or difficulty sleeping  HEME/LYMPH: No easy bruising, or bleeding gums  ALLERGY AND IMMUNOLOGIC: No hives or eczema	    [ ] All others negative	  [ ] Unable to obtain    PHYSICAL EXAM:  T(C): 36.9 (23 @ 10:57), Max: 37.4 (23 @ 17:48)  HR: 100 (23 @ 10:57) (90 - 100)  BP: 113/68 (23 @ 10:57) (104/57 - 127/75)  RR: 18 (23 @ 10:57) (18 - 20)  SpO2: 95% (23 @ 10:57) (92% - 95%)  Wt(kg): --  I&O's Summary      Appearance: Normal	  HEENT:   Normal oral mucosa, PERRL, EOMI	  Lymphatic: No lymphadenopathy  Cardiovascular: Normal S1 S2, No JVD, No murmurs, No edema  Respiratory: Lungs clear to auscultation	  Psychiatry: A & O x 3, Mood & affect appropriate  Gastrointestinal:  Soft, Non-tender, + BS	  Skin: No rashes, No ecchymoses, No cyanosis	  Neurologic: Non-focal  Extremities: Normal range of motion, No clubbing, cyanosis or edema  Vascular: Peripheral pulses palpable 2+ bilaterally    TELEMETRY: 	    ECG:  	  RADIOLOGY:  OTHER: 	  	  LABS:	 	    CARDIAC MARKERS:                              11.8   10.81 )-----------( 372      ( 2023 07:45 )             37.4         133<L>  |  95<L>  |  24<H>  ----------------------------<  152<H>  4.0   |  20<L>  |  1.81<H>    Ca    8.8      2023 07:45  Phos  4.2       Mg     2.1         TPro  7.4  /  Alb  2.9<L>  /  TBili  0.9  /  DBili  x   /  AST  26  /  ALT  10  /  AlkPhos  150<H>      proBNP:   Lipid Profile: Cholesterol --  LDL --  HDL --      HgA1c:   TSH: Thyroid Stimulating Hormone, Serum: 4.62 uIU/mL ( @ 07:45)    PT/INR - ( 2023 21:55 )   PT: 14.3 sec;   INR: 1.23 ratio         PTT - ( 2023 21:55 )  PTT:33.7 sec    PREVIOUS DIAGNOSTIC TESTING:    [ ] Echocardiogram:  [ ]  Catheterization:  [ ] Stress Test:         CHIEF COMPLAINT:Patient is a 82y old  Female who presents with a chief complaint of Sent from [facility] for RUQ abdominal pain since 1/15/23 (2023 10:35)      HPI:   83 y/o F--history from patient not reliable and obtained from patient's adult daughter above by me--patient with a history of an apparently complicated course of hospitalization at [facility]  in 2022 with apparently severe septic shock requiring vasopressors from a complicated pyelonephritis RIGHT with hypotension and respiratory failure S/P intubation /extubation, COVID-19 + at the time (patient COVID-19 vaccinated x 3), with apparent MI at the time and 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  Daughter reports that the patient was discharged to [facility] with patient with impaired functional status following hospitalization, along with development of cognitive impairment.  Unclear if patient had a perinephric abscess at the time with drainage (?).   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 Wale Huffman at [facility] in Mar 2016 (I reviewed HIE Office notes).    Patient now sent from the SNF following apparently 4 days of  RUQ and RIGHT flank pain with poor PO, nausea.  No chest pain/pressure.  NO fever, no chills, no rigors.  No red blood per rectum or melena.  No diaphoresis.       PAST MEDICAL & SURGICAL HISTORY:  H/O CHF      Hypothyroidism      H/O: HTN (hypertension)      HLD (hyperlipidemia)       novel coronavirus disease (COVID-19)      H/O pyelonephritis      H/O acute cholecystitis      H/O:       Acute cholecystitis          MEDICATIONS  (STANDING):  aspirin enteric coated 81 milliGRAM(s) Oral daily  dextrose 5%. 1000 milliLiter(s) (100 mL/Hr) IV Continuous <Continuous>  dextrose 5%. 1000 milliLiter(s) (50 mL/Hr) IV Continuous <Continuous>  dextrose 50% Injectable 25 Gram(s) IV Push once  dextrose 50% Injectable 12.5 Gram(s) IV Push once  dextrose 50% Injectable 25 Gram(s) IV Push once  ertapenem  IVPB 500 milliGRAM(s) IV Intermittent every 24 hours  glucagon  Injectable 1 milliGRAM(s) IntraMuscular once  heparin   Injectable 5000 Unit(s) SubCutaneous <User Schedule>  insulin lispro (ADMELOG) corrective regimen sliding scale   SubCutaneous every 6 hours  levothyroxine Injectable 25 MICROGram(s) IV Push at bedtime  sodium chloride 0.9%. 1000 milliLiter(s) (70 mL/Hr) IV Continuous <Continuous>  ticagrelor 90 milliGRAM(s) Oral every 12 hours    MEDICATIONS  (PRN):  dextrose Oral Gel 15 Gram(s) Oral once PRN Blood Glucose LESS THAN 70 milliGRAM(s)/deciliter      FAMILY HISTORY:  Family history of essential hypertension        SOCIAL HISTORY:    [ ] Non-smoker  [ ] Smoker  [ ] Alcohol    Allergies    No Known Allergies    Intolerances    	    REVIEW OF SYSTEMS:  CONSTITUTIONAL: No fever, weight loss, or fatigue  EYES: No eye pain, visual disturbances, or discharge  ENT:  No difficulty hearing, tinnitus, vertigo; No sinus or throat pain  NECK: No pain or stiffness  RESPIRATORY: No cough, wheezing, chills or hemoptysis; No Shortness of Breath  CARDIOVASCULAR: No chest pain, palpitations, passing out, dizziness, or leg swelling  GASTROINTESTINAL: No abdominal or epigastric pain. No nausea, vomiting, or hematemesis; No diarrhea or constipation. No melena or hematochezia.  GENITOURINARY: No dysuria, frequency, hematuria, or incontinence  NEUROLOGICAL: No headaches, memory loss, loss of strength, numbness, or tremors  SKIN: No itching, burning, rashes, or lesions   LYMPH Nodes: No enlarged glands  ENDOCRINE: No heat or cold intolerance; No hair loss  MUSCULOSKELETAL: No joint pain or swelling; No muscle, back, or extremity pain  PSYCHIATRIC: No depression, anxiety, mood swings, or difficulty sleeping  HEME/LYMPH: No easy bruising, or bleeding gums  ALLERGY AND IMMUNOLOGIC: No hives or eczema	    [ ] All others negative	  [ ] Unable to obtain    PHYSICAL EXAM:  T(C): 36.9 (23 @ 10:57), Max: 37.4 (23 @ 17:48)  HR: 100 (23 @ 10:57) (90 - 100)  BP: 113/68 (23 @ 10:57) (104/57 - 127/75)  RR: 18 (23 @ 10:57) (18 - 20)  SpO2: 95% (23 @ 10:57) (92% - 95%)  Wt(kg): --  I&O's Summary      Appearance: Normal	  HEENT:   Normal oral mucosa, PERRL, EOMI	  Lymphatic: No lymphadenopathy  Cardiovascular: Normal S1 S2, No JVD, No murmurs, No edema  Respiratory: Lungs clear to auscultation	  Psychiatry: A & O x 3, Mood & affect appropriate  Gastrointestinal:  Soft, Non-tender, + BS	  Skin: No rashes, No ecchymoses, No cyanosis	  Neurologic: Non-focal  Extremities: Normal range of motion, No clubbing, cyanosis or edema  Vascular: Peripheral pulses palpable 2+ bilaterally    TELEMETRY: 	    ECG:  	  RADIOLOGY:  OTHER: 	  	  LABS:	 	    CARDIAC MARKERS:                              11.8   10.81 )-----------( 372      ( 2023 07:45 )             37.4         133<L>  |  95<L>  |  24<H>  ----------------------------<  152<H>  4.0   |  20<L>  |  1.81<H>    Ca    8.8      2023 07:45  Phos  4.2       Mg     2.1         TPro  7.4  /  Alb  2.9<L>  /  TBili  0.9  /  DBili  x   /  AST  26  /  ALT  10  /  AlkPhos  150<H>      proBNP:   Lipid Profile: Cholesterol --  LDL --  HDL --      HgA1c:   TSH: Thyroid Stimulating Hormone, Serum: 4.62 uIU/mL ( @ 07:45)    PT/INR - ( 2023 21:55 )   PT: 14.3 sec;   INR: 1.23 ratio         PTT - ( 2023 21:55 )  PTT:33.7 sec    PREVIOUS DIAGNOSTIC TESTING:    [ ] Echocardiogram:  [ ]  Catheterization:  [ ] Stress Test:         CHIEF COMPLAINT:Patient is a 82y old  Female who presents with a chief complaint of Sent from [facility] for RUQ abdominal pain since 1/15/23 (2023 10:35)      HPI:   81 y/o F--history from patient not reliable and obtained from patient's adult daughter above by me--patient with a history of an apparently complicated course of hospitalization at [facility]  in 2022 with apparently severe septic shock requiring vasopressors from a complicated pyelonephritis RIGHT with hypotension and respiratory failure S/P intubation /extubation, COVID-19 + at the time (patient COVID-19 vaccinated x 3), with apparent MI at the time and 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  Daughter reports that the patient was discharged to [facility] with patient with impaired functional status following hospitalization, along with development of cognitive impairment.  Unclear if patient had a perinephric abscess at the time with drainage (?).   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 Wale Huffman at [facility] in Mar 2016 (I reviewed HIE Office notes).    Patient now sent from the SNF following apparently 4 days of  RUQ and RIGHT flank pain with poor PO, nausea.  No chest pain/pressure.  NO fever, no chills, no rigors.  No red blood per rectum or melena.  No diaphoresis.       PAST MEDICAL & SURGICAL HISTORY:  H/O CHF      Hypothyroidism      H/O: HTN (hypertension)      HLD (hyperlipidemia)       novel coronavirus disease (COVID-19)      H/O pyelonephritis      H/O acute cholecystitis      H/O:       Acute cholecystitis          MEDICATIONS  (STANDING):  aspirin enteric coated 81 milliGRAM(s) Oral daily  dextrose 5%. 1000 milliLiter(s) (100 mL/Hr) IV Continuous <Continuous>  dextrose 5%. 1000 milliLiter(s) (50 mL/Hr) IV Continuous <Continuous>  dextrose 50% Injectable 25 Gram(s) IV Push once  dextrose 50% Injectable 12.5 Gram(s) IV Push once  dextrose 50% Injectable 25 Gram(s) IV Push once  ertapenem  IVPB 500 milliGRAM(s) IV Intermittent every 24 hours  glucagon  Injectable 1 milliGRAM(s) IntraMuscular once  heparin   Injectable 5000 Unit(s) SubCutaneous <User Schedule>  insulin lispro (ADMELOG) corrective regimen sliding scale   SubCutaneous every 6 hours  levothyroxine Injectable 25 MICROGram(s) IV Push at bedtime  sodium chloride 0.9%. 1000 milliLiter(s) (70 mL/Hr) IV Continuous <Continuous>  ticagrelor 90 milliGRAM(s) Oral every 12 hours    MEDICATIONS  (PRN):  dextrose Oral Gel 15 Gram(s) Oral once PRN Blood Glucose LESS THAN 70 milliGRAM(s)/deciliter      FAMILY HISTORY:  Family history of essential hypertension        SOCIAL HISTORY:    [ ] Non-smoker  [ ] Smoker  [ ] Alcohol    Allergies    No Known Allergies    Intolerances    	    REVIEW OF SYSTEMS:  CONSTITUTIONAL: No fever, weight loss, or fatigue  EYES: No eye pain, visual disturbances, or discharge  ENT:  No difficulty hearing, tinnitus, vertigo; No sinus or throat pain  NECK: No pain or stiffness  RESPIRATORY: No cough, wheezing, chills or hemoptysis; No Shortness of Breath  CARDIOVASCULAR: No chest pain, palpitations, passing out, dizziness, or leg swelling  GASTROINTESTINAL: No abdominal or epigastric pain. No nausea, vomiting, or hematemesis; No diarrhea or constipation. No melena or hematochezia.  GENITOURINARY: No dysuria, frequency, hematuria, or incontinence  NEUROLOGICAL: No headaches, memory loss, loss of strength, numbness, or tremors  SKIN: No itching, burning, rashes, or lesions   LYMPH Nodes: No enlarged glands  ENDOCRINE: No heat or cold intolerance; No hair loss  MUSCULOSKELETAL: No joint pain or swelling; No muscle, back, or extremity pain  PSYCHIATRIC: No depression, anxiety, mood swings, or difficulty sleeping  HEME/LYMPH: No easy bruising, or bleeding gums  ALLERGY AND IMMUNOLOGIC: No hives or eczema	    [ ] All others negative	  [ ] Unable to obtain    PHYSICAL EXAM:  T(C): 36.9 (23 @ 10:57), Max: 37.4 (23 @ 17:48)  HR: 100 (23 @ 10:57) (90 - 100)  BP: 113/68 (23 @ 10:57) (104/57 - 127/75)  RR: 18 (23 @ 10:57) (18 - 20)  SpO2: 95% (23 @ 10:57) (92% - 95%)  Wt(kg): --  I&O's Summary      Appearance: Normal	  HEENT:   Normal oral mucosa, PERRL, EOMI	  Lymphatic: No lymphadenopathy  Cardiovascular: Normal S1 S2, No JVD, No murmurs, No edema  Respiratory: Lungs clear to auscultation	  Psychiatry: A & O x 3, Mood & affect appropriate  Gastrointestinal:  Soft, Non-tender, + BS	  Skin: No rashes, No ecchymoses, No cyanosis	  Neurologic: Non-focal  Extremities: Normal range of motion, No clubbing, cyanosis or edema  Vascular: Peripheral pulses palpable 2+ bilaterally    TELEMETRY: 	    ECG:  	  RADIOLOGY:  OTHER: 	  	  LABS:	 	    CARDIAC MARKERS:                              11.8   10.81 )-----------( 372      ( 2023 07:45 )             37.4         133<L>  |  95<L>  |  24<H>  ----------------------------<  152<H>  4.0   |  20<L>  |  1.81<H>    Ca    8.8      2023 07:45  Phos  4.2       Mg     2.1         TPro  7.4  /  Alb  2.9<L>  /  TBili  0.9  /  DBili  x   /  AST  26  /  ALT  10  /  AlkPhos  150<H>      proBNP:   Lipid Profile: Cholesterol --  LDL --  HDL --      HgA1c:   TSH: Thyroid Stimulating Hormone, Serum: 4.62 uIU/mL ( @ 07:45)    PT/INR - ( 2023 21:55 )   PT: 14.3 sec;   INR: 1.23 ratio         PTT - ( 2023 21:55 )  PTT:33.7 sec    PREVIOUS DIAGNOSTIC TESTING:    [ ] Echocardiogram:  [ ]  Catheterization:  [ ] Stress Test:         CHIEF COMPLAINT:Patient is a 82y old  Female who presents with a chief complaint of Sent from [facility] for RUQ abdominal pain since 1/15/23 (2023 10:35)      HPI:   81 y/o F--history from patient not reliable and obtained from patient's adult daughter above by me--patient with a history of an apparently complicated course of hospitalization at [facility]  in 2022 with apparently severe septic shock requiring vasopressors from a complicated pyelonephritis RIGHT with hypotension and respiratory failure S/P intubation /extubation, COVID-19 + at the time (patient COVID-19 vaccinated x 3), with apparent MI at the time and 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  Daughter reports that the patient was discharged to [facility] with patient with impaired functional status following hospitalization, along with development of cognitive impairment.  Unclear if patient had a perinephric abscess at the time with drainage (?).   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 Wale Huffman at [facility] in Mar 2016 (I reviewed HIE Office notes).    Patient now sent from the SNF following apparently 4 days of  RUQ and RIGHT flank pain with poor PO, nausea.  No chest pain/pressure.  NO fever, no chills, no rigors.  No red blood per rectum or melena.  No diaphoresis.       PAST MEDICAL & SURGICAL HISTORY:  H/O CHF      Hypothyroidism      H/O: HTN (hypertension)      HLD (hyperlipidemia)       novel coronavirus disease (COVID-19)      H/O pyelonephritis      H/O acute cholecystitis      H/O:       Acute cholecystitis          MEDICATIONS  (STANDING):  aspirin enteric coated 81 milliGRAM(s) Oral daily  dextrose 5%. 1000 milliLiter(s) (100 mL/Hr) IV Continuous <Continuous>  dextrose 5%. 1000 milliLiter(s) (50 mL/Hr) IV Continuous <Continuous>  dextrose 50% Injectable 25 Gram(s) IV Push once  dextrose 50% Injectable 12.5 Gram(s) IV Push once  dextrose 50% Injectable 25 Gram(s) IV Push once  ertapenem  IVPB 500 milliGRAM(s) IV Intermittent every 24 hours  glucagon  Injectable 1 milliGRAM(s) IntraMuscular once  heparin   Injectable 5000 Unit(s) SubCutaneous <User Schedule>  insulin lispro (ADMELOG) corrective regimen sliding scale   SubCutaneous every 6 hours  levothyroxine Injectable 25 MICROGram(s) IV Push at bedtime  sodium chloride 0.9%. 1000 milliLiter(s) (70 mL/Hr) IV Continuous <Continuous>  ticagrelor 90 milliGRAM(s) Oral every 12 hours    MEDICATIONS  (PRN):  dextrose Oral Gel 15 Gram(s) Oral once PRN Blood Glucose LESS THAN 70 milliGRAM(s)/deciliter      FAMILY HISTORY:  Family history of essential hypertension        SOCIAL HISTORY:    [ ] Non-smoker  [ ] Smoker  [ ] Alcohol    Allergies    No Known Allergies    Intolerances    	    REVIEW OF SYSTEMS:  CONSTITUTIONAL: No fever, weight loss, or fatigue  EYES: No eye pain, visual disturbances, or discharge  ENT:  No difficulty hearing, tinnitus, vertigo; No sinus or throat pain  NECK: No pain or stiffness  RESPIRATORY: No cough, wheezing, chills or hemoptysis; No Shortness of Breath  CARDIOVASCULAR: No chest pain, palpitations, passing out, dizziness, or leg swelling  GASTROINTESTINAL: + abdominal or epigastric pain. No nausea, vomiting, or hematemesis; No diarrhea or constipation. No melena or hematochezia.  GENITOURINARY: No dysuria, frequency, hematuria, or incontinence  NEUROLOGICAL: No headaches, memory loss, loss of strength, numbness, or tremors  SKIN: No itching, burning, rashes, or lesions   LYMPH Nodes: No enlarged glands  ENDOCRINE: No heat or cold intolerance; No hair loss  MUSCULOSKELETAL: No joint pain or swelling; No muscle, back, or extremity pain  PSYCHIATRIC: No depression, anxiety, mood swings, or difficulty sleeping  HEME/LYMPH: No easy bruising, or bleeding gums  ALLERGY AND IMMUNOLOGIC: No hives or eczema	    [ ] All others negative	  [x ] Unable to obtain    PHYSICAL EXAM:  T(C): 36.9 (23 @ 10:57), Max: 37.4 (23 @ 17:48)  HR: 100 (23 @ 10:57) (90 - 100)  BP: 113/68 (23 @ 10:57) (104/57 - 127/75)  RR: 18 (23 @ 10:57) (18 - 20)  SpO2: 95% (23 @ 10:57) (92% - 95%)  Wt(kg): --  I&O's Summary      Appearance: Normal	  HEENT:   Normal oral mucosa, PERRL, EOMI	  Lymphatic: No lymphadenopathy  Cardiovascular: Normal S1 S2, No JVD, + murmurs, No edema  Respiratory: rhonchi  Gastrointestinal:  Soft, +ruq tender, + BS	  Skin: No rashes, No ecchymoses, No cyanosis	  Neurologic: Non-focal  Extremities: Normal range of motion, No clubbing, cyanosis or edema      TELEMETRY: 	    ECG:  	  RADIOLOGY:  OTHER: 	  	  LABS:	 	    CARDIAC MARKERS:                              11.8   10.81 )-----------( 372      ( 2023 07:45 )             37.4         133<L>  |  95<L>  |  24<H>  ----------------------------<  152<H>  4.0   |  20<L>  |  1.81<H>    Ca    8.8      2023 07:45  Phos  4.2       Mg     2.1         TPro  7.4  /  Alb  2.9<L>  /  TBili  0.9  /  DBili  x   /  AST  26  /  ALT  10  /  AlkPhos  150<H>      proBNP:   Lipid Profile: Cholesterol --  LDL --  HDL --      HgA1c:   TSH: Thyroid Stimulating Hormone, Serum: 4.62 uIU/mL ( @ 07:45)    PT/INR - ( 2023 21:55 )   PT: 14.3 sec;   INR: 1.23 ratio         PTT - ( 2023 21:55 )  PTT:33.7 sec    PREVIOUS DIAGNOSTIC TESTING:    < from: 12 Lead ECG (23 @ 17:06) >  Diagnosis Line NORMAL SINUS RHYTHM  NONSPECIFIC ST AND T WAVE ABNORMALITY    ABNORMAL ECG  NO PREVIOUS ECGS AVAILABLE    < from: Xray Chest 1 View AP/PA (23 @ 17:59) >  Right basilar linear atelectasis    < from: CT Head No Cont (23 @ 03:16) >  IMPRESSION: Age-appropriate involutional and ischemic gliotic changes. No   hemorrhage.    < from: CT Abdomen and Pelvis No Cont (23 @ 19:40) >  Gallbladder distention with marked wall thickening and surrounding   inflammatory changes/fluid. Probable layering sludge. Findings are   concerning for acute cholecystitis. Correlate with same day ultrasound.    Retained contrast of bilateral renal parenchyma likelyreflects degree of   renal dysfunction/nephropathy, correlate for date of contrast   administration. Striated appearance of the kidneys, right greater than   left likely reflects infection/pyelonephritis.    Severe anterior wedging deformity of L3 vertebral body, favored to be   chronic. Correlate with clinical symptoms.    3 mm right middle lobe nodule can be followed dedicated chest CT in 12   months, or as per patient risk factors.      - case reviewed  - risk of bleeding outweighs benefit of procedure, recommend conservative/medical management   - if possible, consider changing to heparin gtt (brilinta would need to held x7days/ asa x5days due to high risk procedure)  - if patient decompensates please reconsult for re-evaluation of drainage  - d/w primary team             CHIEF COMPLAINT:Patient is a 82y old  Female who presents with a chief complaint of Sent from [facility] for RUQ abdominal pain since 1/15/23 (2023 10:35)      HPI:   81 y/o F--history from patient not reliable and obtained from patient's adult daughter above by me--patient with a history of an apparently complicated course of hospitalization at [facility]  in 2022 with apparently severe septic shock requiring vasopressors from a complicated pyelonephritis RIGHT with hypotension and respiratory failure S/P intubation /extubation, COVID-19 + at the time (patient COVID-19 vaccinated x 3), with apparent MI at the time and 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  Daughter reports that the patient was discharged to [facility] with patient with impaired functional status following hospitalization, along with development of cognitive impairment.  Unclear if patient had a perinephric abscess at the time with drainage (?).   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 Wale Huffman at [facility] in Mar 2016 (I reviewed [facility] Office notes).    Patient now sent from the SNF following apparently 4 days of  RUQ and RIGHT flank pain with poor PO, nausea.  No chest pain/pressure.  NO fever, no chills, no rigors.  No red blood per rectum or melena.  No diaphoresis.       PAST MEDICAL & SURGICAL HISTORY:  H/O CHF      Hypothyroidism      H/O: HTN (hypertension)      HLD (hyperlipidemia)       novel coronavirus disease (COVID-19)      H/O pyelonephritis      H/O acute cholecystitis      H/O:       Acute cholecystitis          MEDICATIONS  (STANDING):  aspirin enteric coated 81 milliGRAM(s) Oral daily  dextrose 5%. 1000 milliLiter(s) (100 mL/Hr) IV Continuous <Continuous>  dextrose 5%. 1000 milliLiter(s) (50 mL/Hr) IV Continuous <Continuous>  dextrose 50% Injectable 25 Gram(s) IV Push once  dextrose 50% Injectable 12.5 Gram(s) IV Push once  dextrose 50% Injectable 25 Gram(s) IV Push once  ertapenem  IVPB 500 milliGRAM(s) IV Intermittent every 24 hours  glucagon  Injectable 1 milliGRAM(s) IntraMuscular once  heparin   Injectable 5000 Unit(s) SubCutaneous <User Schedule>  insulin lispro (ADMELOG) corrective regimen sliding scale   SubCutaneous every 6 hours  levothyroxine Injectable 25 MICROGram(s) IV Push at bedtime  sodium chloride 0.9%. 1000 milliLiter(s) (70 mL/Hr) IV Continuous <Continuous>  ticagrelor 90 milliGRAM(s) Oral every 12 hours    MEDICATIONS  (PRN):  dextrose Oral Gel 15 Gram(s) Oral once PRN Blood Glucose LESS THAN 70 milliGRAM(s)/deciliter      FAMILY HISTORY:  Family history of essential hypertension        SOCIAL HISTORY:    [ ] Non-smoker  [ ] Smoker  [ ] Alcohol    Allergies    No Known Allergies    Intolerances    	    REVIEW OF SYSTEMS:  CONSTITUTIONAL: No fever, weight loss, or fatigue  EYES: No eye pain, visual disturbances, or discharge  ENT:  No difficulty hearing, tinnitus, vertigo; No sinus or throat pain  NECK: No pain or stiffness  RESPIRATORY: No cough, wheezing, chills or hemoptysis; No Shortness of Breath  CARDIOVASCULAR: No chest pain, palpitations, passing out, dizziness, or leg swelling  GASTROINTESTINAL: + abdominal or epigastric pain. No nausea, vomiting, or hematemesis; No diarrhea or constipation. No melena or hematochezia.  GENITOURINARY: No dysuria, frequency, hematuria, or incontinence  NEUROLOGICAL: No headaches, memory loss, loss of strength, numbness, or tremors  SKIN: No itching, burning, rashes, or lesions   LYMPH Nodes: No enlarged glands  ENDOCRINE: No heat or cold intolerance; No hair loss  MUSCULOSKELETAL: No joint pain or swelling; No muscle, back, or extremity pain  PSYCHIATRIC: No depression, anxiety, mood swings, or difficulty sleeping  HEME/LYMPH: No easy bruising, or bleeding gums  ALLERGY AND IMMUNOLOGIC: No hives or eczema	    [ ] All others negative	  [x ] Unable to obtain    PHYSICAL EXAM:  T(C): 36.9 (23 @ 10:57), Max: 37.4 (23 @ 17:48)  HR: 100 (23 @ 10:57) (90 - 100)  BP: 113/68 (23 @ 10:57) (104/57 - 127/75)  RR: 18 (23 @ 10:57) (18 - 20)  SpO2: 95% (23 @ 10:57) (92% - 95%)  Wt(kg): --  I&O's Summary      Appearance: Normal	  HEENT:   Normal oral mucosa, PERRL, EOMI	  Lymphatic: No lymphadenopathy  Cardiovascular: Normal S1 S2, No JVD, + murmurs, No edema  Respiratory: rhonchi  Gastrointestinal:  Soft, +ruq tender, + BS	  Skin: No rashes, No ecchymoses, No cyanosis	  Neurologic: Non-focal  Extremities: Normal range of motion, No clubbing, cyanosis or edema      TELEMETRY: 	    ECG:  	  RADIOLOGY:  OTHER: 	  	  LABS:	 	    CARDIAC MARKERS:                              11.8   10.81 )-----------( 372      ( 2023 07:45 )             37.4         133<L>  |  95<L>  |  24<H>  ----------------------------<  152<H>  4.0   |  20<L>  |  1.81<H>    Ca    8.8      2023 07:45  Phos  4.2       Mg     2.1         TPro  7.4  /  Alb  2.9<L>  /  TBili  0.9  /  DBili  x   /  AST  26  /  ALT  10  /  AlkPhos  150<H>      proBNP:   Lipid Profile: Cholesterol --  LDL --  HDL --      HgA1c:   TSH: Thyroid Stimulating Hormone, Serum: 4.62 uIU/mL ( @ 07:45)    PT/INR - ( 2023 21:55 )   PT: 14.3 sec;   INR: 1.23 ratio         PTT - ( 2023 21:55 )  PTT:33.7 sec    PREVIOUS DIAGNOSTIC TESTING:    < from: 12 Lead ECG (23 @ 17:06) >  Diagnosis Line NORMAL SINUS RHYTHM  NONSPECIFIC ST AND T WAVE ABNORMALITY    ABNORMAL ECG  NO PREVIOUS ECGS AVAILABLE    < from: Xray Chest 1 View AP/PA (23 @ 17:59) >  Right basilar linear atelectasis    < from: CT Head No Cont (23 @ 03:16) >  IMPRESSION: Age-appropriate involutional and ischemic gliotic changes. No   hemorrhage.    < from: CT Abdomen and Pelvis No Cont (23 @ 19:40) >  Gallbladder distention with marked wall thickening and surrounding   inflammatory changes/fluid. Probable layering sludge. Findings are   concerning for acute cholecystitis. Correlate with same day ultrasound.    Retained contrast of bilateral renal parenchyma likelyreflects degree of   renal dysfunction/nephropathy, correlate for date of contrast   administration. Striated appearance of the kidneys, right greater than   left likely reflects infection/pyelonephritis.    Severe anterior wedging deformity of L3 vertebral body, favored to be   chronic. Correlate with clinical symptoms.    3 mm right middle lobe nodule can be followed dedicated chest CT in 12   months, or as per patient risk factors.      - case reviewed  - risk of bleeding outweighs benefit of procedure, recommend conservative/medical management   - if possible, consider changing to heparin gtt (brilinta would need to held x7days/ asa x5days due to high risk procedure)  - if patient decompensates please reconsult for re-evaluation of drainage  - d/w primary team             CHIEF COMPLAINT:Patient is a 82y old  Female who presents with a chief complaint of Sent from [facility] for RUQ abdominal pain since 1/15/23 (2023 10:35)      HPI:   83 y/o F--history from patient not reliable and obtained from patient's adult daughter above by me--patient with a history of an apparently complicated course of hospitalization at [facility]  in 2022 with apparently severe septic shock requiring vasopressors from a complicated pyelonephritis RIGHT with hypotension and respiratory failure S/P intubation /extubation, COVID-19 + at the time (patient COVID-19 vaccinated x 3), with apparent MI at the time and 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  Daughter reports that the patient was discharged to [facility] with patient with impaired functional status following hospitalization, along with development of cognitive impairment.  Unclear if patient had a perinephric abscess at the time with drainage (?).   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 Wale Huffman at [facility] in Mar 2016 (I reviewed [facility] Office notes).    Patient now sent from the SNF following apparently 4 days of  RUQ and RIGHT flank pain with poor PO, nausea.  No chest pain/pressure.  NO fever, no chills, no rigors.  No red blood per rectum or melena.  No diaphoresis.       PAST MEDICAL & SURGICAL HISTORY:  H/O CHF      Hypothyroidism      H/O: HTN (hypertension)      HLD (hyperlipidemia)       novel coronavirus disease (COVID-19)      H/O pyelonephritis      H/O acute cholecystitis      H/O:       Acute cholecystitis          MEDICATIONS  (STANDING):  aspirin enteric coated 81 milliGRAM(s) Oral daily  dextrose 5%. 1000 milliLiter(s) (100 mL/Hr) IV Continuous <Continuous>  dextrose 5%. 1000 milliLiter(s) (50 mL/Hr) IV Continuous <Continuous>  dextrose 50% Injectable 25 Gram(s) IV Push once  dextrose 50% Injectable 12.5 Gram(s) IV Push once  dextrose 50% Injectable 25 Gram(s) IV Push once  ertapenem  IVPB 500 milliGRAM(s) IV Intermittent every 24 hours  glucagon  Injectable 1 milliGRAM(s) IntraMuscular once  heparin   Injectable 5000 Unit(s) SubCutaneous <User Schedule>  insulin lispro (ADMELOG) corrective regimen sliding scale   SubCutaneous every 6 hours  levothyroxine Injectable 25 MICROGram(s) IV Push at bedtime  sodium chloride 0.9%. 1000 milliLiter(s) (70 mL/Hr) IV Continuous <Continuous>  ticagrelor 90 milliGRAM(s) Oral every 12 hours    MEDICATIONS  (PRN):  dextrose Oral Gel 15 Gram(s) Oral once PRN Blood Glucose LESS THAN 70 milliGRAM(s)/deciliter      FAMILY HISTORY:  Family history of essential hypertension        SOCIAL HISTORY:    [ ] Non-smoker  [ ] Smoker  [ ] Alcohol    Allergies    No Known Allergies    Intolerances    	    REVIEW OF SYSTEMS:  CONSTITUTIONAL: No fever, weight loss, or fatigue  EYES: No eye pain, visual disturbances, or discharge  ENT:  No difficulty hearing, tinnitus, vertigo; No sinus or throat pain  NECK: No pain or stiffness  RESPIRATORY: No cough, wheezing, chills or hemoptysis; No Shortness of Breath  CARDIOVASCULAR: No chest pain, palpitations, passing out, dizziness, or leg swelling  GASTROINTESTINAL: + abdominal or epigastric pain. No nausea, vomiting, or hematemesis; No diarrhea or constipation. No melena or hematochezia.  GENITOURINARY: No dysuria, frequency, hematuria, or incontinence  NEUROLOGICAL: No headaches, memory loss, loss of strength, numbness, or tremors  SKIN: No itching, burning, rashes, or lesions   LYMPH Nodes: No enlarged glands  ENDOCRINE: No heat or cold intolerance; No hair loss  MUSCULOSKELETAL: No joint pain or swelling; No muscle, back, or extremity pain  PSYCHIATRIC: No depression, anxiety, mood swings, or difficulty sleeping  HEME/LYMPH: No easy bruising, or bleeding gums  ALLERGY AND IMMUNOLOGIC: No hives or eczema	    [ ] All others negative	  [x ] Unable to obtain    PHYSICAL EXAM:  T(C): 36.9 (23 @ 10:57), Max: 37.4 (23 @ 17:48)  HR: 100 (23 @ 10:57) (90 - 100)  BP: 113/68 (23 @ 10:57) (104/57 - 127/75)  RR: 18 (23 @ 10:57) (18 - 20)  SpO2: 95% (23 @ 10:57) (92% - 95%)  Wt(kg): --  I&O's Summary      Appearance: Normal	  HEENT:   Normal oral mucosa, PERRL, EOMI	  Lymphatic: No lymphadenopathy  Cardiovascular: Normal S1 S2, No JVD, + murmurs, No edema  Respiratory: rhonchi  Gastrointestinal:  Soft, +ruq tender, + BS	  Skin: No rashes, No ecchymoses, No cyanosis	  Neurologic: Non-focal  Extremities: Normal range of motion, No clubbing, cyanosis or edema      TELEMETRY: 	    ECG:  	  RADIOLOGY:  OTHER: 	  	  LABS:	 	    CARDIAC MARKERS:                              11.8   10.81 )-----------( 372      ( 2023 07:45 )             37.4         133<L>  |  95<L>  |  24<H>  ----------------------------<  152<H>  4.0   |  20<L>  |  1.81<H>    Ca    8.8      2023 07:45  Phos  4.2       Mg     2.1         TPro  7.4  /  Alb  2.9<L>  /  TBili  0.9  /  DBili  x   /  AST  26  /  ALT  10  /  AlkPhos  150<H>      proBNP:   Lipid Profile: Cholesterol --  LDL --  HDL --      HgA1c:   TSH: Thyroid Stimulating Hormone, Serum: 4.62 uIU/mL ( @ 07:45)    PT/INR - ( 2023 21:55 )   PT: 14.3 sec;   INR: 1.23 ratio         PTT - ( 2023 21:55 )  PTT:33.7 sec    PREVIOUS DIAGNOSTIC TESTING:    < from: 12 Lead ECG (23 @ 17:06) >  Diagnosis Line NORMAL SINUS RHYTHM  NONSPECIFIC ST AND T WAVE ABNORMALITY    ABNORMAL ECG  NO PREVIOUS ECGS AVAILABLE    < from: Xray Chest 1 View AP/PA (23 @ 17:59) >  Right basilar linear atelectasis    < from: CT Head No Cont (23 @ 03:16) >  IMPRESSION: Age-appropriate involutional and ischemic gliotic changes. No   hemorrhage.    < from: CT Abdomen and Pelvis No Cont (23 @ 19:40) >  Gallbladder distention with marked wall thickening and surrounding   inflammatory changes/fluid. Probable layering sludge. Findings are   concerning for acute cholecystitis. Correlate with same day ultrasound.    Retained contrast of bilateral renal parenchyma likelyreflects degree of   renal dysfunction/nephropathy, correlate for date of contrast   administration. Striated appearance of the kidneys, right greater than   left likely reflects infection/pyelonephritis.    Severe anterior wedging deformity of L3 vertebral body, favored to be   chronic. Correlate with clinical symptoms.    3 mm right middle lobe nodule can be followed dedicated chest CT in 12   months, or as per patient risk factors.      - case reviewed  - risk of bleeding outweighs benefit of procedure, recommend conservative/medical management   - if possible, consider changing to heparin gtt (brilinta would need to held x7days/ asa x5days due to high risk procedure)  - if patient decompensates please reconsult for re-evaluation of drainage  - d/w primary team

## 2023-01-20 LAB
A1C WITH ESTIMATED AVERAGE GLUCOSE RESULT: 6.3 % — HIGH (ref 4–5.6)
ANION GAP SERPL CALC-SCNC: 15 MMOL/L — SIGNIFICANT CHANGE UP (ref 5–17)
BUN SERPL-MCNC: 24 MG/DL — HIGH (ref 7–23)
CALCIUM SERPL-MCNC: 8.2 MG/DL — LOW (ref 8.4–10.5)
CHLORIDE SERPL-SCNC: 100 MMOL/L — SIGNIFICANT CHANGE UP (ref 96–108)
CO2 SERPL-SCNC: 21 MMOL/L — LOW (ref 22–31)
CREAT SERPL-MCNC: 1.76 MG/DL — HIGH (ref 0.5–1.3)
EGFR: 28 ML/MIN/1.73M2 — LOW
ESTIMATED AVERAGE GLUCOSE: 134 MG/DL — HIGH (ref 68–114)
GLUCOSE BLDC GLUCOMTR-MCNC: 108 MG/DL — HIGH (ref 70–99)
GLUCOSE BLDC GLUCOMTR-MCNC: 138 MG/DL — HIGH (ref 70–99)
GLUCOSE BLDC GLUCOMTR-MCNC: 155 MG/DL — HIGH (ref 70–99)
GLUCOSE BLDC GLUCOMTR-MCNC: 195 MG/DL — HIGH (ref 70–99)
GLUCOSE BLDC GLUCOMTR-MCNC: 209 MG/DL — HIGH (ref 70–99)
GLUCOSE SERPL-MCNC: 161 MG/DL — HIGH (ref 70–99)
HCT VFR BLD CALC: 34.9 % — SIGNIFICANT CHANGE UP (ref 34.5–45)
HGB BLD-MCNC: 11.3 G/DL — LOW (ref 11.5–15.5)
MCHC RBC-ENTMCNC: 29.5 PG — SIGNIFICANT CHANGE UP (ref 27–34)
MCHC RBC-ENTMCNC: 32.4 GM/DL — SIGNIFICANT CHANGE UP (ref 32–36)
MCV RBC AUTO: 91.1 FL — SIGNIFICANT CHANGE UP (ref 80–100)
NRBC # BLD: 0 /100 WBCS — SIGNIFICANT CHANGE UP (ref 0–0)
PLATELET # BLD AUTO: 333 K/UL — SIGNIFICANT CHANGE UP (ref 150–400)
POTASSIUM SERPL-MCNC: 4.3 MMOL/L — SIGNIFICANT CHANGE UP (ref 3.5–5.3)
POTASSIUM SERPL-SCNC: 4.3 MMOL/L — SIGNIFICANT CHANGE UP (ref 3.5–5.3)
RBC # BLD: 3.83 M/UL — SIGNIFICANT CHANGE UP (ref 3.8–5.2)
RBC # FLD: 15.5 % — HIGH (ref 10.3–14.5)
SODIUM SERPL-SCNC: 136 MMOL/L — SIGNIFICANT CHANGE UP (ref 135–145)
WBC # BLD: 19.45 K/UL — HIGH (ref 3.8–10.5)
WBC # FLD AUTO: 19.45 K/UL — HIGH (ref 3.8–10.5)

## 2023-01-20 PROCEDURE — 99232 SBSQ HOSP IP/OBS MODERATE 35: CPT | Mod: GC

## 2023-01-20 RX ORDER — INSULIN LISPRO 100/ML
VIAL (ML) SUBCUTANEOUS AT BEDTIME
Refills: 0 | Status: DISCONTINUED | OUTPATIENT
Start: 2023-01-20 | End: 2023-01-25

## 2023-01-20 RX ORDER — CHLORHEXIDINE GLUCONATE 213 G/1000ML
1 SOLUTION TOPICAL
Refills: 0 | Status: DISCONTINUED | OUTPATIENT
Start: 2023-01-20 | End: 2023-01-25

## 2023-01-20 RX ORDER — INSULIN LISPRO 100/ML
VIAL (ML) SUBCUTANEOUS
Refills: 0 | Status: DISCONTINUED | OUTPATIENT
Start: 2023-01-20 | End: 2023-01-25

## 2023-01-20 RX ADMIN — TICAGRELOR 90 MILLIGRAM(S): 90 TABLET ORAL at 05:05

## 2023-01-20 RX ADMIN — HEPARIN SODIUM 5000 UNIT(S): 5000 INJECTION INTRAVENOUS; SUBCUTANEOUS at 11:20

## 2023-01-20 RX ADMIN — Medication 2: at 01:49

## 2023-01-20 RX ADMIN — PIPERACILLIN AND TAZOBACTAM 25 GRAM(S): 4; .5 INJECTION, POWDER, LYOPHILIZED, FOR SOLUTION INTRAVENOUS at 05:05

## 2023-01-20 RX ADMIN — Medication 81 MILLIGRAM(S): at 11:19

## 2023-01-20 RX ADMIN — PIPERACILLIN AND TAZOBACTAM 25 GRAM(S): 4; .5 INJECTION, POWDER, LYOPHILIZED, FOR SOLUTION INTRAVENOUS at 14:18

## 2023-01-20 RX ADMIN — HEPARIN SODIUM 5000 UNIT(S): 5000 INJECTION INTRAVENOUS; SUBCUTANEOUS at 01:50

## 2023-01-20 RX ADMIN — PIPERACILLIN AND TAZOBACTAM 25 GRAM(S): 4; .5 INJECTION, POWDER, LYOPHILIZED, FOR SOLUTION INTRAVENOUS at 22:02

## 2023-01-20 RX ADMIN — Medication 1: at 05:04

## 2023-01-20 NOTE — PROGRESS NOTE ADULT - REASON FOR ADMISSION
Sent from [facility] for RUQ abdominal pain since 1/15/23 Sent from [facility] for RUQ abdominal pain since 1/15/23 Sent from [facility] for RUQ abdominal pain since 1/15/23

## 2023-01-20 NOTE — PROGRESS NOTE ADULT - PROBLEM SELECTOR PLAN 6
See above.    Would consider clarifying history and recent workup from last hospitalization at [facility]. See above.    Would consider clarifying history and recent workup from last hospitalization at [facility]. See above.    Would consider clarifying history and recent workup from last hospitalization at [facility].

## 2023-01-20 NOTE — PROGRESS NOTE ADULT - REASON FOR ADMISSION
Sent from [facility] for RUQ abdominal pain since 1/15/23 Sent from [facility] for RUQ abdominal pain since 1/15/23 Sent from [facility] for RUQ abdominal pain since 1/15/23

## 2023-01-20 NOTE — PROGRESS NOTE ADULT - SUBJECTIVE AND OBJECTIVE BOX
CARDIOLOGY     PROGRESS  NOTE   ________________________________________________    CHIEF COMPLAINT:Patient is a 82y old  Female who presents with a chief complaint of Sent from [facility] for RUQ abdominal pain since 1/15/23 (19 Jan 2023 17:30)  comfortable  	  REVIEW OF SYSTEMS:  CONSTITUTIONAL: No fever, weight loss, or fatigue  EYES: No eye pain, visual disturbances, or discharge  ENT:  No difficulty hearing, tinnitus, vertigo; No sinus or throat pain  NECK: No pain or stiffness  RESPIRATORY: No cough, wheezing, chills or hemoptysis; No Shortness of Breath  CARDIOVASCULAR: No chest pain, palpitations, passing out, dizziness, or leg swelling  GASTROINTESTINAL: No abdominal or epigastric pain. No nausea, vomiting, or hematemesis; No diarrhea or constipation. No melena or hematochezia.  GENITOURINARY: No dysuria, frequency, hematuria, or incontinence  NEUROLOGICAL: No headaches, memory loss, loss of strength, numbness, or tremors  SKIN: No itching, burning, rashes, or lesions   LYMPH Nodes: No enlarged glands  ENDOCRINE: No heat or cold intolerance; No hair loss  MUSCULOSKELETAL: No joint pain or swelling; No muscle, back, or extremity pain  PSYCHIATRIC: No depression, anxiety, mood swings, or difficulty sleeping  HEME/LYMPH: No easy bruising, or bleeding gums  ALLERGY AND IMMUNOLOGIC: No hives or eczema	    [ ] All others negative	  [x ] Unable to obtain    PHYSICAL EXAM:  T(C): 37.1 (01-20-23 @ 04:21), Max: 37.2 (01-19-23 @ 20:55)  HR: 92 (01-20-23 @ 04:21) (91 - 102)  BP: 105/66 (01-20-23 @ 04:21) (105/66 - 132/74)  RR: 18 (01-20-23 @ 04:21) (18 - 18)  SpO2: 95% (01-20-23 @ 04:21) (94% - 95%)  Wt(kg): --  I&O's Summary    19 Jan 2023 07:01  -  20 Jan 2023 07:00  --------------------------------------------------------  IN: 840 mL / OUT: 0 mL / NET: 840 mL        Appearance: Normal	  HEENT:   Normal oral mucosa, PERRL, EOMI	  Lymphatic: No lymphadenopathy  Cardiovascular: Normal S1 S2, No JVD, + murmurs, No edema  Respiratory:rhonchi  Psychiatry: A & O x 3, Mood & affect appropriate  Gastrointestinal:  Soft, Non-tender, + BS, +ruq pain	  Skin: No rashes, No ecchymoses, No cyanosis	  Extremities: Normal range of motion, No clubbing, cyanosis or edema  Vascular: Peripheral pulses palpable 2+ bilaterally    MEDICATIONS  (STANDING):  aspirin enteric coated 81 milliGRAM(s) Oral daily  dextrose 5%. 1000 milliLiter(s) (100 mL/Hr) IV Continuous <Continuous>  dextrose 5%. 1000 milliLiter(s) (50 mL/Hr) IV Continuous <Continuous>  dextrose 50% Injectable 25 Gram(s) IV Push once  dextrose 50% Injectable 12.5 Gram(s) IV Push once  dextrose 50% Injectable 25 Gram(s) IV Push once  glucagon  Injectable 1 milliGRAM(s) IntraMuscular once  heparin   Injectable 5000 Unit(s) SubCutaneous <User Schedule>  insulin lispro (ADMELOG) corrective regimen sliding scale   SubCutaneous every 6 hours  levothyroxine Injectable 25 MICROGram(s) IV Push at bedtime  piperacillin/tazobactam IVPB.. 3.375 Gram(s) IV Intermittent every 8 hours  sodium chloride 0.9%. 1000 milliLiter(s) (70 mL/Hr) IV Continuous <Continuous>  ticagrelor 90 milliGRAM(s) Oral every 12 hours      TELEMETRY: 	    ECG:  	  RADIOLOGY:  OTHER: 	  	  LABS:	 	    CARDIAC MARKERS:                                11.3   19.45 )-----------( 333      ( 20 Jan 2023 07:04 )             34.9     01-19    133<L>  |  95<L>  |  24<H>  ----------------------------<  152<H>  4.0   |  20<L>  |  1.81<H>    Ca    8.8      19 Jan 2023 07:45  Phos  4.2     01-18  Mg     2.1     01-18    TPro  7.4  /  Alb  2.9<L>  /  TBili  0.9  /  DBili  x   /  AST  26  /  ALT  10  /  AlkPhos  150<H>  01-19    proBNP:   Lipid Profile: Cholesterol --  LDL --  HDL --      HgA1c:   TSH: Thyroid Stimulating Hormone, Serum: 4.62 uIU/mL (01-19 @ 07:45)    PT/INR - ( 18 Jan 2023 21:55 )   PT: 14.3 sec;   INR: 1.23 ratio         PTT - ( 18 Jan 2023 21:55 )  PTT:33.7 sec      Assessment and plan  ---------------------------   83 y/o F--history from patient not reliable and obtained from patient's adult daughter above by me--patient with a history of an apparently complicated course of hospitalization at [facility]  in Nov 2022 with apparently severe septic shock requiring vasopressors from a complicated pyelonephritis RIGHT with hypotension and respiratory failure S/P intubation /extubation, COVID-19 + at the time (patient COVID-19 vaccinated x 3), with apparent MI at the time and 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  Daughter reports that the patient was discharged to [facility] with patient with impaired functional status following hospitalization, along with development of cognitive impairment.  Unclear if patient had a perinephric abscess at the time with drainage (?).   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 Wale Huffman at [facility] in Mar 2016 (I reviewed [facility] Office notes).    Patient now sent from the SNF following apparently 4 days of  RUQ and RIGHT flank pain with poor PO, nausea.  No chest pain/pressure.  NO fever, no chills, no rigors.  No red blood per rectum or melena.  No diaphoresis.   pt with hx of htn, ashd, cva, pt had stent in 2020, pt apparently had increase trop and was started empirically on Brilanta?? since november  if pt needs surgery or intervention may hold Brilanta, will discuss with her cardiologist Dr blum   will adjust cardiac meds  continue asa 81 mg daily  continue abx  beta blocker as tolerated  spoke to her daughter yesterday  KARLEE mckeon  awaiting blood work from today    	                    CARDIOLOGY     PROGRESS  NOTE   ________________________________________________    CHIEF COMPLAINT:Patient is a 82y old  Female who presents with a chief complaint of Sent from [facility] for RUQ abdominal pain since 1/15/23 (19 Jan 2023 17:30)  comfortable  	  REVIEW OF SYSTEMS:  CONSTITUTIONAL: No fever, weight loss, or fatigue  EYES: No eye pain, visual disturbances, or discharge  ENT:  No difficulty hearing, tinnitus, vertigo; No sinus or throat pain  NECK: No pain or stiffness  RESPIRATORY: No cough, wheezing, chills or hemoptysis; No Shortness of Breath  CARDIOVASCULAR: No chest pain, palpitations, passing out, dizziness, or leg swelling  GASTROINTESTINAL: No abdominal or epigastric pain. No nausea, vomiting, or hematemesis; No diarrhea or constipation. No melena or hematochezia.  GENITOURINARY: No dysuria, frequency, hematuria, or incontinence  NEUROLOGICAL: No headaches, memory loss, loss of strength, numbness, or tremors  SKIN: No itching, burning, rashes, or lesions   LYMPH Nodes: No enlarged glands  ENDOCRINE: No heat or cold intolerance; No hair loss  MUSCULOSKELETAL: No joint pain or swelling; No muscle, back, or extremity pain  PSYCHIATRIC: No depression, anxiety, mood swings, or difficulty sleeping  HEME/LYMPH: No easy bruising, or bleeding gums  ALLERGY AND IMMUNOLOGIC: No hives or eczema	    [ ] All others negative	  [x ] Unable to obtain    PHYSICAL EXAM:  T(C): 37.1 (01-20-23 @ 04:21), Max: 37.2 (01-19-23 @ 20:55)  HR: 92 (01-20-23 @ 04:21) (91 - 102)  BP: 105/66 (01-20-23 @ 04:21) (105/66 - 132/74)  RR: 18 (01-20-23 @ 04:21) (18 - 18)  SpO2: 95% (01-20-23 @ 04:21) (94% - 95%)  Wt(kg): --  I&O's Summary    19 Jan 2023 07:01  -  20 Jan 2023 07:00  --------------------------------------------------------  IN: 840 mL / OUT: 0 mL / NET: 840 mL        Appearance: Normal	  HEENT:   Normal oral mucosa, PERRL, EOMI	  Lymphatic: No lymphadenopathy  Cardiovascular: Normal S1 S2, No JVD, + murmurs, No edema  Respiratory:rhonchi  Psychiatry: A & O x 3, Mood & affect appropriate  Gastrointestinal:  Soft, Non-tender, + BS, +ruq pain	  Skin: No rashes, No ecchymoses, No cyanosis	  Extremities: Normal range of motion, No clubbing, cyanosis or edema  Vascular: Peripheral pulses palpable 2+ bilaterally    MEDICATIONS  (STANDING):  aspirin enteric coated 81 milliGRAM(s) Oral daily  dextrose 5%. 1000 milliLiter(s) (100 mL/Hr) IV Continuous <Continuous>  dextrose 5%. 1000 milliLiter(s) (50 mL/Hr) IV Continuous <Continuous>  dextrose 50% Injectable 25 Gram(s) IV Push once  dextrose 50% Injectable 12.5 Gram(s) IV Push once  dextrose 50% Injectable 25 Gram(s) IV Push once  glucagon  Injectable 1 milliGRAM(s) IntraMuscular once  heparin   Injectable 5000 Unit(s) SubCutaneous <User Schedule>  insulin lispro (ADMELOG) corrective regimen sliding scale   SubCutaneous every 6 hours  levothyroxine Injectable 25 MICROGram(s) IV Push at bedtime  piperacillin/tazobactam IVPB.. 3.375 Gram(s) IV Intermittent every 8 hours  sodium chloride 0.9%. 1000 milliLiter(s) (70 mL/Hr) IV Continuous <Continuous>  ticagrelor 90 milliGRAM(s) Oral every 12 hours      TELEMETRY: 	    ECG:  	  RADIOLOGY:  OTHER: 	  	  LABS:	 	    CARDIAC MARKERS:                                11.3   19.45 )-----------( 333      ( 20 Jan 2023 07:04 )             34.9     01-19    133<L>  |  95<L>  |  24<H>  ----------------------------<  152<H>  4.0   |  20<L>  |  1.81<H>    Ca    8.8      19 Jan 2023 07:45  Phos  4.2     01-18  Mg     2.1     01-18    TPro  7.4  /  Alb  2.9<L>  /  TBili  0.9  /  DBili  x   /  AST  26  /  ALT  10  /  AlkPhos  150<H>  01-19    proBNP:   Lipid Profile: Cholesterol --  LDL --  HDL --      HgA1c:   TSH: Thyroid Stimulating Hormone, Serum: 4.62 uIU/mL (01-19 @ 07:45)    PT/INR - ( 18 Jan 2023 21:55 )   PT: 14.3 sec;   INR: 1.23 ratio         PTT - ( 18 Jan 2023 21:55 )  PTT:33.7 sec      Assessment and plan  ---------------------------   83 y/o F--history from patient not reliable and obtained from patient's adult daughter above by me--patient with a history of an apparently complicated course of hospitalization at [facility]  in Nov 2022 with apparently severe septic shock requiring vasopressors from a complicated pyelonephritis RIGHT with hypotension and respiratory failure S/P intubation /extubation, COVID-19 + at the time (patient COVID-19 vaccinated x 3), with apparent MI at the time and 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  Daughter reports that the patient was discharged to [facility] with patient with impaired functional status following hospitalization, along with development of cognitive impairment.  Unclear if patient had a perinephric abscess at the time with drainage (?).   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 Wale Huffman at [facility] in Mar 2016 (I reviewed HIE Office notes).    Patient now sent from the SNF following apparently 4 days of  RUQ and RIGHT flank pain with poor PO, nausea.  No chest pain/pressure.  NO fever, no chills, no rigors.  No red blood per rectum or melena.  No diaphoresis.   pt with hx of htn, ashd, cva, pt had stent in 2020, pt apparently had increase trop and was started empirically on Brilanta?? since november  if pt needs surgery or intervention may hold Brilanta, will discuss with her cardiologist Dr blum   will adjust cardiac meds  continue asa 81 mg daily  continue abx  beta blocker as tolerated  spoke to her daughter yesterday  KARLEE mckeon  awaiting blood work from today    	                    CARDIOLOGY     PROGRESS  NOTE   ________________________________________________    CHIEF COMPLAINT:Patient is a 82y old  Female who presents with a chief complaint of Sent from [facility] for RUQ abdominal pain since 1/15/23 (19 Jan 2023 17:30)  comfortable  	  REVIEW OF SYSTEMS:  CONSTITUTIONAL: No fever, weight loss, or fatigue  EYES: No eye pain, visual disturbances, or discharge  ENT:  No difficulty hearing, tinnitus, vertigo; No sinus or throat pain  NECK: No pain or stiffness  RESPIRATORY: No cough, wheezing, chills or hemoptysis; No Shortness of Breath  CARDIOVASCULAR: No chest pain, palpitations, passing out, dizziness, or leg swelling  GASTROINTESTINAL: No abdominal or epigastric pain. No nausea, vomiting, or hematemesis; No diarrhea or constipation. No melena or hematochezia.  GENITOURINARY: No dysuria, frequency, hematuria, or incontinence  NEUROLOGICAL: No headaches, memory loss, loss of strength, numbness, or tremors  SKIN: No itching, burning, rashes, or lesions   LYMPH Nodes: No enlarged glands  ENDOCRINE: No heat or cold intolerance; No hair loss  MUSCULOSKELETAL: No joint pain or swelling; No muscle, back, or extremity pain  PSYCHIATRIC: No depression, anxiety, mood swings, or difficulty sleeping  HEME/LYMPH: No easy bruising, or bleeding gums  ALLERGY AND IMMUNOLOGIC: No hives or eczema	    [ ] All others negative	  [x ] Unable to obtain    PHYSICAL EXAM:  T(C): 37.1 (01-20-23 @ 04:21), Max: 37.2 (01-19-23 @ 20:55)  HR: 92 (01-20-23 @ 04:21) (91 - 102)  BP: 105/66 (01-20-23 @ 04:21) (105/66 - 132/74)  RR: 18 (01-20-23 @ 04:21) (18 - 18)  SpO2: 95% (01-20-23 @ 04:21) (94% - 95%)  Wt(kg): --  I&O's Summary    19 Jan 2023 07:01  -  20 Jan 2023 07:00  --------------------------------------------------------  IN: 840 mL / OUT: 0 mL / NET: 840 mL        Appearance: Normal	  HEENT:   Normal oral mucosa, PERRL, EOMI	  Lymphatic: No lymphadenopathy  Cardiovascular: Normal S1 S2, No JVD, + murmurs, No edema  Respiratory:rhonchi  Psychiatry: A & O x 3, Mood & affect appropriate  Gastrointestinal:  Soft, Non-tender, + BS, +ruq pain	  Skin: No rashes, No ecchymoses, No cyanosis	  Extremities: Normal range of motion, No clubbing, cyanosis or edema  Vascular: Peripheral pulses palpable 2+ bilaterally    MEDICATIONS  (STANDING):  aspirin enteric coated 81 milliGRAM(s) Oral daily  dextrose 5%. 1000 milliLiter(s) (100 mL/Hr) IV Continuous <Continuous>  dextrose 5%. 1000 milliLiter(s) (50 mL/Hr) IV Continuous <Continuous>  dextrose 50% Injectable 25 Gram(s) IV Push once  dextrose 50% Injectable 12.5 Gram(s) IV Push once  dextrose 50% Injectable 25 Gram(s) IV Push once  glucagon  Injectable 1 milliGRAM(s) IntraMuscular once  heparin   Injectable 5000 Unit(s) SubCutaneous <User Schedule>  insulin lispro (ADMELOG) corrective regimen sliding scale   SubCutaneous every 6 hours  levothyroxine Injectable 25 MICROGram(s) IV Push at bedtime  piperacillin/tazobactam IVPB.. 3.375 Gram(s) IV Intermittent every 8 hours  sodium chloride 0.9%. 1000 milliLiter(s) (70 mL/Hr) IV Continuous <Continuous>  ticagrelor 90 milliGRAM(s) Oral every 12 hours      TELEMETRY: 	    ECG:  	  RADIOLOGY:  OTHER: 	  	  LABS:	 	    CARDIAC MARKERS:                                11.3   19.45 )-----------( 333      ( 20 Jan 2023 07:04 )             34.9     01-19    133<L>  |  95<L>  |  24<H>  ----------------------------<  152<H>  4.0   |  20<L>  |  1.81<H>    Ca    8.8      19 Jan 2023 07:45  Phos  4.2     01-18  Mg     2.1     01-18    TPro  7.4  /  Alb  2.9<L>  /  TBili  0.9  /  DBili  x   /  AST  26  /  ALT  10  /  AlkPhos  150<H>  01-19    proBNP:   Lipid Profile: Cholesterol --  LDL --  HDL --      HgA1c:   TSH: Thyroid Stimulating Hormone, Serum: 4.62 uIU/mL (01-19 @ 07:45)    PT/INR - ( 18 Jan 2023 21:55 )   PT: 14.3 sec;   INR: 1.23 ratio         PTT - ( 18 Jan 2023 21:55 )  PTT:33.7 sec      Assessment and plan  ---------------------------   81 y/o F--history from patient not reliable and obtained from patient's adult daughter above by me--patient with a history of an apparently complicated course of hospitalization at [facility]  in Nov 2022 with apparently severe septic shock requiring vasopressors from a complicated pyelonephritis RIGHT with hypotension and respiratory failure S/P intubation /extubation, COVID-19 + at the time (patient COVID-19 vaccinated x 3), with apparent MI at the time and 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  Daughter reports that the patient was discharged to [facility] with patient with impaired functional status following hospitalization, along with development of cognitive impairment.  Unclear if patient had a perinephric abscess at the time with drainage (?).   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 Wale Huffman at [facility] in Mar 2016 (I reviewed [facility] Office notes).    Patient now sent from the SNF following apparently 4 days of  RUQ and RIGHT flank pain with poor PO, nausea.  No chest pain/pressure.  NO fever, no chills, no rigors.  No red blood per rectum or melena.  No diaphoresis.   pt with hx of htn, ashd, cva, pt had stent in 2020, pt apparently had increase trop and was started empirically on Brilanta?? since november  if pt needs surgery or intervention may hold Brilanta, will discuss with her cardiologist Dr blum   will adjust cardiac meds  continue asa 81 mg daily  continue abx  beta blocker as tolerated  spoke to her daughter yesterday  KARLEE mckeon  awaiting blood work from today

## 2023-01-20 NOTE — PROGRESS NOTE ADULT - ASSESSMENT
NIGHT HOSPITALIST:    Referral of patient to the ER from the SNF with RUQ pain with CTT and RUQ US evidence of acute cholecystitis, triglycerides nondiagnostic and suspect passed stone in the setting of patient with a complex medical history of a prolonged hospitalization at [facility] ([facility]) with severe septic shock requiring vasopressors, intubation/extubation at the time, with complicating MI and CVA, and COVID-19 at the time.  Patient previously independent in the summer 2022 but apparently since hospitalization has manifested progressive cognitive impairment, and was due to be seen at the SNF by a neurologist and cardiologist on followup.    Seen by general surgery recommended poor candidate for alb choly  recommended percutaneous cholecystoscopy tube. Evaluated by IR who recommended to hold Brillanta for 7 days and Aspirin for 5 days with possible procedure on next Thursday for percutaneous cholecystoscopy.     Invanz switched to Zosyn. Continue IV fluid. RUQ tenderness resolved.     NO clear collection on CTT abdomen but limited due to lack of IV contrast due to ELIZABETH.   Will obtain a renal US.   Invanz should cover the potential additional urinary source.    Will obtain a noncontrast CTT head.  Aspiration precautions.   Enhanced supervision.   Would consider formal neurology evaluation in the AM.    Will obtain an echo and would clarify with PCP patient's Jardiance, presumably for history of undifferentiated CHF.    Presently patient is not in decompensated CHF.   Would consider formal cardiology evaluation in the AM.    Will provide FS S/S for now to monitor for excess hyperglycemia and for hypoglycemia.   Nutrition consult.    Would attempt to clarify from prior records at [facility] workup of involuntary weight loss or recent outpatient workup.    Daughter aware of followup of repeat CTT chest upon discharge as outlined with small undifferentiated pulmonary nodules as above.    Daughter agrees to pharmacologic DVT prophylaxis. NIGHT HOSPITALIST:    Referral of patient to the ER from the SNF with RUQ pain with CTT and RUQ US evidence of acute cholecystitis, triglycerides nondiagnostic and suspect passed stone in the setting of patient with a complex medical history of a prolonged hospitalization at [facility] ([facility]) with severe septic shock requiring vasopressors, intubation/extubation at the time, with complicating MI and CVA, and COVID-19 at the time.  Patient previously independent in the summer 2022 but apparently since hospitalization has manifested progressive cognitive impairment, and was due to be seen at the SNF by a neurologist and cardiologist on followup.    Seen by general surgery recommended poor candidate for alb choly  recommended percutaneous cholecystoscopy tube. Evaluated by IR who recommended to hold Brillanta for 7 days and Aspirin for 5 days with possible procedure on next Thursday for percutaneous cholecystoscopy.     Invanz switched to Zosyn. Continue IV fluid. RUQ tenderness resolved.     NO clear collection on CTT abdomen but limited due to lack of IV contrast due to ELIZABETH.   Will obtain a renal US.   Invanz should cover the potential additional urinary source.    Will obtain a noncontrast CTT head.  Aspiration precautions.   Enhanced supervision.   Would consider formal neurology evaluation in the AM.    Will obtain an echo and would clarify with PCP patient's Jardiance, presumably for history of undifferentiated CHF.    Presently patient is not in decompensated CHF.   Would consider formal cardiology evaluation in the AM.    Will provide FS S/S for now to monitor for excess hyperglycemia and for hypoglycemia.   Nutrition consult.    Would attempt to clarify from prior records at [facility] workup of involuntary weight loss or recent outpatient workup.    Daughter aware of followup of repeat CTT chest upon discharge as outlined with small undifferentiated pulmonary nodules as above.    Daughter agrees to pharmacologic DVT prophylaxis. NIGHT HOSPITALIST:    Referral of patient to the ER from the SNF with RUQ pain with CTT and RUQ US evidence of acute cholecystitis, triglycerides nondiagnostic and suspect passed stone in the setting of patient with a complex medical history of a prolonged hospitalization at [facility] ([facility]) with severe septic shock requiring vasopressors, intubation/extubation at the time, with complicating MI and CVA, and COVID-19 at the time.  Patient previously independent in the summer 2022 but apparently since hospitalization has manifested progressive cognitive impairment, and was due to be seen at the SNF by a neurologist and cardiologist on followup.    Seen by general surgery recommended poor candidate for alb choly  recommended percutaneous cholecystoscopy tube. Evaluated by IR who recommended to hold Brillanta for 7 days and Aspirin for 5 days with possible procedure on next Thursday for percutaneous cholecystoscopy.     Invanz switched to Zosyn. Continue IV fluid. RUQ tenderness resolved.     NO clear collection on CTT abdomen but limited due to lack of IV contrast due to ELIZABETH.   Will obtain a renal US.   Invanz should cover the potential additional urinary source.    Will obtain a noncontrast CTT head.  Aspiration precautions.   Enhanced supervision.   Would consider formal neurology evaluation in the AM.    Will obtain an echo and would clarify with PCP patient's Jardiance, presumably for history of undifferentiated CHF.    Presently patient is not in decompensated CHF.   Would consider formal cardiology evaluation in the AM.    Will provide FS S/S for now to monitor for excess hyperglycemia and for hypoglycemia.   Nutrition consult.    Would attempt to clarify from prior records at [facility] workup of involuntary weight loss or recent outpatient workup.    Daughter aware of followup of repeat CTT chest upon discharge as outlined with small undifferentiated pulmonary nodules as above.    Daughter agrees to pharmacologic DVT prophylaxis.

## 2023-01-20 NOTE — PROGRESS NOTE ADULT - SUBJECTIVE AND OBJECTIVE BOX
Follow Up:  acute cholecystitis     Interval History/ROS: no fever but the WBC increased to 19, still with abd pain, no cough or dysuria, IR also stated no IR perc cynthia for risk of bleeding        Allergies  No Known Allergies        ANTIMICROBIALS:  piperacillin/tazobactam IVPB.. 3.375 every 8 hours      OTHER MEDS:  aspirin enteric coated 81 milliGRAM(s) Oral daily  chlorhexidine 2% Cloths 1 Application(s) Topical <User Schedule>  dextrose 5%. 1000 milliLiter(s) IV Continuous <Continuous>  dextrose 5%. 1000 milliLiter(s) IV Continuous <Continuous>  dextrose 50% Injectable 25 Gram(s) IV Push once  dextrose 50% Injectable 12.5 Gram(s) IV Push once  dextrose 50% Injectable 25 Gram(s) IV Push once  dextrose Oral Gel 15 Gram(s) Oral once PRN  glucagon  Injectable 1 milliGRAM(s) IntraMuscular once  heparin   Injectable 5000 Unit(s) SubCutaneous <User Schedule>  insulin lispro (ADMELOG) corrective regimen sliding scale   SubCutaneous every 6 hours  levothyroxine Injectable 25 MICROGram(s) IV Push at bedtime  sodium chloride 0.9%. 1000 milliLiter(s) IV Continuous <Continuous>      Vital Signs Last 24 Hrs  T(C): 36.7 (20 Jan 2023 13:12), Max: 37.2 (19 Jan 2023 20:55)  T(F): 98 (20 Jan 2023 13:12), Max: 99 (19 Jan 2023 20:55)  HR: 85 (20 Jan 2023 13:12) (85 - 102)  BP: 112/69 (20 Jan 2023 13:12) (105/66 - 112/69)  BP(mean): --  RR: 18 (20 Jan 2023 13:12) (18 - 18)  SpO2: 95% (20 Jan 2023 13:12) (95% - 95%)    Parameters below as of 20 Jan 2023 13:12  Patient On (Oxygen Delivery Method): room air        Physical Exam:  General:    NAD, non toxic  Cardio:    regular S1,S2, + murmur  Respiratory:   clear b/l, no wheezing  abd:   soft, BS +, RUQ tenderness  :     no CVAT, no suprapubic tenderness, no scott  Musculoskeletal : no joint swelling, no edema  Skin:    no rash  vascular: no phlebitis                          11.3   19.45 )-----------( 333      ( 20 Jan 2023 07:04 )             34.9       01-20    136  |  100  |  24<H>  ----------------------------<  161<H>  4.3   |  21<L>  |  1.76<H>    Ca    8.2<L>      20 Jan 2023 07:03    TPro  7.4  /  Alb  2.9<L>  /  TBili  0.9  /  DBili  x   /  AST  26  /  ALT  10  /  AlkPhos  150<H>  01-19          MICROBIOLOGY:  v  .Blood Blood-Venous  01-19-23   No growth to date.  --  --                RADIOLOGY:  Images independently visualized and reviewed personally, findings as below  < from: CT Abdomen and Pelvis No Cont (01.18.23 @ 19:40) >  IMPRESSION:    Limited noncontrast study.    Gallbladder distention with marked wall thickening and surrounding   inflammatory changes/fluid. Probable layering sludge. Findings are   concerning for acute cholecystitis. Correlate with same day ultrasound.    Retained contrast of bilateral renal parenchyma likelyreflects degree of   renal dysfunction/nephropathy, correlate for date of contrast   administration. Striated appearance of the kidneys, right greater than   left likely reflects infection/pyelonephritis.    Severe anterior wedging deformity of L3 vertebral body, favored to be   chronic. Correlate with clinical symptoms.    3 mm right middle lobe nodule can be followed dedicated chest CT in 12   months, or as per patient risk factors.    Additional findings as above. Please read above.    < end of copied text >  < from: US Abdomen Upper Quadrant Right (01.18.23 @ 19:02) >  IMPRESSION:  Distended gallbladder and gallbladder wall thickening in the setting of   positive sonographic Pond sign compatible with acute cholecystitis.  Small right upper quadrant ascites.  Small right pleural effusion.      < end of copied text >

## 2023-01-20 NOTE — PROGRESS NOTE ADULT - SUBJECTIVE AND OBJECTIVE BOX
Patient is a 82y old  Female who presents with a chief complaint of Sent [facility] for [facility]Q abdominal pain since 1/15/23 (20 Jan 2023 18:35)      INTERVAL HPI/OVERNIGHT EVENTS: Evaluated by surgeon recommended  only needs percutaneous  cholecystostomy. Evaluated by IR who recommended to hold Brillanta for 7 days and Aspirin for 5 days then they are able to put drain for this patient.  I held Brillanta from today and I asked NP  to hold Aspirin on SUnday and then she will be cleared ro have surgery  on Thursday. Continue IV abx, abx switched from Invanz to Zosyn. Continue IV fluid.     Pain Location & Control: OK    MEDICATIONS  (STANDING):  aspirin enteric coated 81 milliGRAM(s) Oral daily  chlorhexidine 2% Cloths 1 Application(s) Topical <User Schedule>  dextrose 5%. 1000 milliLiter(s) (100 mL/Hr) IV Continuous <Continuous>  dextrose 5%. 1000 milliLiter(s) (50 mL/Hr) IV Continuous <Continuous>  dextrose 50% Injectable 25 Gram(s) IV Push once  dextrose 50% Injectable 12.5 Gram(s) IV Push once  dextrose 50% Injectable 25 Gram(s) IV Push once  glucagon  Injectable 1 milliGRAM(s) IntraMuscular once  heparin   Injectable 5000 Unit(s) SubCutaneous <User Schedule>  insulin lispro (ADMELOG) corrective regimen sliding scale   SubCutaneous three times a day before meals  insulin lispro (ADMELOG) corrective regimen sliding scale   SubCutaneous at bedtime  levothyroxine Injectable 25 MICROGram(s) IV Push at bedtime  piperacillin/tazobactam IVPB.. 3.375 Gram(s) IV Intermittent every 8 hours  sodium chloride 0.9%. 1000 milliLiter(s) (70 mL/Hr) IV Continuous <Continuous>    MEDICATIONS  (PRN):  dextrose Oral Gel 15 Gram(s) Oral once PRN Blood Glucose LESS THAN 70 milliGRAM(s)/deciliter      Allergies    No Known Allergies    Intolerances        REVIEW OF SYSTEMS:  CONSTITUTIONAL: No fever, weight loss, or fatigue  EYES: No eye pain, visual disturbances, or discharge  ENMT:  No difficulty hearing, tinnitus, vertigo; No sinus or throat pain  NECK: No pain or stiffness  BREASTS: No pain, masses, or nipple discharge  RESPIRATORY: No cough, wheezing, chills or hemoptysis; No shortness of breath  CARDIOVASCULAR: No chest pain, palpitations, dizziness, or leg swelling  GASTROINTESTINAL: No abdominal or epigastric pain. No nausea, vomiting, or hematemesis; No diarrhea or constipation. No melena or hematochezia.  GENITOURINARY: No dysuria, frequency, hematuria, or incontinence  NEUROLOGICAL: No headaches, memory loss, loss of strength, numbness, or tremors  SKIN: No itching, burning, rashes, or lesions   LYMPH NODES: No enlarged glands  ENDOCRINE: No heat or cold intolerance; No hair loss; No polydipsia or polyuria  MUSCULOSKELETAL: No back pain  PSYCHIATRIC: No depression, anxiety, mood swings, or difficulty sleeping  HEME/LYMPH: No easy bruising, or bleeding gums  ALLERGY AND IMMUNOLOGIC: No hives or eczema    Vital Signs Last 24 Hrs  T(C): 36.7 (20 Jan 2023 21:31), Max: 37.1 (20 Jan 2023 04:21)  T(F): 98 (20 Jan 2023 21:31), Max: 98.8 (20 Jan 2023 04:21)  HR: 81 (20 Jan 2023 21:31) (81 - 92)  BP: 115/67 (20 Jan 2023 21:31) (105/66 - 115/67)  BP(mean): --  RR: 18 (20 Jan 2023 21:31) (18 - 18)  SpO2: 96% (20 Jan 2023 21:31) (95% - 96%)    Parameters below as of 20 Jan 2023 21:31  Patient On (Oxygen Delivery Method): room air        PHYSICAL EXAM:  GENERAL: NAD, well-groomed, well-developed  HEAD:  Atraumatic, Normocephalic  EYES: EOMI, PERRLA, conjunctiva and sclera clear  ENMT: No tonsillar erythema, exudates, or enlargement; Moist mucous membranes, Good dentition, No lesions  NECK: Supple, No JVD, Normal thyroid  NERVOUS SYSTEM:  Alert & Oriented X3, Good concentration; Motor Strength 5/5 B/L upper and lower extremities; DTRs 2+ intact and symmetric  CHEST/LUNG: Clear to auscultation bilaterally; No rales, rhonchi, wheezing, or rubs  HEART: Regular rate and rhythm; No murmurs, rubs, or gallops  ABDOMEN: Soft, Nontender, Nondistended; Bowel sounds present, RUQ tenderness resolved   EXTREMITIES:  2+ Peripheral Pulses, No clubbing or cyanosis  LYMPH: No lymphadenopathy noted  SKIN: No rashes or lesions      LABS:                        11.3   19.45 )-----------( 333      ( 20 Jan 2023 07:04 )             34.9     20 Jan 2023 07:03    136    |  100    |  24     ----------------------------<  161    4.3     |  21     |  1.76     Ca    8.2        20 Jan 2023 07:03          CAPILLARY BLOOD GLUCOSE      POCT Blood Glucose.: 108 mg/dL (20 Jan 2023 22:00)  POCT Blood Glucose.: 138 mg/dL (20 Jan 2023 13:14)  POCT Blood Glucose.: 155 mg/dL (20 Jan 2023 12:04)  POCT Blood Glucose.: 195 mg/dL (20 Jan 2023 04:47)  POCT Blood Glucose.: 209 mg/dL (20 Jan 2023 01:43)        Cultures  Culture Results:   No growth to date. (01-19-23 @ 05:56)      RADIOLOGY & ADDITIONAL TESTS:    Imaging Personally Reviewed:  [X ] YES  [ ] NO    Consultant(s) Notes Reviewed:  [ X] YES  [ ] NO    Care Discussed with Consultants/Other Providers [ X] YES  [ ] NO

## 2023-01-20 NOTE — PROGRESS NOTE ADULT - ASSESSMENT
82F PMHx early dementia, CHF, hypothyroidism, anxiety, HTN, HLD, vertigo, DM, and gastritis with recent hospitalization 11/2022 at [facility] for urosepsis (fungal) requiring intubation. 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 Discharged 12/5 and at a rehab facility. Daughter reports pt's functional capacity declined severely since hospitalization. She now uses a wheelchair and has lost weight with poor appetite. Pt's care has been at [facility], but her PCP in rehab Dr. Santos recommended she come to [facility]. Pt has worsening r-sided abd pain since Sunday. Imaging suggests acute cholecystitis.    PLAN  - No acute surgical intervention given pt comorbidities  - Continue IV Abx   - consider IR perc cholecystostomy given the rise in leukocytosis  - Clarify [facility]       Red [facility]  [id number] 82F PMHx early dementia, CHF, hypothyroidism, anxiety, HTN, HLD, vertigo, DM, and gastritis with recent hospitalization 11/2022 at [facility] for urosepsis (fungal) requiring intubation. 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 Discharged 12/5 and at a rehab facility. Daughter reports pt's functional capacity declined severely since hospitalization. She now uses a wheelchair and has lost weight with poor appetite. Pt's care has been at [facility], but her PCP in rehab Dr. Santos recommended she come to [facility]. Pt has worsening r-sided abd pain since Sunday. Imaging suggests acute cholecystitis.    PLAN  - No acute surgical intervention given pt comorbidities  - Continue IV Abx   - consider IR perc cholecystostomy given the rise in leukocytosis  - Clarify [facility]       Red Surgery  [id number] 82F PMHx early dementia, CHF, hypothyroidism, anxiety, HTN, HLD, vertigo, DM, and gastritis with recent hospitalization 11/2022 at [facility] for urosepsis (fungal) requiring intubation. 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 Discharged 12/5 and at a rehab facility. Daughter reports pt's functional capacity declined severely since hospitalization. She now uses a wheelchair and has lost weight with poor appetite. Pt's care has been at [facility], but her PCP in rehab Dr. Santos recommended she come to [facility]. Pt has worsening r-sided abd pain since Sunday. Imaging suggests acute cholecystitis.    PLAN  - No acute surgical intervention given pt comorbidities  - Continue IV Abx   - consider IR perc cholecystostomy given the rise in leukocytosis  - Clarify [facility]       Red Surgery  [id number]

## 2023-01-20 NOTE — PROGRESS NOTE ADULT - REASON FOR ADMISSION
Sent from [facility] for RUQ abdominal pain since 1/15/23 Sent from [facility] for RUQ abdominal pain since 1/15/23 Sent from [facility] for RUQ abdominal pain since 1/15/23

## 2023-01-20 NOTE — PROGRESS NOTE ADULT - SUBJECTIVE AND OBJECTIVE BOX
SUBJECTIVE:   Seen and examined at bedside. Increased leukocytosis.     OBJECTIVE: T(C): 37.1 (01-20-23 @ 04:21), Max: 37.2 (01-19-23 @ 20:55)  HR: 92 (01-20-23 @ 04:21) (91 - 102)  BP: 105/66 (01-20-23 @ 04:21) (105/66 - 132/74)  RR: 18 (01-20-23 @ 04:21) (18 - 18)  SpO2: 95% (01-20-23 @ 04:21) (94% - 95%)  Wt(kg): --  I&O's Summary    19 Jan 2023 07:01  -  20 Jan 2023 07:00  --------------------------------------------------------  IN: 840 mL / OUT: 0 mL / NET: 840 mL      I&O's Detail    19 Jan 2023 07:01  -  20 Jan 2023 07:00  --------------------------------------------------------  IN:    sodium chloride 0.9%: 840 mL  Total IN: 840 mL    OUT:  Total OUT: 0 mL    Total NET: 840 mL      Physical Exam  General: tired appearing   Pulmonary: normal resp effort  Abdominal: soft, ND, mildly tender on RUQ     MEDICATIONS  (STANDING):  aspirin enteric coated 81 milliGRAM(s) Oral daily  dextrose 5%. 1000 milliLiter(s) (100 mL/Hr) IV Continuous <Continuous>  dextrose 5%. 1000 milliLiter(s) (50 mL/Hr) IV Continuous <Continuous>  dextrose 50% Injectable 25 Gram(s) IV Push once  dextrose 50% Injectable 12.5 Gram(s) IV Push once  dextrose 50% Injectable 25 Gram(s) IV Push once  glucagon  Injectable 1 milliGRAM(s) IntraMuscular once  heparin   Injectable 5000 Unit(s) SubCutaneous <User Schedule>  insulin lispro (ADMELOG) corrective regimen sliding scale   SubCutaneous every 6 hours  levothyroxine Injectable 25 MICROGram(s) IV Push at bedtime  piperacillin/tazobactam IVPB.. 3.375 Gram(s) IV Intermittent every 8 hours  sodium chloride 0.9%. 1000 milliLiter(s) (70 mL/Hr) IV Continuous <Continuous>  ticagrelor 90 milliGRAM(s) Oral every 12 hours    MEDICATIONS  (PRN):  dextrose Oral Gel 15 Gram(s) Oral once PRN Blood Glucose LESS THAN 70 milliGRAM(s)/deciliter      LABS:                        11.3   19.45 )-----------( 333      ( 20 Jan 2023 07:04 )             34.9     01-20    136  |  100  |  24<H>  ----------------------------<  161<H>  4.3   |  21<L>  |  1.76<H>    Ca    8.2<L>      20 Jan 2023 07:03  Phos  4.2     01-18  Mg     2.1     01-18    TPro  7.4  /  Alb  2.9<L>  /  TBili  0.9  /  DBili  x   /  AST  26  /  ALT  10  /  AlkPhos  150<H>  01-19    PT/INR - ( 18 Jan 2023 21:55 )   PT: 14.3 sec;   INR: 1.23 ratio         PTT - ( 18 Jan 2023 21:55 )  PTT:33.7 sec

## 2023-01-20 NOTE — PROGRESS NOTE ADULT - ATTENDING COMMENTS
date of service 1/20/23    pt seen and examined  pt chart and imaging reviewed in detail  agree with note above  82F PMHx early dementia, CHF, hypothyroidism, anxiety, HTN, HLD, vertigo, DM, and gastritis with recent hospitalization 11/2022 at [facility] for urosepsis (fungal) requiring intubation. 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 Discharged 12/5 and at a rehab facility. Daughter reports pt's functional capacity declined severely since hospitalization. She now uses a wheelchair and has lost weight with poor appetite. Pt's care has been at [facility], but her PCP in rehab Dr. Santos recommended she come to [facility]. Pt has worsening r-sided abd pain since Sunday. Imaging suggests acute cholecystitis.   pt is poor operative candidate at this time.  resting in bed, NAD  anicteric  Soft, + TTP RUQ no r/g  cont npo, ivf, iv abx  serial abd exams  f/u labs in am  wbc up to 19k  cont supportive care per med/cv  consider IR re-consult for perc cynthia  will follow with you  d/w pt & family @ bedside in detail. date of service 1/20/23    pt seen and examined  pt chart and imaging reviewed in detail  agree with note above  82F PMHx early dementia, CHF, hypothyroidism, anxiety, HTN, HLD, vertigo, DM, and gastritis with recent hospitalization 11/2022 at [facility] for urosepsis (fungal) requiring intubation. 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 Discharged 12/5 and at a rehab facility. Daughter reports pt's functional capacity declined severely since hospitalization. She now uses a wheelchair and has lost weight with poor appetite. Pt's care has been at [facility], but her PCP in rehab Dr. Santos recommended she come to [facility]. Pt has worsening r-sided abd pain since Sunday. Imaging suggests acute cholecystitis.   pt is poor operative candidate at this time.  resting in bed, NAD  anicteric  Soft, + TTP RUQ no r/g  cont npo, ivf, iv abx  serial abd exams  f/u labs in am  wbc up to 19k  cont supportive care per med/cv  consider IR re-consult for perc cynthia  will follow with you  d/w pt & family @ bedside in detail. date of service 1/20/23    pt seen and examined  pt chart and imaging reviewed in detail  agree with note above  82F PMHx early dementia, CHF, hypothyroidism, anxiety, HTN, HLD, vertigo, DM, and gastritis with recent hospitalization 11/2022 at [facility] for urosepsis (fungal) requiring intubation. 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 Discharged 12/5 and at a rehab facility. Daughter reports pt's functional capacity declined severely since hospitalization. She now uses a wheelchair and has lost weight with poor appetite. Pt's care has been at [facility], but her PCP in rehab Dr. Santos recommended she come to [facility]. Pt has worsening r-sided abd pain since Sunday. Imaging suggests acute cholecystitis.   pt is poor operative candidate at this time.  resting in bed, NAD  anicteric  Soft, + TTP RUQ no r/g  cont npo, ivf, iv abx  serial abd exams  f/u labs in am  wbc up to 19k  cont supportive care per med/cv  consider IR re-consult for perc cynthia  will follow with you  d/w pt & family @ bedside in detail.

## 2023-01-20 NOTE — PROGRESS NOTE ADULT - ASSESSMENT
82 f with HTN, CAD, CHF, hospitalization at [facility] 11/2022 for septic shock due to citrobacter bacteremia and R pyelo c/b resp failure, MI and CVA, now p/w RUQ pain and nausea  here afebrile, WBC: 12  abd u/s:  Distended gallbladder and gallbladder wall thickening in the setting of positive sonographic Pond sign compatible with acute cholecystitis. Small right upper quadrant ascites.  abd/pelvis CT:  Gallbladder distention with marked wall thickening and surrounding inflammatory changes/fluid. Probable layering sludge, concerning for acute cholecystitis. Striated appearance of the kidneys, right greater than left likely reflects infection/pyelonephritis.     leukocytosis, RUQ pain due to acute cholecystitis  blood cx negative,  surgery stated no surgical interventions in view pt's comorbidities and IR stated no IR perc cynthia for risk of bleeding now WBC increased to 19  * c/w zosyn  * monitor CBC/diff and LFTs  * if no improvement on WBC then will need IR  for perc cynthia    The above assessment and plan was discussed with the primary team    Daniella Ribeiro MD  contact on teams  After 5pm and on weekends call [phone]     82 f with HTN, CAD, CHF, hospitalization at [facility] 11/2022 for septic shock due to citrobacter bacteremia and R pyelo c/b resp failure, MI and CVA, now p/w RUQ pain and nausea  here afebrile, WBC: 12  abd u/s:  Distended gallbladder and gallbladder wall thickening in the setting of positive sonographic Pond sign compatible with acute cholecystitis. Small right upper quadrant ascites.  abd/pelvis CT:  Gallbladder distention with marked wall thickening and surrounding inflammatory changes/fluid. Probable layering sludge, concerning for acute cholecystitis. Striated appearance of the kidneys, right greater than left likely reflects infection/pyelonephritis.     leukocytosis, RUQ pain due to acute cholecystitis  blood cx negative,  surgery stated no surgical interventions in view pt's comorbidities and IR stated no IR perc cynthia for risk of bleeding now WBC increased to 19  * c/w zosyn  * monitor CBC/diff and LFTs  * if no improvement on WBC then will need IR  for perc cynthia    The above assessment and plan was discussed with the primary team    Daniella Ribeiro MD  contact on teams  After 5pm and on weekends call [phone]     82 f with HTN, CAD, CHF, hospitalization at [facility] 11/2022 for septic shock due to citrobacter bacteremia and R pyelo c/b resp failure, MI and CVA, now p/w RUQ pain and nausea  here afebrile, WBC: 12  abd u/s:  Distended gallbladder and gallbladder wall thickening in the setting of positive sonographic Pond sign compatible with acute cholecystitis. Small right upper quadrant ascites.  abd/pelvis CT:  Gallbladder distention with marked wall thickening and surrounding inflammatory changes/fluid. Probable layering sludge, concerning for acute cholecystitis. Striated appearance of the kidneys, right greater than left likely reflects infection/pyelonephritis.     leukocytosis, RUQ pain due to acute cholecystitis  blood cx negative,  surgery stated no surgical interventions in view pt's comorbidities and IR stated no IR perc cynthia for risk of bleeding now WBC increased to 19  * c/w zosyn  * monitor CBC/diff and LFTs  * if no improvement on WBC then will need IR  for perc cynthia    The above assessment and plan was discussed with the primary team    Daniella Ribeiro MD  contact on teams  After 5pm and on weekends call [phone]

## 2023-01-20 NOTE — PROGRESS NOTE ADULT - REASON FOR ADMISSION
Sent from [facility] for RUQ abdominal pain since 1/15/23 Sent from [facility] for RUQ abdominal pain since 1/15/23 Sent from [facility] for RUQ abdominal pain since 1/15/23

## 2023-01-21 LAB
ALBUMIN SERPL ELPH-MCNC: 2.5 G/DL — LOW (ref 3.3–5)
ALP SERPL-CCNC: 176 U/L — HIGH (ref 40–120)
ALT FLD-CCNC: 12 U/L — SIGNIFICANT CHANGE UP (ref 10–45)
ANION GAP SERPL CALC-SCNC: 17 MMOL/L — SIGNIFICANT CHANGE UP (ref 5–17)
AST SERPL-CCNC: 27 U/L — SIGNIFICANT CHANGE UP (ref 10–40)
BILIRUB SERPL-MCNC: 0.6 MG/DL — SIGNIFICANT CHANGE UP (ref 0.2–1.2)
BUN SERPL-MCNC: 20 MG/DL — SIGNIFICANT CHANGE UP (ref 7–23)
CALCIUM SERPL-MCNC: 8.3 MG/DL — LOW (ref 8.4–10.5)
CHLORIDE SERPL-SCNC: 103 MMOL/L — SIGNIFICANT CHANGE UP (ref 96–108)
CO2 SERPL-SCNC: 18 MMOL/L — LOW (ref 22–31)
CREAT SERPL-MCNC: 1.27 MG/DL — SIGNIFICANT CHANGE UP (ref 0.5–1.3)
EGFR: 42 ML/MIN/1.73M2 — LOW
GLUCOSE BLDC GLUCOMTR-MCNC: 130 MG/DL — HIGH (ref 70–99)
GLUCOSE BLDC GLUCOMTR-MCNC: 132 MG/DL — HIGH (ref 70–99)
GLUCOSE BLDC GLUCOMTR-MCNC: 134 MG/DL — HIGH (ref 70–99)
GLUCOSE BLDC GLUCOMTR-MCNC: 171 MG/DL — HIGH (ref 70–99)
GLUCOSE SERPL-MCNC: 107 MG/DL — HIGH (ref 70–99)
MRSA PCR RESULT.: SIGNIFICANT CHANGE UP
POTASSIUM SERPL-MCNC: 4.2 MMOL/L — SIGNIFICANT CHANGE UP (ref 3.5–5.3)
POTASSIUM SERPL-SCNC: 4.2 MMOL/L — SIGNIFICANT CHANGE UP (ref 3.5–5.3)
PROT SERPL-MCNC: 6.7 G/DL — SIGNIFICANT CHANGE UP (ref 6–8.3)
S AUREUS DNA NOSE QL NAA+PROBE: DETECTED
SODIUM SERPL-SCNC: 138 MMOL/L — SIGNIFICANT CHANGE UP (ref 135–145)

## 2023-01-21 RX ORDER — ACETAMINOPHEN 500 MG
1000 TABLET ORAL ONCE
Refills: 0 | Status: COMPLETED | OUTPATIENT
Start: 2023-01-21 | End: 2023-01-22

## 2023-01-21 RX ADMIN — PIPERACILLIN AND TAZOBACTAM 25 GRAM(S): 4; .5 INJECTION, POWDER, LYOPHILIZED, FOR SOLUTION INTRAVENOUS at 05:04

## 2023-01-21 RX ADMIN — PIPERACILLIN AND TAZOBACTAM 25 GRAM(S): 4; .5 INJECTION, POWDER, LYOPHILIZED, FOR SOLUTION INTRAVENOUS at 16:22

## 2023-01-21 RX ADMIN — Medication 25 MICROGRAM(S): at 21:46

## 2023-01-21 RX ADMIN — Medication 81 MILLIGRAM(S): at 11:34

## 2023-01-21 RX ADMIN — PIPERACILLIN AND TAZOBACTAM 25 GRAM(S): 4; .5 INJECTION, POWDER, LYOPHILIZED, FOR SOLUTION INTRAVENOUS at 21:46

## 2023-01-21 RX ADMIN — Medication 25 MICROGRAM(S): at 00:02

## 2023-01-21 RX ADMIN — Medication 1: at 12:37

## 2023-01-21 RX ADMIN — HEPARIN SODIUM 5000 UNIT(S): 5000 INJECTION INTRAVENOUS; SUBCUTANEOUS at 11:34

## 2023-01-21 RX ADMIN — HEPARIN SODIUM 5000 UNIT(S): 5000 INJECTION INTRAVENOUS; SUBCUTANEOUS at 05:04

## 2023-01-21 RX ADMIN — CHLORHEXIDINE GLUCONATE 1 APPLICATION(S): 213 SOLUTION TOPICAL at 06:54

## 2023-01-21 NOTE — DIETITIAN INITIAL EVALUATION ADULT - NSFNSGIIOFT_GEN_A_CORE
Pt's daughter denies pt with any current N/V/D, reports pt with current constipation. No BM documented per flowsheets. No current bowel regimen in place.

## 2023-01-21 NOTE — PROGRESS NOTE ADULT - REASON FOR ADMISSION
Sent from [facility] for RUQ abdominal pain since 1/15/23 Sent from [facility] for RUQ abdominal pain since 1/15/23 Sent from [facility] for RUQ abdominal pain since 1/15/23

## 2023-01-21 NOTE — DIETITIAN INITIAL EVALUATION ADULT - ETIOLOGY
Increased physiological demand for nutrients and wound healing decreased ability to consume adequate protein-energy in setting of increased nutrient needs

## 2023-01-21 NOTE — DIETITIAN INITIAL EVALUATION ADULT - REASON INDICATOR FOR ASSESSMENT
Nutrition Consult for Nutrition support team (DM2, Hx of CHF, poor PO, Acute cholecystitis) / pressure injury stage 2 or >.   Source: Pt's daughter via telephone (Dorota [phone]), EMR. Pt noted to be confused, asleep at time of visit.   Chart reviewed, events noted.  Nutrition Consult for Nutrition support team (DM2, Hx of CHF, poor PO, Acute cholecystitis) / pressure injury stage 2 or >.   Source: Pt's daughter via telephone (Dorota [phone]), EMR. Pt noted to be confused, asleep at time of visit.   Chart reviewed, events noted.  Nutrition Consult for Nutrition support team (DM2, Hx of CHF, poor PO, Acute cholecystitis) / pressure injury stage 2 or >.   Source: Pt's daughter via telephone (Dorota [phone]), EMR. Pt noted to be confused, asleep at time of visit.   Chart reviewed, events noted.

## 2023-01-21 NOTE — DIETITIAN INITIAL EVALUATION ADULT - REASON FOR ADMISSION
Cholecystitis    Per chart, pt is a 82y old  Female who presents with a chief complaint of Sent from [facility] for RUQ abdominal pain since 1/15/23. 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  Daughter reports that the patient was discharged to [facility] with patient with impaired functional status following hospitalization, along with development of cognitive impairment.  Unclear if patient had a perinephric abscess at the time with drainage (?).   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  Daughter reports that the patient was discharged to [facility] with patient with impaired functional status following hospitalization, along with development of cognitive impairment.  Unclear if patient had a perinephric abscess at the time with drainage (?).   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  Daughter reports that the patient was discharged to [facility] with patient with impaired functional status following hospitalization, along with development of cognitive impairment.  Unclear if patient had a perinephric abscess at the time with drainage (?).   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

## 2023-01-21 NOTE — DIETITIAN INITIAL EVALUATION ADULT - NSFNSADHERENCEPTAFT_GEN_A_CORE
Pt's daughter reports pt monitors sugar intake.   Per pt's daughter, at-home Medications for DM: insulin; reports pt was previously on Ozempic, believes this contributed to pt's decreased appetite.   A1c 6.3% (1/20) - indicates good glycemic control.

## 2023-01-21 NOTE — DIETITIAN INITIAL EVALUATION ADULT - NSFNSNUTRHOMESUPPLEMENTFT_GEN_A_CORE
Pt's daughter denies pt taking any vitamins/minerals at home; reports pt previously drinking Glucerna oral nutrition supplements.

## 2023-01-21 NOTE — DIETITIAN INITIAL EVALUATION ADULT - PERTINENT LABORATORY DATA
01-21    138  |  103  |  20  ----------------------------<  107<H>  4.2   |  18<L>  |  1.27    Ca    8.3<L>      21 Jan 2023 07:07    TPro  6.7  /  Alb  2.5<L>  /  TBili  0.6  /  DBili  x   /  AST  27  /  ALT  12  /  AlkPhos  176<H>  01-21  POCT Blood Glucose.: 130 mg/dL (01-21-23 @ 08:00)  A1C with Estimated Average Glucose Result: 6.3 % (01-20-23 @ 07:04)

## 2023-01-21 NOTE — DIETITIAN INITIAL EVALUATION ADULT - REASON
Unable to perform Nutrition Focused Physical Assessment in current setting secondary to pt asleep and non-responsive to verbal stimuli; RD will reassess as appropriate.

## 2023-01-21 NOTE — DIETITIAN NUTRITION RISK NOTIFICATION - ADDITIONAL COMMENTS/DIETITIAN RECOMMENDATIONS
1) Recommend liberalizing to Low Fat diet as tolerated to promote PO intake. Recommend change in texture/consistency to: chopped.   2) Recommend providing Glucerna 2x/day to promote adequate PO intake.  3) Recommend providing MVI and vitamin C daily to promote wound healing pending no medical contraindications.   4) Continue to monitor PO intake, weight, labs, skin, GI status, and diet.  5) Provide assistance/encouragement with meals PRN to promote adequate PO intake. [place] food preferences as able.  6) RD remains available for diet education/reinforcement PRN.   1) Recommend liberalizing to Low Fat diet as tolerated to promote PO intake. Recommend change in texture/consistency to: chopped.   2) Recommend providing Glucerna 2x/day to promote adequate PO intake.  3) Recommend providing MVI and vitamin C daily to promote wound healing pending no medical contraindications.   4) Continue to monitor PO intake, weight, labs, skin, GI status, and diet.  5) Provide assistance/encouragement with meals PRN to promote adequate PO intake. [place] food preferences as able.  6) RD remains available for diet education/reinforcement PRN.   1) Recommend liberalizing to Low Fat diet as tolerated to promote PO intake. Recommend change in texture/consistency to: chopped.   2) Recommend providing Glucerna 2x/day to promote adequate PO intake.  3) Recommend providing MVI and vitamin C daily to promote wound healing pending no medical contraindications.   4) Continue to monitor PO intake, weight, labs, skin, GI status, and diet.  5) Provide assistance/encouragement with meals PRN to promote adequate PO intake. [place] food preferences as able.  6) RD remains available for diet education/reinforcement PRN.

## 2023-01-21 NOTE — DIETITIAN NUTRITION RISK NOTIFICATION - TREATMENT: THE FOLLOWING DIET HAS BEEN RECOMMENDED
Diet, Regular:   Consistent Carbohydrate {Evening Snack} (CSTCHOSN)  Low Fat (LOWFAT) (01-20-23 @ 19:15) [Active]

## 2023-01-21 NOTE — PROGRESS NOTE ADULT - ASSESSMENT
82F PMHx early dementia, CHF, hypothyroidism, anxiety, HTN, HLD, vertigo, DM, and gastritis with recent hospitalization 11/2022 at [facility] for urosepsis (fungal) requiring intubation. 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 Discharged 12/5 and at a rehab facility. Daughter reports pt's functional capacity declined severely since hospitalization. She now uses a wheelchair and has lost weight with poor appetite. Pt's care has been at [facility], but her PCP in rehab Dr. Santos recommended she come to [facility]. Pt has worsening r-sided abd pain since Sunday. Imaging suggests acute cholecystitis.    RECOMMENDATIONS:  - Keep NPO  - No acute surgical intervention given pt comorbidities  - Continue IV Abx   - consider IR perc cholecystostomy given the rise in leukocytosis  - Clarify [facility]     Red Surgery  [id number] 82F PMHx early dementia, CHF, hypothyroidism, anxiety, HTN, HLD, vertigo, DM, and gastritis with recent hospitalization 11/2022 at [facility] for urosepsis (fungal) requiring intubation. 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 Discharged 12/5 and at a rehab facility. Daughter reports pt's functional capacity declined severely since hospitalization. She now uses a wheelchair and has lost weight with poor appetite. Pt's care has been at [facility], but her PCP in rehab Dr. Santos recommended she come to [facility]. Pt has worsening r-sided abd pain since Sunday. Imaging suggests acute cholecystitis.    RECOMMENDATIONS:  - Keep NPO  - No acute surgical intervention given pt comorbidities  - Continue IV Abx   - consider IR perc cholecystostomy given the rise in leukocytosis  - Clarify [facility]     Red Surgery  [id number] 82F PMHx early dementia, CHF, hypothyroidism, anxiety, HTN, HLD, vertigo, DM, and gastritis with recent hospitalization 11/2022 at [facility] for urosepsis (fungal) requiring intubation. 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 Discharged 12/5 and at a rehab facility. Daughter reports pt's functional capacity declined severely since hospitalization. She now uses a wheelchair and has lost weight with poor appetite. Pt's care has been at [facility], but her PCP in rehab Dr. Santos recommended she come to [facility]. Pt has worsening r-sided abd pain since Sunday. Imaging suggests acute cholecystitis.    RECOMMENDATIONS:  - Keep NPO  - No acute surgical intervention given pt comorbidities  - Continue IV Abx   - consider IR perc cholecystostomy given the rise in leukocytosis  - Clarify [facility]     Red Surgery  [id number]

## 2023-01-21 NOTE — DIETITIAN INITIAL EVALUATION ADULT - PERSON TAUGHT/METHOD
Emphasized importance of encouraging adequate consumption of meals/supplements to optimize protein-energy intake. Reviewed Low Fat diet: limiting saturated and trans fat intake, reviewed examples of foods with saturated/trans fats, discussed alternatives in cooking to limit fats. Briefly reviewed nutrition therapy for T2DM: limiting refined sugars, following insulin regimen. Briefly discussed limiting Na for HF. Pt's daughter made aware RD remains available PRN./verbal instruction/daughter instructed

## 2023-01-21 NOTE — PROGRESS NOTE ADULT - ATTENDING COMMENTS
date of service 1/21/23    pt seen and examined  pt chart and imaging reviewed in detail  agree with note above  82F PMHx early dementia, CHF, hypothyroidism, anxiety, HTN, HLD, vertigo, DM, and gastritis with recent hospitalization 11/2022 at [facility] for urosepsis (fungal) requiring intubation. 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 Discharged 12/5 and at a rehab facility. Daughter reports pt's functional capacity declined severely since hospitalization. She now uses a wheelchair and has lost weight with poor appetite. Pt's care has been at [facility], but her PCP in rehab Dr. Santos recommended she come to [facility]. Pt has worsening r-sided abd pain since Sunday. Imaging suggests acute cholecystitis.   pt is poor operative candidate at this time.  resting in bed, NAD  anicteric  Soft, + TTP RUQ no r/g  cont npo, ivf, iv abx  serial abd exams  f/u labs in am  wbc up to 19k, cbc pending today   cont supportive care per med/cv/ID  consider IR re-consult for perc cynthia  will follow with you  d/w pt & family @ bedside in detail. date of service 1/21/23    pt seen and examined  pt chart and imaging reviewed in detail  agree with note above  82F PMHx early dementia, CHF, hypothyroidism, anxiety, HTN, HLD, vertigo, DM, and gastritis with recent hospitalization 11/2022 at [facility] for urosepsis (fungal) requiring intubation. 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 Discharged 12/5 and at a rehab facility. Daughter reports pt's functional capacity declined severely since hospitalization. She now uses a wheelchair and has lost weight with poor appetite. Pt's care has been at [facility], but her PCP in rehab Dr. Santos recommended she come to [facility]. Pt has worsening r-sided abd pain since Sunday. Imaging suggests acute cholecystitis.   pt is poor operative candidate at this time.  resting in bed, NAD  anicteric  Soft, + TTP RUQ no r/g  cont npo, ivf, iv abx  serial abd exams  f/u labs in am  wbc up to 19k, cbc pending today   cont supportive care per med/cv/ID  consider IR re-consult for perc cynthia  will follow with you  d/w pt & family @ bedside in detail. date of service 1/21/23    pt seen and examined  pt chart and imaging reviewed in detail  agree with note above  82F PMHx early dementia, CHF, hypothyroidism, anxiety, HTN, HLD, vertigo, DM, and gastritis with recent hospitalization 11/2022 at [facility] for urosepsis (fungal) requiring intubation. 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 Discharged 12/5 and at a rehab facility. Daughter reports pt's functional capacity declined severely since hospitalization. She now uses a wheelchair and has lost weight with poor appetite. Pt's care has been at [facility], but her PCP in rehab Dr. Santos recommended she come to [facility]. Pt has worsening r-sided abd pain since Sunday. Imaging suggests acute cholecystitis.   pt is poor operative candidate at this time.  resting in bed, NAD  anicteric  Soft, + TTP RUQ no r/g  cont npo, ivf, iv abx  serial abd exams  f/u labs in am  wbc up to 19k, cbc pending today   cont supportive care per med/cv/ID  consider IR re-consult for perc cynthia  will follow with you  d/w pt & family @ bedside in detail.

## 2023-01-21 NOTE — DIETITIAN INITIAL EVALUATION ADULT - PERTINENT MEDS FT
MEDICATIONS  (STANDING):  aspirin enteric coated 81 milliGRAM(s) Oral daily  chlorhexidine 2% Cloths 1 Application(s) Topical <User Schedule>  dextrose 5%. 1000 milliLiter(s) (100 mL/Hr) IV Continuous <Continuous>  dextrose 5%. 1000 milliLiter(s) (50 mL/Hr) IV Continuous <Continuous>  dextrose 50% Injectable 25 Gram(s) IV Push once  dextrose 50% Injectable 12.5 Gram(s) IV Push once  dextrose 50% Injectable 25 Gram(s) IV Push once  glucagon  Injectable 1 milliGRAM(s) IntraMuscular once  heparin   Injectable 5000 Unit(s) SubCutaneous <User Schedule>  insulin lispro (ADMELOG) corrective regimen sliding scale   SubCutaneous three times a day before meals  insulin lispro (ADMELOG) corrective regimen sliding scale   SubCutaneous at bedtime  levothyroxine Injectable 25 MICROGram(s) IV Push at bedtime  piperacillin/tazobactam IVPB.. 3.375 Gram(s) IV Intermittent every 8 hours  sodium chloride 0.9%. 1000 milliLiter(s) (70 mL/Hr) IV Continuous <Continuous>    MEDICATIONS  (PRN):  dextrose Oral Gel 15 Gram(s) Oral once PRN Blood Glucose LESS THAN 70 milliGRAM(s)/deciliter

## 2023-01-21 NOTE — DIETITIAN INITIAL EVALUATION ADULT - NS FNS DIET ORDER
Diet, Regular:   Consistent Carbohydrate {Evening Snack} (CSTCHOSN)  Low Fat (LOWFAT) (01-20-23 @ 19:15)

## 2023-01-21 NOTE — DIETITIAN INITIAL EVALUATION ADULT - ORAL INTAKE PTA/DIET HISTORY
Pt's daughter reports pt with decreased appetite and PO intake x November 2022 PTA; confirms pt with NKFA.

## 2023-01-21 NOTE — DIETITIAN INITIAL EVALUATION ADULT - ENERGY INTAKE
In house, pt's daughter reports pt with poor PO intake, endorses she c/o institutional food but also refuses to eat outside food brought in by family. Food preferences obtained by pt's daughter.

## 2023-01-21 NOTE — DIETITIAN INITIAL EVALUATION ADULT - ADD RECOMMEND
1) Recommend liberalizing to Low Fat diet as tolerated to promote PO intake. Recommend change in texture/consistency to: chopped.   2) Recommend providing Glucerna 2x/day to promote adequate PO intake.  3) Recommend providing MVI and vitamin C daily to promote wound healing pending no medical contraindications.   4) Continue to monitor PO intake, weight, labs, skin, GI status, and diet.  5) Provide assistance/encouragement with meals PRN to promote adequate PO intake. [place] food preferences as able.  6) RD remains available for diet education/reinforcement PRN.  7) Malnutrition sticker placed in chart.  1) Recommend liberalizing to Low Fat diet as tolerated to promote PO intake. Recommend change in texture/consistency to: chopped.   2) Recommend providing Glucerna 2x/day to promote adequate PO intake.  3) Recommend providing MVI and vitamin C daily to promote wound healing pending no medical contraindications.   4) Continue to monitor PO intake, weight, labs, skin, GI status, and diet.  5) Provide assistance/encouragement with meals PRN to promote adequate PO intake. [place] food preferences as able.  6) RD remains available for diet education/reinforcement PRN.  7) Malnutrition sticker placed in chart.  1) Recommend liberalizing to Low Fat diet as tolerated to promote PO intake. Recommend change in texture/consistency to: chopped.   2) Recommend providing Glucerna 2x/day to promote adequate PO intake.  3) Recommend providing MVI and vitamin C daily to promote wound healing pending no medical contraindications.   4) Continue to monitor PO intake, weight, labs, skin, GI status, and diet.  5) Provide assistance/encouragement with meals PRN to promote adequate PO intake. [place] food preferences as able.  6) RD remains available for diet education/reinforcement PRN.  7) Malnutrition sticker placed in chart.

## 2023-01-21 NOTE — PROGRESS NOTE ADULT - SUBJECTIVE AND OBJECTIVE BOX
Overnight events:   - No acute events    SUBJECTIVE:  Patient was seen and examined.    OBJECTIVE:  Vital Signs Last 24 Hrs  T(C): 36.8 (21 Jan 2023 10:42), Max: 36.8 (21 Jan 2023 05:08)  T(F): 98.3 (21 Jan 2023 10:42), Max: 98.3 (21 Jan 2023 10:42)  HR: 87 (21 Jan 2023 10:42) (81 - 90)  BP: 117/67 (21 Jan 2023 10:42) (115/67 - 117/67)  BP(mean): --  RR: 18 (21 Jan 2023 10:42) (18 - 18)  SpO2: 96% (21 Jan 2023 10:42) (95% - 96%)    Parameters below as of 21 Jan 2023 10:42  Patient On (Oxygen Delivery Method): room air    01-20-23 @ 07:01  -  01-21-23 @ 07:00  --------------------------------------------------------  IN: 350 mL / OUT: 700 mL / NET: -350 mL    01-21-23 @ 07:01  -  01-21-23 @ 20:54  --------------------------------------------------------  IN: 0 mL / OUT: 350 mL / NET: -350 mL      Physical Examination:  GEN: NAD, resting quietly, A&Ox3  ABD: soft, mildly tender, nondistended, no rebound or guarding  EXTR: no LE erythema, moving all extremities      LABS:                        11.3   19.45 )-----------( 333      ( 20 Jan 2023 07:04 )             34.9       01-21    138  |  103  |  20  ----------------------------<  107<H>  4.2   |  18<L>  |  1.27    Ca    8.3<L>      21 Jan 2023 07:07    TPro  6.7  /  Alb  2.5<L>  /  TBili  0.6  /  DBili  x   /  AST  27  /  ALT  12  /  AlkPhos  176<H>  01-21

## 2023-01-21 NOTE — PROGRESS NOTE ADULT - REASON FOR ADMISSION
Sent from [facility] for RUQ abdominal pain since 1/15/23 Sent from [facility] for RUQ abdominal pain since 1/15/23 Sent from [facility] for RUQ abdominal pain since 1/15/23

## 2023-01-21 NOTE — DIETITIAN INITIAL EVALUATION ADULT - NSFNSNUTRCHEWSWALLOWFT_GEN_A_CORE
Pt's daughter reports pt consuming chopped foods at home most recently. RD to recommend change in diet texture/consistency.

## 2023-01-21 NOTE — DIETITIAN INITIAL EVALUATION ADULT - OTHER INFO
Per pt's daughter, pt's UBW: 170 pounds - reports pt last weighing UBW in November 2022  Dosing wt: 124 pounds (1/20)  Bed wt obtained by RD (1/21): 121.6 pounds   Wt hx per chart: No new weights available. No weight hx available per Sarbjit MERCEDES.   Possible ~46 pound (27%) unintentional wt loss x 3 months; RD to continue to monitor weight trends as able.   Nutritionally Pertinent Meds in-house: Abx, IVF, SSI, synthroid.   Nutritionally Pertinent Labs: elevated Glu - being addressed with insulin regimen and PO diet; A1c 6.3% (1/20) - indicates good glycemic control.   - Per Surgery (1/20): "consider IR perc cholecystostomy given the rise in leukocytosis"

## 2023-01-21 NOTE — PROGRESS NOTE ADULT - SUBJECTIVE AND OBJECTIVE BOX
CARDIOLOGY     PROGRESS  NOTE   ________________________________________________    CHIEF COMPLAINT:Patient is a 82y old  Female who presents with a chief complaint of Sent from [facility] for RUQ abdominal pain since 1/15/23 (20 Jan 2023 18:35)  no complain  	  REVIEW OF SYSTEMS:  CONSTITUTIONAL: No fever, weight loss, or fatigue  EYES: No eye pain, visual disturbances, or discharge  ENT:  No difficulty hearing, tinnitus, vertigo; No sinus or throat pain  NECK: No pain or stiffness  RESPIRATORY: No cough, wheezing, chills or hemoptysis; No Shortness of Breath  CARDIOVASCULAR: No chest pain, palpitations, passing out, dizziness, or leg swelling  GASTROINTESTINAL: decrease  abdominal or epigastric pain. No nausea, vomiting, or hematemesis; No diarrhea or constipation. No melena or hematochezia.  GENITOURINARY: No dysuria, frequency, hematuria, or incontinence  NEUROLOGICAL: No headaches, memory loss, loss of strength, numbness, or tremors  SKIN: No itching, burning, rashes, or lesions   LYMPH Nodes: No enlarged glands  ENDOCRINE: No heat or cold intolerance; No hair loss  MUSCULOSKELETAL: No joint pain or swelling; No muscle, back, or extremity pain  PSYCHIATRIC: No depression, anxiety, mood swings, or difficulty sleeping  HEME/LYMPH: No easy bruising, or bleeding gums  ALLERGY AND IMMUNOLOGIC: No hives or eczema	    [x ] All others negative	  [ ] Unable to obtain    PHYSICAL EXAM:  T(C): 36.8 (01-21-23 @ 05:08), Max: 36.8 (01-21-23 @ 05:08)  HR: 90 (01-21-23 @ 05:08) (81 - 90)  BP: 117/66 (01-21-23 @ 05:08) (112/69 - 117/66)  RR: 18 (01-21-23 @ 05:08) (18 - 18)  SpO2: 95% (01-21-23 @ 05:08) (95% - 96%)  Wt(kg): --  I&O's Summary    20 Jan 2023 07:01  -  21 Jan 2023 07:00  --------------------------------------------------------  IN: 350 mL / OUT: 700 mL / NET: -350 mL        Appearance: Normal	  HEENT:   Normal oral mucosa, PERRL, EOMI	  Lymphatic: No lymphadenopathy  Cardiovascular: Normal S1 S2, No JVD, + murmurs, No edema  Respiratory: rhonchi  Psychiatry: A & O x 3, Mood & affect appropriate  Gastrointestinal:  Soft, + RUQ tender, + BS	  Skin: No rashes, No ecchymoses, No cyanosis	  Neurologic: Non-focal  Extremities: Normal range of motion, No clubbing, cyanosis or edema  Vascular: Peripheral pulses palpable 2+ bilaterally    MEDICATIONS  (STANDING):  aspirin enteric coated 81 milliGRAM(s) Oral daily  chlorhexidine 2% Cloths 1 Application(s) Topical <User Schedule>  dextrose 5%. 1000 milliLiter(s) (100 mL/Hr) IV Continuous <Continuous>  dextrose 5%. 1000 milliLiter(s) (50 mL/Hr) IV Continuous <Continuous>  dextrose 50% Injectable 25 Gram(s) IV Push once  dextrose 50% Injectable 12.5 Gram(s) IV Push once  dextrose 50% Injectable 25 Gram(s) IV Push once  glucagon  Injectable 1 milliGRAM(s) IntraMuscular once  heparin   Injectable 5000 Unit(s) SubCutaneous <User Schedule>  insulin lispro (ADMELOG) corrective regimen sliding scale   SubCutaneous three times a day before meals  insulin lispro (ADMELOG) corrective regimen sliding scale   SubCutaneous at bedtime  levothyroxine Injectable 25 MICROGram(s) IV Push at bedtime  piperacillin/tazobactam IVPB.. 3.375 Gram(s) IV Intermittent every 8 hours  sodium chloride 0.9%. 1000 milliLiter(s) (70 mL/Hr) IV Continuous <Continuous>      TELEMETRY: 	    ECG:  	  RADIOLOGY:  OTHER: 	  	  LABS:	 	    CARDIAC MARKERS:                                11.3   19.45 )-----------( 333      ( 20 Jan 2023 07:04 )             34.9     01-21    138  |  103  |  20  ----------------------------<  107<H>  4.2   |  18<L>  |  1.27    Ca    8.3<L>      21 Jan 2023 07:07    TPro  6.7  /  Alb  2.5<L>  /  TBili  0.6  /  DBili  x   /  AST  27  /  ALT  12  /  AlkPhos  176<H>  01-21    proBNP:   Lipid Profile: Cholesterol --  LDL --  HDL --      HgA1c:   TSH: Thyroid Stimulating Hormone, Serum: 4.62 uIU/mL (01-19 @ 07:45)    < from: 12 Lead ECG (01.18.23 @ 17:06) >  Diagnosis Line NORMAL SINUS RHYTHM  NONSPECIFIC ST AND T WAVE ABNORMALITY        Assessment and plan  ---------------------------   81 y/o F--history from patient not reliable and obtained from patient's adult daughter above by me--patient with a history of an apparently complicated course of hospitalization at [facility]  in Nov 2022 with apparently severe septic shock requiring vasopressors from a complicated pyelonephritis RIGHT with hypotension and respiratory failure S/P intubation /extubation, COVID-19 + at the time (patient COVID-19 vaccinated x 3), with apparent MI at the time and 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  Daughter reports that the patient was discharged to [facility] with patient with impaired functional status following hospitalization, along with development of cognitive impairment.  Unclear if patient had a perinephric abscess at the time with drainage (?).   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 Wale Huffman at [facility] in Mar 2016 (I reviewed H[facility] Office notes).    Patient now sent from the SNF following apparently 4 days of  RUQ and RIGHT flank pain with poor PO, nausea.  No chest pain/pressure.  NO fever, no chills, no rigors.  No red blood per rectum or melena.  No diaphoresis.   pt with hx of htn, ashd, cva, pt had stent in 2020, pt apparently had increase trop and was started empirically on Brilanta?? since november  if pt needs surgery or intervention may hold Brilanta, will discuss with her cardiologist Dr blum   will adjust cardiac meds  continue asa 81 mg daily  continue abx  beta blocker as tolerated  spoke to her daughter   GI mike  awaiting blood work from today  will plan for further cardiac michael    	                    CARDIOLOGY     PROGRESS  NOTE   ________________________________________________    CHIEF COMPLAINT:Patient is a 82y old  Female who presents with a chief complaint of Sent from [facility] for RUQ abdominal pain since 1/15/23 (20 Jan 2023 18:35)  no complain  	  REVIEW OF SYSTEMS:  CONSTITUTIONAL: No fever, weight loss, or fatigue  EYES: No eye pain, visual disturbances, or discharge  ENT:  No difficulty hearing, tinnitus, vertigo; No sinus or throat pain  NECK: No pain or stiffness  RESPIRATORY: No cough, wheezing, chills or hemoptysis; No Shortness of Breath  CARDIOVASCULAR: No chest pain, palpitations, passing out, dizziness, or leg swelling  GASTROINTESTINAL: decrease  abdominal or epigastric pain. No nausea, vomiting, or hematemesis; No diarrhea or constipation. No melena or hematochezia.  GENITOURINARY: No dysuria, frequency, hematuria, or incontinence  NEUROLOGICAL: No headaches, memory loss, loss of strength, numbness, or tremors  SKIN: No itching, burning, rashes, or lesions   LYMPH Nodes: No enlarged glands  ENDOCRINE: No heat or cold intolerance; No hair loss  MUSCULOSKELETAL: No joint pain or swelling; No muscle, back, or extremity pain  PSYCHIATRIC: No depression, anxiety, mood swings, or difficulty sleeping  HEME/LYMPH: No easy bruising, or bleeding gums  ALLERGY AND IMMUNOLOGIC: No hives or eczema	    [x ] All others negative	  [ ] Unable to obtain    PHYSICAL EXAM:  T(C): 36.8 (01-21-23 @ 05:08), Max: 36.8 (01-21-23 @ 05:08)  HR: 90 (01-21-23 @ 05:08) (81 - 90)  BP: 117/66 (01-21-23 @ 05:08) (112/69 - 117/66)  RR: 18 (01-21-23 @ 05:08) (18 - 18)  SpO2: 95% (01-21-23 @ 05:08) (95% - 96%)  Wt(kg): --  I&O's Summary    20 Jan 2023 07:01  -  21 Jan 2023 07:00  --------------------------------------------------------  IN: 350 mL / OUT: 700 mL / NET: -350 mL        Appearance: Normal	  HEENT:   Normal oral mucosa, PERRL, EOMI	  Lymphatic: No lymphadenopathy  Cardiovascular: Normal S1 S2, No JVD, + murmurs, No edema  Respiratory: rhonchi  Psychiatry: A & O x 3, Mood & affect appropriate  Gastrointestinal:  Soft, + RUQ tender, + BS	  Skin: No rashes, No ecchymoses, No cyanosis	  Neurologic: Non-focal  Extremities: Normal range of motion, No clubbing, cyanosis or edema  Vascular: Peripheral pulses palpable 2+ bilaterally    MEDICATIONS  (STANDING):  aspirin enteric coated 81 milliGRAM(s) Oral daily  chlorhexidine 2% Cloths 1 Application(s) Topical <User Schedule>  dextrose 5%. 1000 milliLiter(s) (100 mL/Hr) IV Continuous <Continuous>  dextrose 5%. 1000 milliLiter(s) (50 mL/Hr) IV Continuous <Continuous>  dextrose 50% Injectable 25 Gram(s) IV Push once  dextrose 50% Injectable 12.5 Gram(s) IV Push once  dextrose 50% Injectable 25 Gram(s) IV Push once  glucagon  Injectable 1 milliGRAM(s) IntraMuscular once  heparin   Injectable 5000 Unit(s) SubCutaneous <User Schedule>  insulin lispro (ADMELOG) corrective regimen sliding scale   SubCutaneous three times a day before meals  insulin lispro (ADMELOG) corrective regimen sliding scale   SubCutaneous at bedtime  levothyroxine Injectable 25 MICROGram(s) IV Push at bedtime  piperacillin/tazobactam IVPB.. 3.375 Gram(s) IV Intermittent every 8 hours  sodium chloride 0.9%. 1000 milliLiter(s) (70 mL/Hr) IV Continuous <Continuous>      TELEMETRY: 	    ECG:  	  RADIOLOGY:  OTHER: 	  	  LABS:	 	    CARDIAC MARKERS:                                11.3   19.45 )-----------( 333      ( 20 Jan 2023 07:04 )             34.9     01-21    138  |  103  |  20  ----------------------------<  107<H>  4.2   |  18<L>  |  1.27    Ca    8.3<L>      21 Jan 2023 07:07    TPro  6.7  /  Alb  2.5<L>  /  TBili  0.6  /  DBili  x   /  AST  27  /  ALT  12  /  AlkPhos  176<H>  01-21    proBNP:   Lipid Profile: Cholesterol --  LDL --  HDL --      HgA1c:   TSH: Thyroid Stimulating Hormone, Serum: 4.62 uIU/mL (01-19 @ 07:45)    < from: 12 Lead ECG (01.18.23 @ 17:06) >  Diagnosis Line NORMAL SINUS RHYTHM  NONSPECIFIC ST AND T WAVE ABNORMALITY        Assessment and plan  ---------------------------   83 y/o F--history from patient not reliable and obtained from patient's adult daughter above by me--patient with a history of an apparently complicated course of hospitalization at [facility]  in Nov 2022 with apparently severe septic shock requiring vasopressors from a complicated pyelonephritis RIGHT with hypotension and respiratory failure S/P intubation /extubation, COVID-19 + at the time (patient COVID-19 vaccinated x 3), with apparent MI at the time and 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  Daughter reports that the patient was discharged to [facility] with patient with impaired functional status following hospitalization, along with development of cognitive impairment.  Unclear if patient had a perinephric abscess at the time with drainage (?).   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 Wale Huffman at [facility] in Mar 2016 (I reviewed [facility] Office notes).    Patient now sent from the SNF following apparently 4 days of  RUQ and RIGHT flank pain with poor PO, nausea.  No chest pain/pressure.  NO fever, no chills, no rigors.  No red blood per rectum or melena.  No diaphoresis.   pt with hx of htn, ashd, cva, pt had stent in 2020, pt apparently had increase trop and was started empirically on Brilanta?? since november  if pt needs surgery or intervention may hold Brilanta, will discuss with her cardiologist Dr blum   will adjust cardiac meds  continue asa 81 mg daily  continue abx  beta blocker as tolerated  spoke to her daughter   GI mike  awaiting blood work from today  will plan for further cardiac michael    	                    CARDIOLOGY     PROGRESS  NOTE   ________________________________________________    CHIEF COMPLAINT:Patient is a 82y old  Female who presents with a chief complaint of Sent from [facility] for RUQ abdominal pain since 1/15/23 (20 Jan 2023 18:35)  no complain  	  REVIEW OF SYSTEMS:  CONSTITUTIONAL: No fever, weight loss, or fatigue  EYES: No eye pain, visual disturbances, or discharge  ENT:  No difficulty hearing, tinnitus, vertigo; No sinus or throat pain  NECK: No pain or stiffness  RESPIRATORY: No cough, wheezing, chills or hemoptysis; No Shortness of Breath  CARDIOVASCULAR: No chest pain, palpitations, passing out, dizziness, or leg swelling  GASTROINTESTINAL: decrease  abdominal or epigastric pain. No nausea, vomiting, or hematemesis; No diarrhea or constipation. No melena or hematochezia.  GENITOURINARY: No dysuria, frequency, hematuria, or incontinence  NEUROLOGICAL: No headaches, memory loss, loss of strength, numbness, or tremors  SKIN: No itching, burning, rashes, or lesions   LYMPH Nodes: No enlarged glands  ENDOCRINE: No heat or cold intolerance; No hair loss  MUSCULOSKELETAL: No joint pain or swelling; No muscle, back, or extremity pain  PSYCHIATRIC: No depression, anxiety, mood swings, or difficulty sleeping  HEME/LYMPH: No easy bruising, or bleeding gums  ALLERGY AND IMMUNOLOGIC: No hives or eczema	    [x ] All others negative	  [ ] Unable to obtain    PHYSICAL EXAM:  T(C): 36.8 (01-21-23 @ 05:08), Max: 36.8 (01-21-23 @ 05:08)  HR: 90 (01-21-23 @ 05:08) (81 - 90)  BP: 117/66 (01-21-23 @ 05:08) (112/69 - 117/66)  RR: 18 (01-21-23 @ 05:08) (18 - 18)  SpO2: 95% (01-21-23 @ 05:08) (95% - 96%)  Wt(kg): --  I&O's Summary    20 Jan 2023 07:01  -  21 Jan 2023 07:00  --------------------------------------------------------  IN: 350 mL / OUT: 700 mL / NET: -350 mL        Appearance: Normal	  HEENT:   Normal oral mucosa, PERRL, EOMI	  Lymphatic: No lymphadenopathy  Cardiovascular: Normal S1 S2, No JVD, + murmurs, No edema  Respiratory: rhonchi  Psychiatry: A & O x 3, Mood & affect appropriate  Gastrointestinal:  Soft, + RUQ tender, + BS	  Skin: No rashes, No ecchymoses, No cyanosis	  Neurologic: Non-focal  Extremities: Normal range of motion, No clubbing, cyanosis or edema  Vascular: Peripheral pulses palpable 2+ bilaterally    MEDICATIONS  (STANDING):  aspirin enteric coated 81 milliGRAM(s) Oral daily  chlorhexidine 2% Cloths 1 Application(s) Topical <User Schedule>  dextrose 5%. 1000 milliLiter(s) (100 mL/Hr) IV Continuous <Continuous>  dextrose 5%. 1000 milliLiter(s) (50 mL/Hr) IV Continuous <Continuous>  dextrose 50% Injectable 25 Gram(s) IV Push once  dextrose 50% Injectable 12.5 Gram(s) IV Push once  dextrose 50% Injectable 25 Gram(s) IV Push once  glucagon  Injectable 1 milliGRAM(s) IntraMuscular once  heparin   Injectable 5000 Unit(s) SubCutaneous <User Schedule>  insulin lispro (ADMELOG) corrective regimen sliding scale   SubCutaneous three times a day before meals  insulin lispro (ADMELOG) corrective regimen sliding scale   SubCutaneous at bedtime  levothyroxine Injectable 25 MICROGram(s) IV Push at bedtime  piperacillin/tazobactam IVPB.. 3.375 Gram(s) IV Intermittent every 8 hours  sodium chloride 0.9%. 1000 milliLiter(s) (70 mL/Hr) IV Continuous <Continuous>      TELEMETRY: 	    ECG:  	  RADIOLOGY:  OTHER: 	  	  LABS:	 	    CARDIAC MARKERS:                                11.3   19.45 )-----------( 333      ( 20 Jan 2023 07:04 )             34.9     01-21    138  |  103  |  20  ----------------------------<  107<H>  4.2   |  18<L>  |  1.27    Ca    8.3<L>      21 Jan 2023 07:07    TPro  6.7  /  Alb  2.5<L>  /  TBili  0.6  /  DBili  x   /  AST  27  /  ALT  12  /  AlkPhos  176<H>  01-21    proBNP:   Lipid Profile: Cholesterol --  LDL --  HDL --      HgA1c:   TSH: Thyroid Stimulating Hormone, Serum: 4.62 uIU/mL (01-19 @ 07:45)    < from: 12 Lead ECG (01.18.23 @ 17:06) >  Diagnosis Line NORMAL SINUS RHYTHM  NONSPECIFIC ST AND T WAVE ABNORMALITY        Assessment and plan  ---------------------------   81 y/o F--history from patient not reliable and obtained from patient's adult daughter above by me--patient with a history of an apparently complicated course of hospitalization at [facility]  in Nov 2022 with apparently severe septic shock requiring vasopressors from a complicated pyelonephritis RIGHT with hypotension and respiratory failure S/P intubation /extubation, COVID-19 + at the time (patient COVID-19 vaccinated x 3), with apparent MI at the time and 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  Daughter reports that the patient was discharged to [facility] with patient with impaired functional status following hospitalization, along with development of cognitive impairment.  Unclear if patient had a perinephric abscess at the time with drainage (?).   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 Wale Huffman at [facility] in Mar 2016 (I reviewed [facility] Office notes).    Patient now sent from the SNF following apparently 4 days of  RUQ and RIGHT flank pain with poor PO, nausea.  No chest pain/pressure.  NO fever, no chills, no rigors.  No red blood per rectum or melena.  No diaphoresis.   pt with hx of htn, ashd, cva, pt had stent in 2020, pt apparently had increase trop and was started empirically on Brilanta?? since november  if pt needs surgery or intervention may hold Brilanta, will discuss with her cardiologist Dr blum   will adjust cardiac meds  continue asa 81 mg daily  continue abx  beta blocker as tolerated  spoke to her daughter   GI mike  awaiting blood work from today  will plan for further cardiac michael

## 2023-01-22 LAB
ALBUMIN SERPL ELPH-MCNC: 2.5 G/DL — LOW (ref 3.3–5)
ALP SERPL-CCNC: 162 U/L — HIGH (ref 40–120)
ALT FLD-CCNC: 10 U/L — SIGNIFICANT CHANGE UP (ref 10–45)
ANION GAP SERPL CALC-SCNC: 18 MMOL/L — HIGH (ref 5–17)
AST SERPL-CCNC: 22 U/L — SIGNIFICANT CHANGE UP (ref 10–40)
BILIRUB SERPL-MCNC: 0.6 MG/DL — SIGNIFICANT CHANGE UP (ref 0.2–1.2)
BUN SERPL-MCNC: 16 MG/DL — SIGNIFICANT CHANGE UP (ref 7–23)
CALCIUM SERPL-MCNC: 8.6 MG/DL — SIGNIFICANT CHANGE UP (ref 8.4–10.5)
CHLORIDE SERPL-SCNC: 102 MMOL/L — SIGNIFICANT CHANGE UP (ref 96–108)
CO2 SERPL-SCNC: 18 MMOL/L — LOW (ref 22–31)
CREAT SERPL-MCNC: 1.13 MG/DL — SIGNIFICANT CHANGE UP (ref 0.5–1.3)
EGFR: 48 ML/MIN/1.73M2 — LOW
GLUCOSE BLDC GLUCOMTR-MCNC: 112 MG/DL — HIGH (ref 70–99)
GLUCOSE BLDC GLUCOMTR-MCNC: 125 MG/DL — HIGH (ref 70–99)
GLUCOSE BLDC GLUCOMTR-MCNC: 146 MG/DL — HIGH (ref 70–99)
GLUCOSE BLDC GLUCOMTR-MCNC: 148 MG/DL — HIGH (ref 70–99)
GLUCOSE SERPL-MCNC: 107 MG/DL — HIGH (ref 70–99)
HCT VFR BLD CALC: 35.5 % — SIGNIFICANT CHANGE UP (ref 34.5–45)
HGB BLD-MCNC: 11.2 G/DL — LOW (ref 11.5–15.5)
MCHC RBC-ENTMCNC: 29.7 PG — SIGNIFICANT CHANGE UP (ref 27–34)
MCHC RBC-ENTMCNC: 31.5 GM/DL — LOW (ref 32–36)
MCV RBC AUTO: 94.2 FL — SIGNIFICANT CHANGE UP (ref 80–100)
NRBC # BLD: 0 /100 WBCS — SIGNIFICANT CHANGE UP (ref 0–0)
PLATELET # BLD AUTO: 327 K/UL — SIGNIFICANT CHANGE UP (ref 150–400)
POTASSIUM SERPL-MCNC: 3.9 MMOL/L — SIGNIFICANT CHANGE UP (ref 3.5–5.3)
POTASSIUM SERPL-SCNC: 3.9 MMOL/L — SIGNIFICANT CHANGE UP (ref 3.5–5.3)
PROT SERPL-MCNC: 6.5 G/DL — SIGNIFICANT CHANGE UP (ref 6–8.3)
RBC # BLD: 3.77 M/UL — LOW (ref 3.8–5.2)
RBC # FLD: 15.7 % — HIGH (ref 10.3–14.5)
SODIUM SERPL-SCNC: 138 MMOL/L — SIGNIFICANT CHANGE UP (ref 135–145)
WBC # BLD: 11.37 K/UL — HIGH (ref 3.8–10.5)
WBC # FLD AUTO: 11.37 K/UL — HIGH (ref 3.8–10.5)

## 2023-01-22 RX ADMIN — CHLORHEXIDINE GLUCONATE 1 APPLICATION(S): 213 SOLUTION TOPICAL at 05:32

## 2023-01-22 RX ADMIN — PIPERACILLIN AND TAZOBACTAM 25 GRAM(S): 4; .5 INJECTION, POWDER, LYOPHILIZED, FOR SOLUTION INTRAVENOUS at 22:15

## 2023-01-22 RX ADMIN — Medication 400 MILLIGRAM(S): at 00:02

## 2023-01-22 RX ADMIN — HEPARIN SODIUM 5000 UNIT(S): 5000 INJECTION INTRAVENOUS; SUBCUTANEOUS at 00:02

## 2023-01-22 RX ADMIN — HEPARIN SODIUM 5000 UNIT(S): 5000 INJECTION INTRAVENOUS; SUBCUTANEOUS at 11:37

## 2023-01-22 RX ADMIN — PIPERACILLIN AND TAZOBACTAM 25 GRAM(S): 4; .5 INJECTION, POWDER, LYOPHILIZED, FOR SOLUTION INTRAVENOUS at 13:32

## 2023-01-22 RX ADMIN — Medication 81 MILLIGRAM(S): at 11:37

## 2023-01-22 RX ADMIN — PIPERACILLIN AND TAZOBACTAM 25 GRAM(S): 4; .5 INJECTION, POWDER, LYOPHILIZED, FOR SOLUTION INTRAVENOUS at 05:32

## 2023-01-22 RX ADMIN — Medication 1000 MILLIGRAM(S): at 00:17

## 2023-01-22 NOTE — PROGRESS NOTE ADULT - SUBJECTIVE AND OBJECTIVE BOX
CARDIOLOGY     PROGRESS  NOTE   ________________________________________________    CHIEF COMPLAINT:Patient is a 82y old  Female who presents with a chief complaint of Sent from [facility] for RUQ abdominal pain since 1/15/23 (21 Jan 2023 20:54)  no complain  	  REVIEW OF SYSTEMS:  CONSTITUTIONAL: No fever, weight loss, or fatigue  EYES: No eye pain, visual disturbances, or discharge  ENT:  No difficulty hearing, tinnitus, vertigo; No sinus or throat pain  NECK: No pain or stiffness  RESPIRATORY: No cough, wheezing, chills or hemoptysis; No Shortness of Breath  CARDIOVASCULAR: No chest pain, palpitations, passing out, dizziness, or leg swelling  GASTROINTESTINAL: No abdominal or epigastric pain. No nausea, vomiting, or hematemesis; No diarrhea or constipation. No melena or hematochezia.  GENITOURINARY: No dysuria, frequency, hematuria, or incontinence  NEUROLOGICAL: No headaches, memory loss, loss of strength, numbness, or tremors  SKIN: No itching, burning, rashes, or lesions   LYMPH Nodes: No enlarged glands  ENDOCRINE: No heat or cold intolerance; No hair loss  MUSCULOSKELETAL: No joint pain or swelling; No muscle, back, or extremity pain  PSYCHIATRIC: No depression, anxiety, mood swings, or difficulty sleeping  HEME/LYMPH: No easy bruising, or bleeding gums  ALLERGY AND IMMUNOLOGIC: No hives or eczema	    [ x] All others negative	  [ ] Unable to obtain    PHYSICAL EXAM:  T(C): 36.9 (01-22-23 @ 04:03), Max: 37.1 (01-21-23 @ 21:31)  HR: 94 (01-22-23 @ 04:03) (84 - 94)  BP: 127/75 (01-22-23 @ 04:03) (117/67 - 127/75)  RR: 18 (01-22-23 @ 04:03) (18 - 18)  SpO2: 94% (01-22-23 @ 04:03) (94% - 96%)  Wt(kg): --  I&O's Summary    21 Jan 2023 07:01  -  22 Jan 2023 07:00  --------------------------------------------------------  IN: 100 mL / OUT: 350 mL / NET: -250 mL        Appearance: Normal	  HEENT:   Normal oral mucosa, PERRL, EOMI	  Lymphatic: No lymphadenopathy  Cardiovascular: Normal S1 S2, No JVD, + murmurs, No edema  Respiratory: rhonchi  Psychiatry: A & O x 3, Mood & affect appropriate  Gastrointestinal:  Soft, + RUQ tender, + BS	  Skin: No rashes, No ecchymoses, No cyanosis	  Neurologic: Non-focal  Extremities: Normal range of motion, No clubbing, cyanosis or edema  Vascular: Peripheral pulses palpable 2+ bilaterally    MEDICATIONS  (STANDING):  aspirin enteric coated 81 milliGRAM(s) Oral daily  chlorhexidine 2% Cloths 1 Application(s) Topical <User Schedule>  dextrose 5%. 1000 milliLiter(s) (100 mL/Hr) IV Continuous <Continuous>  dextrose 5%. 1000 milliLiter(s) (50 mL/Hr) IV Continuous <Continuous>  dextrose 50% Injectable 25 Gram(s) IV Push once  dextrose 50% Injectable 12.5 Gram(s) IV Push once  dextrose 50% Injectable 25 Gram(s) IV Push once  glucagon  Injectable 1 milliGRAM(s) IntraMuscular once  heparin   Injectable 5000 Unit(s) SubCutaneous <User Schedule>  insulin lispro (ADMELOG) corrective regimen sliding scale   SubCutaneous three times a day before meals  insulin lispro (ADMELOG) corrective regimen sliding scale   SubCutaneous at bedtime  levothyroxine Injectable 25 MICROGram(s) IV Push at bedtime  piperacillin/tazobactam IVPB.. 3.375 Gram(s) IV Intermittent every 8 hours  sodium chloride 0.9%. 1000 milliLiter(s) (70 mL/Hr) IV Continuous <Continuous>      TELEMETRY: 	    ECG:  	  RADIOLOGY:  OTHER: 	  	  LABS:	 	    CARDIAC MARKERS:                                11.2   11.37 )-----------( 327      ( 22 Jan 2023 07:14 )             35.5     01-22    138  |  102  |  16  ----------------------------<  107<H>  3.9   |  18<L>  |  1.13    Ca    8.6      22 Jan 2023 07:14    TPro  6.5  /  Alb  2.5<L>  /  TBili  0.6  /  DBili  x   /  AST  22  /  ALT  10  /  AlkPhos  162<H>  01-22    proBNP:   Lipid Profile: Cholesterol --  LDL --  HDL --      HgA1c:   TSH: Thyroid Stimulating Hormone, Serum: 4.62 uIU/mL (01-19 @ 07:45)          Assessment and plan  ---------------------------   83 y/o F--history from patient not reliable and obtained from patient's adult daughter above by me--patient with a history of an apparently complicated course of hospitalization at [facility]  in Nov 2022 with apparently severe septic shock requiring vasopressors from a complicated pyelonephritis RIGHT with hypotension and respiratory failure S/P intubation /extubation, COVID-19 + at the time (patient COVID-19 vaccinated x 3), with apparent MI at the time and 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  Daughter reports that the patient was discharged to [facility] with patient with impaired functional status following hospitalization, along with development of cognitive impairment.  Unclear if patient had a perinephric abscess at the time with drainage (?).   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 Wale Huffman at [facility] in Mar 2016 (I reviewed [facility] Office notes).    Patient now sent from the SNF following apparently 4 days of  RUQ and RIGHT flank pain with poor PO, nausea.  No chest pain/pressure.  NO fever, no chills, no rigors.  No red blood per rectum or melena.  No diaphoresis.   pt with hx of htn, ashd, cva, pt had stent in 2020, pt apparently had increase trop and was started empirically on Brilanta?? since november  if pt needs surgery or intervention may hold Brilanta, will discuss with her cardiologist Dr blum   will adjust cardiac meds  continue asa 81 mg daily  continue abx  beta blocker as tolerated  spoke to her daughter   GI eval  will plan for further cardiac michael  discussed with family last stent 3 years ago, pt was started on Brilanta on the last admission with no cardiac michael sec to increase trop  awaiting echo  will consider ischemia michael prior to dc      	                    CARDIOLOGY     PROGRESS  NOTE   ________________________________________________    CHIEF COMPLAINT:Patient is a 82y old  Female who presents with a chief complaint of Sent from [facility] for RUQ abdominal pain since 1/15/23 (21 Jan 2023 20:54)  no complain  	  REVIEW OF SYSTEMS:  CONSTITUTIONAL: No fever, weight loss, or fatigue  EYES: No eye pain, visual disturbances, or discharge  ENT:  No difficulty hearing, tinnitus, vertigo; No sinus or throat pain  NECK: No pain or stiffness  RESPIRATORY: No cough, wheezing, chills or hemoptysis; No Shortness of Breath  CARDIOVASCULAR: No chest pain, palpitations, passing out, dizziness, or leg swelling  GASTROINTESTINAL: No abdominal or epigastric pain. No nausea, vomiting, or hematemesis; No diarrhea or constipation. No melena or hematochezia.  GENITOURINARY: No dysuria, frequency, hematuria, or incontinence  NEUROLOGICAL: No headaches, memory loss, loss of strength, numbness, or tremors  SKIN: No itching, burning, rashes, or lesions   LYMPH Nodes: No enlarged glands  ENDOCRINE: No heat or cold intolerance; No hair loss  MUSCULOSKELETAL: No joint pain or swelling; No muscle, back, or extremity pain  PSYCHIATRIC: No depression, anxiety, mood swings, or difficulty sleeping  HEME/LYMPH: No easy bruising, or bleeding gums  ALLERGY AND IMMUNOLOGIC: No hives or eczema	    [ x] All others negative	  [ ] Unable to obtain    PHYSICAL EXAM:  T(C): 36.9 (01-22-23 @ 04:03), Max: 37.1 (01-21-23 @ 21:31)  HR: 94 (01-22-23 @ 04:03) (84 - 94)  BP: 127/75 (01-22-23 @ 04:03) (117/67 - 127/75)  RR: 18 (01-22-23 @ 04:03) (18 - 18)  SpO2: 94% (01-22-23 @ 04:03) (94% - 96%)  Wt(kg): --  I&O's Summary    21 Jan 2023 07:01  -  22 Jan 2023 07:00  --------------------------------------------------------  IN: 100 mL / OUT: 350 mL / NET: -250 mL        Appearance: Normal	  HEENT:   Normal oral mucosa, PERRL, EOMI	  Lymphatic: No lymphadenopathy  Cardiovascular: Normal S1 S2, No JVD, + murmurs, No edema  Respiratory: rhonchi  Psychiatry: A & O x 3, Mood & affect appropriate  Gastrointestinal:  Soft, + RUQ tender, + BS	  Skin: No rashes, No ecchymoses, No cyanosis	  Neurologic: Non-focal  Extremities: Normal range of motion, No clubbing, cyanosis or edema  Vascular: Peripheral pulses palpable 2+ bilaterally    MEDICATIONS  (STANDING):  aspirin enteric coated 81 milliGRAM(s) Oral daily  chlorhexidine 2% Cloths 1 Application(s) Topical <User Schedule>  dextrose 5%. 1000 milliLiter(s) (100 mL/Hr) IV Continuous <Continuous>  dextrose 5%. 1000 milliLiter(s) (50 mL/Hr) IV Continuous <Continuous>  dextrose 50% Injectable 25 Gram(s) IV Push once  dextrose 50% Injectable 12.5 Gram(s) IV Push once  dextrose 50% Injectable 25 Gram(s) IV Push once  glucagon  Injectable 1 milliGRAM(s) IntraMuscular once  heparin   Injectable 5000 Unit(s) SubCutaneous <User Schedule>  insulin lispro (ADMELOG) corrective regimen sliding scale   SubCutaneous three times a day before meals  insulin lispro (ADMELOG) corrective regimen sliding scale   SubCutaneous at bedtime  levothyroxine Injectable 25 MICROGram(s) IV Push at bedtime  piperacillin/tazobactam IVPB.. 3.375 Gram(s) IV Intermittent every 8 hours  sodium chloride 0.9%. 1000 milliLiter(s) (70 mL/Hr) IV Continuous <Continuous>      TELEMETRY: 	    ECG:  	  RADIOLOGY:  OTHER: 	  	  LABS:	 	    CARDIAC MARKERS:                                11.2   11.37 )-----------( 327      ( 22 Jan 2023 07:14 )             35.5     01-22    138  |  102  |  16  ----------------------------<  107<H>  3.9   |  18<L>  |  1.13    Ca    8.6      22 Jan 2023 07:14    TPro  6.5  /  Alb  2.5<L>  /  TBili  0.6  /  DBili  x   /  AST  22  /  ALT  10  /  AlkPhos  162<H>  01-22    proBNP:   Lipid Profile: Cholesterol --  LDL --  HDL --      HgA1c:   TSH: Thyroid Stimulating Hormone, Serum: 4.62 uIU/mL (01-19 @ 07:45)          Assessment and plan  ---------------------------   83 y/o F--history from patient not reliable and obtained from patient's adult daughter above by me--patient with a history of an apparently complicated course of hospitalization at [facility]  in Nov 2022 with apparently severe septic shock requiring vasopressors from a complicated pyelonephritis RIGHT with hypotension and respiratory failure S/P intubation /extubation, COVID-19 + at the time (patient COVID-19 vaccinated x 3), with apparent MI at the time and 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  Daughter reports that the patient was discharged to [facility] with patient with impaired functional status following hospitalization, along with development of cognitive impairment.  Unclear if patient had a perinephric abscess at the time with drainage (?).   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 Wale Huffman at [facility] in Mar 2016 (I reviewed [facility] Office notes).    Patient now sent from the SNF following apparently 4 days of  RUQ and RIGHT flank pain with poor PO, nausea.  No chest pain/pressure.  NO fever, no chills, no rigors.  No red blood per rectum or melena.  No diaphoresis.   pt with hx of htn, ashd, cva, pt had stent in 2020, pt apparently had increase trop and was started empirically on Brilanta?? since november  if pt needs surgery or intervention may hold Brilanta, will discuss with her cardiologist Dr blum   will adjust cardiac meds  continue asa 81 mg daily  continue abx  beta blocker as tolerated  spoke to her daughter   GI eval  will plan for further cardiac michael  discussed with family last stent 3 years ago, pt was started on Brilanta on the last admission with no cardiac michael sec to increase trop  awaiting echo  will consider ischemia michael prior to dc      	                    CARDIOLOGY     PROGRESS  NOTE   ________________________________________________    CHIEF COMPLAINT:Patient is a 82y old  Female who presents with a chief complaint of Sent from [facility] for RUQ abdominal pain since 1/15/23 (21 Jan 2023 20:54)  no complain  	  REVIEW OF SYSTEMS:  CONSTITUTIONAL: No fever, weight loss, or fatigue  EYES: No eye pain, visual disturbances, or discharge  ENT:  No difficulty hearing, tinnitus, vertigo; No sinus or throat pain  NECK: No pain or stiffness  RESPIRATORY: No cough, wheezing, chills or hemoptysis; No Shortness of Breath  CARDIOVASCULAR: No chest pain, palpitations, passing out, dizziness, or leg swelling  GASTROINTESTINAL: No abdominal or epigastric pain. No nausea, vomiting, or hematemesis; No diarrhea or constipation. No melena or hematochezia.  GENITOURINARY: No dysuria, frequency, hematuria, or incontinence  NEUROLOGICAL: No headaches, memory loss, loss of strength, numbness, or tremors  SKIN: No itching, burning, rashes, or lesions   LYMPH Nodes: No enlarged glands  ENDOCRINE: No heat or cold intolerance; No hair loss  MUSCULOSKELETAL: No joint pain or swelling; No muscle, back, or extremity pain  PSYCHIATRIC: No depression, anxiety, mood swings, or difficulty sleeping  HEME/LYMPH: No easy bruising, or bleeding gums  ALLERGY AND IMMUNOLOGIC: No hives or eczema	    [ x] All others negative	  [ ] Unable to obtain    PHYSICAL EXAM:  T(C): 36.9 (01-22-23 @ 04:03), Max: 37.1 (01-21-23 @ 21:31)  HR: 94 (01-22-23 @ 04:03) (84 - 94)  BP: 127/75 (01-22-23 @ 04:03) (117/67 - 127/75)  RR: 18 (01-22-23 @ 04:03) (18 - 18)  SpO2: 94% (01-22-23 @ 04:03) (94% - 96%)  Wt(kg): --  I&O's Summary    21 Jan 2023 07:01  -  22 Jan 2023 07:00  --------------------------------------------------------  IN: 100 mL / OUT: 350 mL / NET: -250 mL        Appearance: Normal	  HEENT:   Normal oral mucosa, PERRL, EOMI	  Lymphatic: No lymphadenopathy  Cardiovascular: Normal S1 S2, No JVD, + murmurs, No edema  Respiratory: rhonchi  Psychiatry: A & O x 3, Mood & affect appropriate  Gastrointestinal:  Soft, + RUQ tender, + BS	  Skin: No rashes, No ecchymoses, No cyanosis	  Neurologic: Non-focal  Extremities: Normal range of motion, No clubbing, cyanosis or edema  Vascular: Peripheral pulses palpable 2+ bilaterally    MEDICATIONS  (STANDING):  aspirin enteric coated 81 milliGRAM(s) Oral daily  chlorhexidine 2% Cloths 1 Application(s) Topical <User Schedule>  dextrose 5%. 1000 milliLiter(s) (100 mL/Hr) IV Continuous <Continuous>  dextrose 5%. 1000 milliLiter(s) (50 mL/Hr) IV Continuous <Continuous>  dextrose 50% Injectable 25 Gram(s) IV Push once  dextrose 50% Injectable 12.5 Gram(s) IV Push once  dextrose 50% Injectable 25 Gram(s) IV Push once  glucagon  Injectable 1 milliGRAM(s) IntraMuscular once  heparin   Injectable 5000 Unit(s) SubCutaneous <User Schedule>  insulin lispro (ADMELOG) corrective regimen sliding scale   SubCutaneous three times a day before meals  insulin lispro (ADMELOG) corrective regimen sliding scale   SubCutaneous at bedtime  levothyroxine Injectable 25 MICROGram(s) IV Push at bedtime  piperacillin/tazobactam IVPB.. 3.375 Gram(s) IV Intermittent every 8 hours  sodium chloride 0.9%. 1000 milliLiter(s) (70 mL/Hr) IV Continuous <Continuous>      TELEMETRY: 	    ECG:  	  RADIOLOGY:  OTHER: 	  	  LABS:	 	    CARDIAC MARKERS:                                11.2   11.37 )-----------( 327      ( 22 Jan 2023 07:14 )             35.5     01-22    138  |  102  |  16  ----------------------------<  107<H>  3.9   |  18<L>  |  1.13    Ca    8.6      22 Jan 2023 07:14    TPro  6.5  /  Alb  2.5<L>  /  TBili  0.6  /  DBili  x   /  AST  22  /  ALT  10  /  AlkPhos  162<H>  01-22    proBNP:   Lipid Profile: Cholesterol --  LDL --  HDL --      HgA1c:   TSH: Thyroid Stimulating Hormone, Serum: 4.62 uIU/mL (01-19 @ 07:45)          Assessment and plan  ---------------------------   81 y/o F--history from patient not reliable and obtained from patient's adult daughter above by me--patient with a history of an apparently complicated course of hospitalization at [facility]  in Nov 2022 with apparently severe septic shock requiring vasopressors from a complicated pyelonephritis RIGHT with hypotension and respiratory failure S/P intubation /extubation, COVID-19 + at the time (patient COVID-19 vaccinated x 3), with apparent MI at the time and 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  Daughter reports that the patient was discharged to [facility] with patient with impaired functional status following hospitalization, along with development of cognitive impairment.  Unclear if patient had a perinephric abscess at the time with drainage (?).   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 Wale Huffman at [facility] in Mar 2016 (I reviewed [facility] Office notes).    Patient now sent from the SNF following apparently 4 days of  RUQ and RIGHT flank pain with poor PO, nausea.  No chest pain/pressure.  NO fever, no chills, no rigors.  No red blood per rectum or melena.  No diaphoresis.   pt with hx of htn, ashd, cva, pt had stent in 2020, pt apparently had increase trop and was started empirically on Brilanta?? since november  if pt needs surgery or intervention may hold Brilanta, will discuss with her cardiologist Dr blum   will adjust cardiac meds  continue asa 81 mg daily  continue abx  beta blocker as tolerated  spoke to her daughter   GI eval  will plan for further cardiac michael  discussed with family last stent 3 years ago, pt was started on Brilanta on the last admission with no cardiac michael sec to increase trop  awaiting echo  will consider ischemia michael prior to dc

## 2023-01-23 LAB
ALBUMIN SERPL ELPH-MCNC: 2.6 G/DL — LOW (ref 3.3–5)
ALP SERPL-CCNC: 139 U/L — HIGH (ref 40–120)
ALT FLD-CCNC: 9 U/L — LOW (ref 10–45)
ANION GAP SERPL CALC-SCNC: 16 MMOL/L — SIGNIFICANT CHANGE UP (ref 5–17)
AST SERPL-CCNC: 21 U/L — SIGNIFICANT CHANGE UP (ref 10–40)
BASOPHILS # BLD AUTO: 0.07 K/UL — SIGNIFICANT CHANGE UP (ref 0–0.2)
BASOPHILS NFR BLD AUTO: 0.7 % — SIGNIFICANT CHANGE UP (ref 0–2)
BILIRUB SERPL-MCNC: 0.5 MG/DL — SIGNIFICANT CHANGE UP (ref 0.2–1.2)
BUN SERPL-MCNC: 13 MG/DL — SIGNIFICANT CHANGE UP (ref 7–23)
CALCIUM SERPL-MCNC: 8.9 MG/DL — SIGNIFICANT CHANGE UP (ref 8.4–10.5)
CHLORIDE SERPL-SCNC: 101 MMOL/L — SIGNIFICANT CHANGE UP (ref 96–108)
CO2 SERPL-SCNC: 20 MMOL/L — LOW (ref 22–31)
CREAT SERPL-MCNC: 1.01 MG/DL — SIGNIFICANT CHANGE UP (ref 0.5–1.3)
EGFR: 55 ML/MIN/1.73M2 — LOW
EOSINOPHIL # BLD AUTO: 0.13 K/UL — SIGNIFICANT CHANGE UP (ref 0–0.5)
EOSINOPHIL NFR BLD AUTO: 1.4 % — SIGNIFICANT CHANGE UP (ref 0–6)
GLUCOSE BLDC GLUCOMTR-MCNC: 119 MG/DL — HIGH (ref 70–99)
GLUCOSE BLDC GLUCOMTR-MCNC: 147 MG/DL — HIGH (ref 70–99)
GLUCOSE BLDC GLUCOMTR-MCNC: 172 MG/DL — HIGH (ref 70–99)
GLUCOSE BLDC GLUCOMTR-MCNC: 99 MG/DL — SIGNIFICANT CHANGE UP (ref 70–99)
GLUCOSE SERPL-MCNC: 128 MG/DL — HIGH (ref 70–99)
HCT VFR BLD CALC: 35.3 % — SIGNIFICANT CHANGE UP (ref 34.5–45)
HGB BLD-MCNC: 11.1 G/DL — LOW (ref 11.5–15.5)
IMM GRANULOCYTES NFR BLD AUTO: 2.2 % — HIGH (ref 0–0.9)
LYMPHOCYTES # BLD AUTO: 1.29 K/UL — SIGNIFICANT CHANGE UP (ref 1–3.3)
LYMPHOCYTES # BLD AUTO: 13.6 % — SIGNIFICANT CHANGE UP (ref 13–44)
MCHC RBC-ENTMCNC: 29.2 PG — SIGNIFICANT CHANGE UP (ref 27–34)
MCHC RBC-ENTMCNC: 31.4 GM/DL — LOW (ref 32–36)
MCV RBC AUTO: 92.9 FL — SIGNIFICANT CHANGE UP (ref 80–100)
MONOCYTES # BLD AUTO: 1.22 K/UL — HIGH (ref 0–0.9)
MONOCYTES NFR BLD AUTO: 12.8 % — SIGNIFICANT CHANGE UP (ref 2–14)
NEUTROPHILS # BLD AUTO: 6.59 K/UL — SIGNIFICANT CHANGE UP (ref 1.8–7.4)
NEUTROPHILS NFR BLD AUTO: 69.3 % — SIGNIFICANT CHANGE UP (ref 43–77)
NRBC # BLD: 0 /100 WBCS — SIGNIFICANT CHANGE UP (ref 0–0)
PLATELET # BLD AUTO: 340 K/UL — SIGNIFICANT CHANGE UP (ref 150–400)
POTASSIUM SERPL-MCNC: 3.6 MMOL/L — SIGNIFICANT CHANGE UP (ref 3.5–5.3)
POTASSIUM SERPL-SCNC: 3.6 MMOL/L — SIGNIFICANT CHANGE UP (ref 3.5–5.3)
PROT SERPL-MCNC: 6.6 G/DL — SIGNIFICANT CHANGE UP (ref 6–8.3)
RBC # BLD: 3.8 M/UL — SIGNIFICANT CHANGE UP (ref 3.8–5.2)
RBC # FLD: 15.9 % — HIGH (ref 10.3–14.5)
SODIUM SERPL-SCNC: 137 MMOL/L — SIGNIFICANT CHANGE UP (ref 135–145)
WBC # BLD: 9.51 K/UL — SIGNIFICANT CHANGE UP (ref 3.8–10.5)
WBC # FLD AUTO: 9.51 K/UL — SIGNIFICANT CHANGE UP (ref 3.8–10.5)

## 2023-01-23 PROCEDURE — 99232 SBSQ HOSP IP/OBS MODERATE 35: CPT

## 2023-01-23 RX ADMIN — PIPERACILLIN AND TAZOBACTAM 25 GRAM(S): 4; .5 INJECTION, POWDER, LYOPHILIZED, FOR SOLUTION INTRAVENOUS at 05:39

## 2023-01-23 RX ADMIN — PIPERACILLIN AND TAZOBACTAM 25 GRAM(S): 4; .5 INJECTION, POWDER, LYOPHILIZED, FOR SOLUTION INTRAVENOUS at 12:57

## 2023-01-23 RX ADMIN — HEPARIN SODIUM 5000 UNIT(S): 5000 INJECTION INTRAVENOUS; SUBCUTANEOUS at 23:42

## 2023-01-23 RX ADMIN — PIPERACILLIN AND TAZOBACTAM 25 GRAM(S): 4; .5 INJECTION, POWDER, LYOPHILIZED, FOR SOLUTION INTRAVENOUS at 21:30

## 2023-01-23 RX ADMIN — HEPARIN SODIUM 5000 UNIT(S): 5000 INJECTION INTRAVENOUS; SUBCUTANEOUS at 00:58

## 2023-01-23 RX ADMIN — Medication 81 MILLIGRAM(S): at 12:58

## 2023-01-23 RX ADMIN — HEPARIN SODIUM 5000 UNIT(S): 5000 INJECTION INTRAVENOUS; SUBCUTANEOUS at 12:58

## 2023-01-23 RX ADMIN — Medication 25 MICROGRAM(S): at 21:30

## 2023-01-23 RX ADMIN — Medication 1: at 18:01

## 2023-01-23 RX ADMIN — CHLORHEXIDINE GLUCONATE 1 APPLICATION(S): 213 SOLUTION TOPICAL at 05:39

## 2023-01-23 NOTE — PROGRESS NOTE ADULT - SUBJECTIVE AND OBJECTIVE BOX
SUBJECTIVE:   Seen and examined at bedside.     OBJECTIVE: T(C): 37.1 (01-23-23 @ 06:51), Max: 37.1 (01-23-23 @ 06:51)  HR: 89 (01-23-23 @ 06:51) (88 - 89)  BP: 120/78 (01-23-23 @ 06:51) (120/73 - 130/75)  RR: 18 (01-23-23 @ 06:51) (18 - 18)  SpO2: 97% (01-23-23 @ 06:51) (95% - 97%)  Wt(kg): --  I&O's Summary    22 Jan 2023 07:01  -  23 Jan 2023 07:00  --------------------------------------------------------  IN: 480 mL / OUT: 800 mL / NET: -320 mL      I&O's Detail    22 Jan 2023 07:01  -  23 Jan 2023 07:00  --------------------------------------------------------  IN:    Oral Fluid: 480 mL  Total IN: 480 mL    OUT:    Voided (mL): 800 mL  Total OUT: 800 mL    Total NET: -320 mL      Physical Exam  General: NAD  Resp: nonlabored   Abdomen: soft, nontender, nondistended     MEDICATIONS  (STANDING):  aspirin enteric coated 81 milliGRAM(s) Oral daily  chlorhexidine 2% Cloths 1 Application(s) Topical <User Schedule>  dextrose 5%. 1000 milliLiter(s) (100 mL/Hr) IV Continuous <Continuous>  dextrose 5%. 1000 milliLiter(s) (50 mL/Hr) IV Continuous <Continuous>  dextrose 50% Injectable 25 Gram(s) IV Push once  dextrose 50% Injectable 12.5 Gram(s) IV Push once  dextrose 50% Injectable 25 Gram(s) IV Push once  glucagon  Injectable 1 milliGRAM(s) IntraMuscular once  heparin   Injectable 5000 Unit(s) SubCutaneous <User Schedule>  insulin lispro (ADMELOG) corrective regimen sliding scale   SubCutaneous three times a day before meals  insulin lispro (ADMELOG) corrective regimen sliding scale   SubCutaneous at bedtime  levothyroxine Injectable 25 MICROGram(s) IV Push at bedtime  piperacillin/tazobactam IVPB.. 3.375 Gram(s) IV Intermittent every 8 hours  sodium chloride 0.9%. 1000 milliLiter(s) (70 mL/Hr) IV Continuous <Continuous>    MEDICATIONS  (PRN):  dextrose Oral Gel 15 Gram(s) Oral once PRN Blood Glucose LESS THAN 70 milliGRAM(s)/deciliter      LABS:                        11.2   11.37 )-----------( 327      ( 22 Jan 2023 07:14 )             35.5     01-22    138  |  102  |  16  ----------------------------<  107<H>  3.9   |  18<L>  |  1.13    Ca    8.6      22 Jan 2023 07:14    TPro  6.5  /  Alb  2.5<L>  /  TBili  0.6  /  DBili  x   /  AST  22  /  ALT  10  /  AlkPhos  162<H>  01-22

## 2023-01-23 NOTE — PROGRESS NOTE ADULT - ASSESSMENT
82 f with HTN, CAD, CHF, hospitalization at [facility] 11/2022 for septic shock due to citrobacter bacteremia and R pyelo c/b resp failure, MI and CVA, now p/w RUQ pain and nausea  here afebrile, WBC: 12  abd u/s:  Distended gallbladder and gallbladder wall thickening in the setting of positive sonographic Pond sign compatible with acute cholecystitis. Small right upper quadrant ascites.  abd/pelvis CT:  Gallbladder distention with marked wall thickening and surrounding inflammatory changes/fluid. Probable layering sludge, concerning for acute cholecystitis. Striated appearance of the kidneys, right greater than left likely reflects infection/pyelonephritis.     leukocytosis, RUQ pain due to acute cholecystitis  blood cx negative,  surgery stated no surgical interventions in view pt's comorbidities and IR stated no IR perc cynthia for risk of bleeding   * now pt improved clinically and WBC normalized  * c/w zosyn while in the hospital, will complete a 2 week course and when pt is ready for discharge will switch to augmentin now day 6 of antibiotics  * monitor CBC/diff and LFTs  * pt will need an ultimate surgical intervention for cholecystitis f/u with surgery as outpatient     The above assessment and plan was discussed with the primary team    Daniella Ribeiro MD  contact on teams  After 5pm and on weekends call [phone]       82 f with HTN, CAD, CHF, hospitalization at [facility] 11/2022 for septic shock due to citrobacter bacteremia and R pyelo c/b resp failure, MI and CVA, now p/w RUQ pain and nausea  here afebrile, WBC: 12  abd u/s:  Distended gallbladder and gallbladder wall thickening in the setting of positive sonographic Pond sign compatible with acute cholecystitis. Small right upper quadrant ascites.  abd/pelvis CT:  Gallbladder distention with marked wall thickening and surrounding inflammatory changes/fluid. Probable layering sludge, concerning for acute cholecystitis. Striated appearance of the kidneys, right greater than left likely reflects infection/pyelonephritis.     leukocytosis, RUQ pain due to acute cholecystitis  blood cx negative,  surgery stated no surgical interventions in view pt's comorbidities and IR stated no IR perc cynthia for risk of bleeding   * now pt improved clinically and WBC normalized  * c/w zosyn while in the hospital, will complete a 2 week course and when pt is ready for discharge will switch to augmentin now day 6 of antibiotics  * monitor CBC/diff and LFTs  * pt will need an ultimate surgical intervention for cholecystitis f/u with surgery as outpatient     The above assessment and plan was discussed with the primary team    Daniella Ribeiro MD  contact on teams  After 5pm and on weekends call [phone]       82 f with HTN, CAD, CHF, hospitalization at [facility] 11/2022 for septic shock due to citrobacter bacteremia and R pyelo c/b resp failure, MI and CVA, now p/w RUQ pain and nausea  here afebrile, WBC: 12  abd u/s:  Distended gallbladder and gallbladder wall thickening in the setting of positive sonographic Pond sign compatible with acute cholecystitis. Small right upper quadrant ascites.  abd/pelvis CT:  Gallbladder distention with marked wall thickening and surrounding inflammatory changes/fluid. Probable layering sludge, concerning for acute cholecystitis. Striated appearance of the kidneys, right greater than left likely reflects infection/pyelonephritis.     leukocytosis, RUQ pain due to acute cholecystitis  blood cx negative,  surgery stated no surgical interventions in view pt's comorbidities and IR stated no IR perc cynthia for risk of bleeding   * now pt improved clinically and WBC normalized  * c/w zosyn while in the hospital, will complete a 2 week course and when pt is ready for discharge will switch to augmentin now day 6 of antibiotics  * monitor CBC/diff and LFTs  * pt will need an ultimate surgical intervention for cholecystitis f/u with surgery as outpatient     The above assessment and plan was discussed with the primary team    Daniella Ribeiro MD  contact on teams  After 5pm and on weekends call [phone]

## 2023-01-23 NOTE — PROGRESS NOTE ADULT - ASSESSMENT
82F PMHx early dementia, CHF, hypothyroidism, anxiety, HTN, HLD, vertigo, DM, and gastritis with recent hospitalization 11/2022 at [facility] for urosepsis (fungal) requiring intubation. 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 Discharged 12/5 and at a rehab facility. Daughter reports pt's functional capacity declined severely since hospitalization. She now uses a wheelchair and has lost weight with poor appetite. Pt's care has been at [facility], but her PCP in rehab Dr. Santos recommended she come to [facility]. Pt has worsening r-sided abd pain since Sunday. Imaging suggests acute cholecystitis.    RECOMMENDATIONS:  - No acute surgical intervention given pt comorbidities  - Continue IV Abx   - consider IR perc cholecystostomy if worsens     [facility]  [id number] 82F PMHx early dementia, CHF, hypothyroidism, anxiety, HTN, HLD, vertigo, DM, and gastritis with recent hospitalization 11/2022 at [facility] for urosepsis (fungal) requiring intubation. 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 Discharged 12/5 and at a rehab facility. Daughter reports pt's functional capacity declined severely since hospitalization. She now uses a wheelchair and has lost weight with poor appetite. Pt's care has been at [facility], but her PCP in rehab Dr. Santos recommended she come to [facility]. Pt has worsening r-sided abd pain since Sunday. Imaging suggests acute cholecystitis.    RECOMMENDATIONS:  - No acute surgical intervention given pt comorbidities  - Continue IV Abx   - consider IR perc cholecystostomy if worsens     [facility]  [id number] 82F PMHx early dementia, CHF, hypothyroidism, anxiety, HTN, HLD, vertigo, DM, and gastritis with recent hospitalization 11/2022 at [facility] for urosepsis (fungal) requiring intubation. 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 Discharged 12/5 and at a rehab facility. Daughter reports pt's functional capacity declined severely since hospitalization. She now uses a wheelchair and has lost weight with poor appetite. Pt's care has been at [facility], but her PCP in rehab Dr. Santos recommended she come to [facility]. Pt has worsening r-sided abd pain since Sunday. Imaging suggests acute cholecystitis.    RECOMMENDATIONS:  - No acute surgical intervention given pt comorbidities  - Continue IV Abx   - consider IR perc cholecystostomy if worsens     [facility]  [id number]

## 2023-01-23 NOTE — PROGRESS NOTE ADULT - REASON FOR ADMISSION
Sent from [facility] for RUQ abdominal pain since 1/15/23 Sent from [facility] for RUQ abdominal pain since 1/15/23 Sent from [facility] for RUQ abdominal pain since 1/15/23

## 2023-01-23 NOTE — PROGRESS NOTE ADULT - SUBJECTIVE AND OBJECTIVE BOX
CARDIOLOGY     PROGRESS  NOTE   ________________________________________________    CHIEF COMPLAINT:Patient is a 82y old  Female who presents with a chief complaint of Sent from [facility] for RUQ abdominal pain since 1/15/23 (23 Jan 2023 07:41)  no complain  	  REVIEW OF SYSTEMS:  CONSTITUTIONAL: No fever, weight loss, or fatigue  EYES: No eye pain, visual disturbances, or discharge  ENT:  No difficulty hearing, tinnitus, vertigo; No sinus or throat pain  NECK: No pain or stiffness  RESPIRATORY: No cough, wheezing, chills or hemoptysis; No Shortness of Breath  CARDIOVASCULAR: No chest pain, palpitations, passing out, dizziness, or leg swelling  GASTROINTESTINAL: No abdominal or epigastric pain. No nausea, vomiting, or hematemesis; No diarrhea or constipation. No melena or hematochezia.  GENITOURINARY: No dysuria, frequency, hematuria, or incontinence  NEUROLOGICAL: No headaches, memory loss, loss of strength, numbness, or tremors  SKIN: No itching, burning, rashes, or lesions   LYMPH Nodes: No enlarged glands  ENDOCRINE: No heat or cold intolerance; No hair loss  MUSCULOSKELETAL: No joint pain or swelling; No muscle, back, or extremity pain  PSYCHIATRIC: No depression, anxiety, mood swings, or difficulty sleeping  HEME/LYMPH: No easy bruising, or bleeding gums  ALLERGY AND IMMUNOLOGIC: No hives or eczema	    [ ] All others negative	  [x ] Unable to obtain    PHYSICAL EXAM:  T(C): 37.1 (01-23-23 @ 06:51), Max: 37.1 (01-23-23 @ 06:51)  HR: 89 (01-23-23 @ 06:51) (88 - 89)  BP: 120/78 (01-23-23 @ 06:51) (120/73 - 130/75)  RR: 18 (01-23-23 @ 06:51) (18 - 18)  SpO2: 97% (01-23-23 @ 06:51) (95% - 97%)  Wt(kg): --  I&O's Summary    22 Jan 2023 07:01  -  23 Jan 2023 07:00  --------------------------------------------------------  IN: 480 mL / OUT: 800 mL / NET: -320 mL        Appearance: Normal	  HEENT:   Normal oral mucosa, PERRL, EOMI	  Lymphatic: No lymphadenopathy  Cardiovascular: Normal S1 S2, No JVD,+ murmurs, No edema  Respiratory: rhonchi  Psychiatry: A & O x 3, Mood & affect appropriate  Gastrointestinal:  Soft, Non-tender, + BS	  Skin: No rashes, No ecchymoses, No cyanosis	  Neurologic: Non-focal  Extremities: Normal range of motion, No clubbing, cyanosis or edema  Vascular: Peripheral pulses palpable 2+ bilaterally    MEDICATIONS  (STANDING):  aspirin enteric coated 81 milliGRAM(s) Oral daily  chlorhexidine 2% Cloths 1 Application(s) Topical <User Schedule>  dextrose 5%. 1000 milliLiter(s) (100 mL/Hr) IV Continuous <Continuous>  dextrose 5%. 1000 milliLiter(s) (50 mL/Hr) IV Continuous <Continuous>  dextrose 50% Injectable 25 Gram(s) IV Push once  dextrose 50% Injectable 12.5 Gram(s) IV Push once  dextrose 50% Injectable 25 Gram(s) IV Push once  glucagon  Injectable 1 milliGRAM(s) IntraMuscular once  heparin   Injectable 5000 Unit(s) SubCutaneous <User Schedule>  insulin lispro (ADMELOG) corrective regimen sliding scale   SubCutaneous three times a day before meals  insulin lispro (ADMELOG) corrective regimen sliding scale   SubCutaneous at bedtime  levothyroxine Injectable 25 MICROGram(s) IV Push at bedtime  piperacillin/tazobactam IVPB.. 3.375 Gram(s) IV Intermittent every 8 hours  sodium chloride 0.9%. 1000 milliLiter(s) (70 mL/Hr) IV Continuous <Continuous>      TELEMETRY: 	    ECG:  	  RADIOLOGY:  OTHER: 	  	  LABS:	 	    CARDIAC MARKERS:                                11.2   11.37 )-----------( 327      ( 22 Jan 2023 07:14 )             35.5     01-22    138  |  102  |  16  ----------------------------<  107<H>  3.9   |  18<L>  |  1.13    Ca    8.6      22 Jan 2023 07:14    TPro  6.5  /  Alb  2.5<L>  /  TBili  0.6  /  DBili  x   /  AST  22  /  ALT  10  /  AlkPhos  162<H>  01-22    proBNP:   Lipid Profile: Cholesterol --  LDL --  HDL --      HgA1c:   TSH: Thyroid Stimulating Hormone, Serum: 4.62 uIU/mL (01-19 @ 07:45)          Assessment and plan  ---------------------------   83 y/o F--history from patient not reliable and obtained from patient's adult daughter above by me--patient with a history of an apparently complicated course of hospitalization at [facility]  in Nov 2022 with apparently severe septic shock requiring vasopressors from a complicated pyelonephritis RIGHT with hypotension and respiratory failure S/P intubation /extubation, COVID-19 + at the time (patient COVID-19 vaccinated x 3), with apparent MI at the time and 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  Daughter reports that the patient was discharged to [facility] with patient with impaired functional status following hospitalization, along with development of cognitive impairment.  Unclear if patient had a perinephric abscess at the time with drainage (?).   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 Wale Huffman at [facility] in Mar 2016 (I reviewed [facility] Office notes).    Patient now sent from the SNF following apparently 4 days of  RUQ and RIGHT flank pain with poor PO, nausea.  No chest pain/pressure.  NO fever, no chills, no rigors.  No red blood per rectum or melena.  No diaphoresis.   pt with hx of htn, ashd, cva, pt had stent in 2020, pt apparently had increase trop and was started empirically on Brilanta?? since november  if pt needs surgery or intervention may hold Brilanta, will discuss with her cardiologist Dr blum   will adjust cardiac meds  continue asa 81 mg daily  continue abx  beta blocker as tolerated  spoke to her daughter   GI eval  will plan for further cardiac michael  discussed with family last stent 3 years ago, pt was started on Brilanta on the last admission with no cardiac michael sec to increase trop  awaiting echo  will consider ischemia michael prior to dc  awaiting cynthia tube    	                    CARDIOLOGY     PROGRESS  NOTE   ________________________________________________    CHIEF COMPLAINT:Patient is a 82y old  Female who presents with a chief complaint of Sent from [facility] for RUQ abdominal pain since 1/15/23 (23 Jan 2023 07:41)  no complain  	  REVIEW OF SYSTEMS:  CONSTITUTIONAL: No fever, weight loss, or fatigue  EYES: No eye pain, visual disturbances, or discharge  ENT:  No difficulty hearing, tinnitus, vertigo; No sinus or throat pain  NECK: No pain or stiffness  RESPIRATORY: No cough, wheezing, chills or hemoptysis; No Shortness of Breath  CARDIOVASCULAR: No chest pain, palpitations, passing out, dizziness, or leg swelling  GASTROINTESTINAL: No abdominal or epigastric pain. No nausea, vomiting, or hematemesis; No diarrhea or constipation. No melena or hematochezia.  GENITOURINARY: No dysuria, frequency, hematuria, or incontinence  NEUROLOGICAL: No headaches, memory loss, loss of strength, numbness, or tremors  SKIN: No itching, burning, rashes, or lesions   LYMPH Nodes: No enlarged glands  ENDOCRINE: No heat or cold intolerance; No hair loss  MUSCULOSKELETAL: No joint pain or swelling; No muscle, back, or extremity pain  PSYCHIATRIC: No depression, anxiety, mood swings, or difficulty sleeping  HEME/LYMPH: No easy bruising, or bleeding gums  ALLERGY AND IMMUNOLOGIC: No hives or eczema	    [ ] All others negative	  [x ] Unable to obtain    PHYSICAL EXAM:  T(C): 37.1 (01-23-23 @ 06:51), Max: 37.1 (01-23-23 @ 06:51)  HR: 89 (01-23-23 @ 06:51) (88 - 89)  BP: 120/78 (01-23-23 @ 06:51) (120/73 - 130/75)  RR: 18 (01-23-23 @ 06:51) (18 - 18)  SpO2: 97% (01-23-23 @ 06:51) (95% - 97%)  Wt(kg): --  I&O's Summary    22 Jan 2023 07:01  -  23 Jan 2023 07:00  --------------------------------------------------------  IN: 480 mL / OUT: 800 mL / NET: -320 mL        Appearance: Normal	  HEENT:   Normal oral mucosa, PERRL, EOMI	  Lymphatic: No lymphadenopathy  Cardiovascular: Normal S1 S2, No JVD,+ murmurs, No edema  Respiratory: rhonchi  Psychiatry: A & O x 3, Mood & affect appropriate  Gastrointestinal:  Soft, Non-tender, + BS	  Skin: No rashes, No ecchymoses, No cyanosis	  Neurologic: Non-focal  Extremities: Normal range of motion, No clubbing, cyanosis or edema  Vascular: Peripheral pulses palpable 2+ bilaterally    MEDICATIONS  (STANDING):  aspirin enteric coated 81 milliGRAM(s) Oral daily  chlorhexidine 2% Cloths 1 Application(s) Topical <User Schedule>  dextrose 5%. 1000 milliLiter(s) (100 mL/Hr) IV Continuous <Continuous>  dextrose 5%. 1000 milliLiter(s) (50 mL/Hr) IV Continuous <Continuous>  dextrose 50% Injectable 25 Gram(s) IV Push once  dextrose 50% Injectable 12.5 Gram(s) IV Push once  dextrose 50% Injectable 25 Gram(s) IV Push once  glucagon  Injectable 1 milliGRAM(s) IntraMuscular once  heparin   Injectable 5000 Unit(s) SubCutaneous <User Schedule>  insulin lispro (ADMELOG) corrective regimen sliding scale   SubCutaneous three times a day before meals  insulin lispro (ADMELOG) corrective regimen sliding scale   SubCutaneous at bedtime  levothyroxine Injectable 25 MICROGram(s) IV Push at bedtime  piperacillin/tazobactam IVPB.. 3.375 Gram(s) IV Intermittent every 8 hours  sodium chloride 0.9%. 1000 milliLiter(s) (70 mL/Hr) IV Continuous <Continuous>      TELEMETRY: 	    ECG:  	  RADIOLOGY:  OTHER: 	  	  LABS:	 	    CARDIAC MARKERS:                                11.2   11.37 )-----------( 327      ( 22 Jan 2023 07:14 )             35.5     01-22    138  |  102  |  16  ----------------------------<  107<H>  3.9   |  18<L>  |  1.13    Ca    8.6      22 Jan 2023 07:14    TPro  6.5  /  Alb  2.5<L>  /  TBili  0.6  /  DBili  x   /  AST  22  /  ALT  10  /  AlkPhos  162<H>  01-22    proBNP:   Lipid Profile: Cholesterol --  LDL --  HDL --      HgA1c:   TSH: Thyroid Stimulating Hormone, Serum: 4.62 uIU/mL (01-19 @ 07:45)          Assessment and plan  ---------------------------   83 y/o F--history from patient not reliable and obtained from patient's adult daughter above by me--patient with a history of an apparently complicated course of hospitalization at [facility]  in Nov 2022 with apparently severe septic shock requiring vasopressors from a complicated pyelonephritis RIGHT with hypotension and respiratory failure S/P intubation /extubation, COVID-19 + at the time (patient COVID-19 vaccinated x 3), with apparent MI at the time and 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  Daughter reports that the patient was discharged to [facility] with patient with impaired functional status following hospitalization, along with development of cognitive impairment.  Unclear if patient had a perinephric abscess at the time with drainage (?).   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 Wale Huffman at [facility] in Mar 2016 (I reviewed [facility] Office notes).    Patient now sent from the SNF following apparently 4 days of  RUQ and RIGHT flank pain with poor PO, nausea.  No chest pain/pressure.  NO fever, no chills, no rigors.  No red blood per rectum or melena.  No diaphoresis.   pt with hx of htn, ashd, cva, pt had stent in 2020, pt apparently had increase trop and was started empirically on Brilanta?? since november  if pt needs surgery or intervention may hold Brilanta, will discuss with her cardiologist Dr blum   will adjust cardiac meds  continue asa 81 mg daily  continue abx  beta blocker as tolerated  spoke to her daughter   GI eval  will plan for further cardiac michael  discussed with family last stent 3 years ago, pt was started on Brilanta on the last admission with no cardiac michael sec to increase trop  awaiting echo  will consider ischemia michael prior to dc  awaiting cynthia tube    	                    CARDIOLOGY     PROGRESS  NOTE   ________________________________________________    CHIEF COMPLAINT:Patient is a 82y old  Female who presents with a chief complaint of Sent from [facility] for RUQ abdominal pain since 1/15/23 (23 Jan 2023 07:41)  no complain  	  REVIEW OF SYSTEMS:  CONSTITUTIONAL: No fever, weight loss, or fatigue  EYES: No eye pain, visual disturbances, or discharge  ENT:  No difficulty hearing, tinnitus, vertigo; No sinus or throat pain  NECK: No pain or stiffness  RESPIRATORY: No cough, wheezing, chills or hemoptysis; No Shortness of Breath  CARDIOVASCULAR: No chest pain, palpitations, passing out, dizziness, or leg swelling  GASTROINTESTINAL: No abdominal or epigastric pain. No nausea, vomiting, or hematemesis; No diarrhea or constipation. No melena or hematochezia.  GENITOURINARY: No dysuria, frequency, hematuria, or incontinence  NEUROLOGICAL: No headaches, memory loss, loss of strength, numbness, or tremors  SKIN: No itching, burning, rashes, or lesions   LYMPH Nodes: No enlarged glands  ENDOCRINE: No heat or cold intolerance; No hair loss  MUSCULOSKELETAL: No joint pain or swelling; No muscle, back, or extremity pain  PSYCHIATRIC: No depression, anxiety, mood swings, or difficulty sleeping  HEME/LYMPH: No easy bruising, or bleeding gums  ALLERGY AND IMMUNOLOGIC: No hives or eczema	    [ ] All others negative	  [x ] Unable to obtain    PHYSICAL EXAM:  T(C): 37.1 (01-23-23 @ 06:51), Max: 37.1 (01-23-23 @ 06:51)  HR: 89 (01-23-23 @ 06:51) (88 - 89)  BP: 120/78 (01-23-23 @ 06:51) (120/73 - 130/75)  RR: 18 (01-23-23 @ 06:51) (18 - 18)  SpO2: 97% (01-23-23 @ 06:51) (95% - 97%)  Wt(kg): --  I&O's Summary    22 Jan 2023 07:01  -  23 Jan 2023 07:00  --------------------------------------------------------  IN: 480 mL / OUT: 800 mL / NET: -320 mL        Appearance: Normal	  HEENT:   Normal oral mucosa, PERRL, EOMI	  Lymphatic: No lymphadenopathy  Cardiovascular: Normal S1 S2, No JVD,+ murmurs, No edema  Respiratory: rhonchi  Psychiatry: A & O x 3, Mood & affect appropriate  Gastrointestinal:  Soft, Non-tender, + BS	  Skin: No rashes, No ecchymoses, No cyanosis	  Neurologic: Non-focal  Extremities: Normal range of motion, No clubbing, cyanosis or edema  Vascular: Peripheral pulses palpable 2+ bilaterally    MEDICATIONS  (STANDING):  aspirin enteric coated 81 milliGRAM(s) Oral daily  chlorhexidine 2% Cloths 1 Application(s) Topical <User Schedule>  dextrose 5%. 1000 milliLiter(s) (100 mL/Hr) IV Continuous <Continuous>  dextrose 5%. 1000 milliLiter(s) (50 mL/Hr) IV Continuous <Continuous>  dextrose 50% Injectable 25 Gram(s) IV Push once  dextrose 50% Injectable 12.5 Gram(s) IV Push once  dextrose 50% Injectable 25 Gram(s) IV Push once  glucagon  Injectable 1 milliGRAM(s) IntraMuscular once  heparin   Injectable 5000 Unit(s) SubCutaneous <User Schedule>  insulin lispro (ADMELOG) corrective regimen sliding scale   SubCutaneous three times a day before meals  insulin lispro (ADMELOG) corrective regimen sliding scale   SubCutaneous at bedtime  levothyroxine Injectable 25 MICROGram(s) IV Push at bedtime  piperacillin/tazobactam IVPB.. 3.375 Gram(s) IV Intermittent every 8 hours  sodium chloride 0.9%. 1000 milliLiter(s) (70 mL/Hr) IV Continuous <Continuous>      TELEMETRY: 	    ECG:  	  RADIOLOGY:  OTHER: 	  	  LABS:	 	    CARDIAC MARKERS:                                11.2   11.37 )-----------( 327      ( 22 Jan 2023 07:14 )             35.5     01-22    138  |  102  |  16  ----------------------------<  107<H>  3.9   |  18<L>  |  1.13    Ca    8.6      22 Jan 2023 07:14    TPro  6.5  /  Alb  2.5<L>  /  TBili  0.6  /  DBili  x   /  AST  22  /  ALT  10  /  AlkPhos  162<H>  01-22    proBNP:   Lipid Profile: Cholesterol --  LDL --  HDL --      HgA1c:   TSH: Thyroid Stimulating Hormone, Serum: 4.62 uIU/mL (01-19 @ 07:45)          Assessment and plan  ---------------------------   83 y/o F--history from patient not reliable and obtained from patient's adult daughter above by me--patient with a history of an apparently complicated course of hospitalization at [facility]  in Nov 2022 with apparently severe septic shock requiring vasopressors from a complicated pyelonephritis RIGHT with hypotension and respiratory failure S/P intubation /extubation, COVID-19 + at the time (patient COVID-19 vaccinated x 3), with apparent MI at the time and 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  Daughter reports that the patient was discharged to [facility] with patient with impaired functional status following hospitalization, along with development of cognitive impairment.  Unclear if patient had a perinephric abscess at the time with drainage (?).   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 Wale Huffman at [facility] in Mar 2016 (I reviewed [facility] Office notes).    Patient now sent from the SNF following apparently 4 days of  RUQ and RIGHT flank pain with poor PO, nausea.  No chest pain/pressure.  NO fever, no chills, no rigors.  No red blood per rectum or melena.  No diaphoresis.   pt with hx of htn, ashd, cva, pt had stent in 2020, pt apparently had increase trop and was started empirically on Brilanta?? since november  if pt needs surgery or intervention may hold Brilanta, will discuss with her cardiologist Dr blum   will adjust cardiac meds  continue asa 81 mg daily  continue abx  beta blocker as tolerated  spoke to her daughter   GI eval  will plan for further cardiac michael  discussed with family last stent 3 years ago, pt was started on Brilanta on the last admission with no cardiac michael sec to increase trop  awaiting echo  will consider ischemia michael prior to dc  awaiting cynthia tube

## 2023-01-23 NOTE — PROGRESS NOTE ADULT - SUBJECTIVE AND OBJECTIVE BOX
Follow Up:  acute cholecystitis     Interval History/ROS: no fever and WBC normalized, improved abd pain, no cough but c/o occasional dysuria            Allergies  No Known Allergies        ANTIMICROBIALS:  piperacillin/tazobactam IVPB.. 3.375 every 8 hours      OTHER MEDS:  aspirin enteric coated 81 milliGRAM(s) Oral daily  chlorhexidine 2% Cloths 1 Application(s) Topical <User Schedule>  dextrose 5%. 1000 milliLiter(s) IV Continuous <Continuous>  dextrose 5%. 1000 milliLiter(s) IV Continuous <Continuous>  dextrose 50% Injectable 25 Gram(s) IV Push once  dextrose 50% Injectable 12.5 Gram(s) IV Push once  dextrose 50% Injectable 25 Gram(s) IV Push once  dextrose Oral Gel 15 Gram(s) Oral once PRN  glucagon  Injectable 1 milliGRAM(s) IntraMuscular once  heparin   Injectable 5000 Unit(s) SubCutaneous <User Schedule>  insulin lispro (ADMELOG) corrective regimen sliding scale   SubCutaneous three times a day before meals  insulin lispro (ADMELOG) corrective regimen sliding scale   SubCutaneous at bedtime  levothyroxine Injectable 25 MICROGram(s) IV Push at bedtime  sodium chloride 0.9%. 1000 milliLiter(s) IV Continuous <Continuous>      Vital Signs Last 24 Hrs  T(C): 36.5 (23 Jan 2023 12:27), Max: 37.1 (23 Jan 2023 06:51)  T(F): 97.7 (23 Jan 2023 12:27), Max: 98.7 (23 Jan 2023 06:51)  HR: 87 (23 Jan 2023 12:27) (85 - 89)  BP: 114/68 (23 Jan 2023 12:27) (114/68 - 120/78)  BP(mean): --  RR: 18 (23 Jan 2023 12:27) (18 - 18)  SpO2: 95% (23 Jan 2023 12:27) (95% - 97%)    Parameters below as of 23 Jan 2023 12:27  Patient On (Oxygen Delivery Method): room air        Physical Exam:  General:    NAD, non toxic  Cardio:    regular S1,S2, + murmur  Respiratory:   clear b/l, no wheezing  abd:   soft, BS +, RUQ tenderness  :     no CVAT, no suprapubic tenderness, no scott  Musculoskeletal : no joint swelling, no edema  Skin:    no rash  vascular: no phlebitis                          11.1   9.51  )-----------( 340      ( 23 Jan 2023 11:53 )             35.3       01-23    137  |  101  |  13  ----------------------------<  128<H>  3.6   |  20<L>  |  1.01    Ca    8.9      23 Jan 2023 11:53    TPro  6.6  /  Alb  2.6<L>  /  TBili  0.5  /  DBili  x   /  AST  21  /  ALT  9<L>  /  AlkPhos  139<H>  01-23          MICROBIOLOGY:  v  .Blood Blood-Venous  01-19-23   No growth to date.  --  --                RADIOLOGY:  Images independently visualized and reviewed personally, findings as below  < from: US Renal (01.19.23 @ 15:26) >  IMPRESSION:  No perinephric fluid collection. Trace perihepatic fluid is noted.    Right renal upper pole cyst.    < end of copied text >  < from: CT Abdomen and Pelvis No Cont (01.18.23 @ 19:40) >  IMPRESSION:    Limited noncontrast study.    Gallbladder distention with marked wall thickening and surrounding   inflammatory changes/fluid. Probable layering sludge. Findings are   concerning for acute cholecystitis. Correlate with same day ultrasound.    Retained contrast of bilateral renal parenchyma likelyreflects degree of   renal dysfunction/nephropathy, correlate for date of contrast   administration. Striated appearance of the kidneys, right greater than   left likely reflects infection/pyelonephritis.    Severe anterior wedging deformity of L3 vertebral body, favored to be   chronic. Correlate with clinical symptoms.    3 mm right middle lobe nodule can be followed dedicated chest CT in 12   months, or as per patient risk factors.    Additional findings as above. Please read above.      < end of copied text >  < from: US Abdomen Upper Quadrant Right (01.18.23 @ 19:02) >  IMPRESSION:  Distended gallbladder and gallbladder wall thickening in the setting of   positive sonographic Pond sign compatible with acute cholecystitis.  Small right upper quadrant ascites.  Small right pleural effusion.    < end of copied text >

## 2023-01-23 NOTE — PROGRESS NOTE ADULT - SUBJECTIVE AND OBJECTIVE BOX
Patient is a 82y old  Female who presents with a chief complaint of [facility] for [facility]Q abdominal pain since 1/15/23 (23 Jan 2023 07:52)      INTERVAL HPI/OVERNIGHT EVENTS:  Vitals stable. Leukocytosis resolving. Renal failure resolved. Still waiting for IR to put the the percutaneous cholecystostomy tube. Aspirin resumed  by cardiology who disagreed to hold it for now, agreed to hold Brillanta for 7 days.  Continue IV Zosyn. F/u IR for procedure on Thursday.     Pain Location & Control: OK    MEDICATIONS  (STANDING):  aspirin enteric coated 81 milliGRAM(s) Oral daily  chlorhexidine 2% Cloths 1 Application(s) Topical <User Schedule>  dextrose 5%. 1000 milliLiter(s) (100 mL/Hr) IV Continuous <Continuous>  dextrose 5%. 1000 milliLiter(s) (50 mL/Hr) IV Continuous <Continuous>  dextrose 50% Injectable 25 Gram(s) IV Push once  dextrose 50% Injectable 12.5 Gram(s) IV Push once  dextrose 50% Injectable 25 Gram(s) IV Push once  glucagon  Injectable 1 milliGRAM(s) IntraMuscular once  heparin   Injectable 5000 Unit(s) SubCutaneous <User Schedule>  insulin lispro (ADMELOG) corrective regimen sliding scale   SubCutaneous three times a day before meals  insulin lispro (ADMELOG) corrective regimen sliding scale   SubCutaneous at bedtime  levothyroxine Injectable 25 MICROGram(s) IV Push at bedtime  piperacillin/tazobactam IVPB.. 3.375 Gram(s) IV Intermittent every 8 hours  sodium chloride 0.9%. 1000 milliLiter(s) (70 mL/Hr) IV Continuous <Continuous>    MEDICATIONS  (PRN):  dextrose Oral Gel 15 Gram(s) Oral once PRN Blood Glucose LESS THAN 70 milliGRAM(s)/deciliter      Allergies    No Known Allergies    Intolerances        REVIEW OF SYSTEMS:  CONSTITUTIONAL: No fever, weight loss, or fatigue  EYES: No eye pain, visual disturbances, or discharge  ENMT:  No difficulty hearing, tinnitus, vertigo; No sinus or throat pain  NECK: No pain or stiffness  BREASTS: No pain, masses, or nipple discharge  RESPIRATORY: No cough, wheezing, chills or hemoptysis; No shortness of breath  CARDIOVASCULAR: No chest pain, palpitations, dizziness, or leg swelling  GASTROINTESTINAL: No abdominal or epigastric pain. No nausea, vomiting, or hematemesis; No diarrhea or constipation. No melena or hematochezia.  GENITOURINARY: No dysuria, frequency, hematuria, or incontinence  NEUROLOGICAL: No headaches, memory loss, loss of strength, numbness, or tremors  SKIN: No itching, burning, rashes, or lesions   LYMPH NODES: No enlarged glands  ENDOCRINE: No heat or cold intolerance; No hair loss; No polydipsia or polyuria  MUSCULOSKELETAL: No back pain  PSYCHIATRIC: No depression, anxiety, mood swings, or difficulty sleeping  HEME/LYMPH: No easy bruising, or bleeding gums  ALLERGY AND IMMUNOLOGIC: No hives or eczema    Vital Signs Last 24 Hrs  T(C): 37.1 (23 Jan 2023 06:51), Max: 37.1 (23 Jan 2023 06:51)  T(F): 98.7 (23 Jan 2023 06:51), Max: 98.7 (23 Jan 2023 06:51)  HR: 89 (23 Jan 2023 06:51) (88 - 89)  BP: 120/78 (23 Jan 2023 06:51) (120/73 - 130/75)  BP(mean): --  RR: 18 (23 Jan 2023 06:51) (18 - 18)  SpO2: 97% (23 Jan 2023 06:51) (95% - 97%)    Parameters below as of 23 Jan 2023 06:51  Patient On (Oxygen Delivery Method): room air        PHYSICAL EXAM:  GENERAL: NAD, well-groomed, well-developed  HEAD:  Atraumatic, Normocephalic  EYES: EOMI, PERRLA, conjunctiva and sclera clear  ENMT: No tonsillar erythema, exudates, or enlargement; Moist mucous membranes, Good dentition, No lesions  NECK: Supple, No JVD, Normal thyroid  NERVOUS SYSTEM:  Alert & Oriented X3, Good concentration; Motor Strength 5/5 B/L upper and lower extremities; DTRs 2+ intact and symmetric  CHEST/LUNG: Clear to auscultation bilaterally; No rales, rhonchi, wheezing, or rubs  HEART: Regular rate and rhythm; No murmurs, rubs, or gallops  ABDOMEN: Soft, Nontender, Nondistended; Bowel sounds present  EXTREMITIES:  2+ Peripheral Pulses, No clubbing or cyanosis  LYMPH: No lymphadenopathy noted  SKIN: No rashes or lesions      LABS:      Ca    8.6        22 Jan 2023 07:14          CAPILLARY BLOOD GLUCOSE      POCT Blood Glucose.: 147 mg/dL (23 Jan 2023 08:27)  POCT Blood Glucose.: 125 mg/dL (22 Jan 2023 23:57)  POCT Blood Glucose.: 146 mg/dL (22 Jan 2023 17:46)  POCT Blood Glucose.: 148 mg/dL (22 Jan 2023 12:20)        Cultures  Culture Results:   No growth to date. (01-19-23 @ 05:56)      RADIOLOGY & ADDITIONAL TESTS:    Imaging Personally Reviewed:  [X ] YES  [ ] NO    Consultant(s) Notes Reviewed:  [ X] YES  [ ] NO    Care Discussed with Consultants/Other Providers [ X] YES  [ ] NO

## 2023-01-23 NOTE — PROGRESS NOTE ADULT - ATTENDING COMMENTS
DATE OF SERVICE: 01-23-23 @ 09:53    Seen and examined. Denies pain today    WBC downtrending  abdomen soft NT/ND    Would continue abx for now, no plan for surgical intervention, she remains a poor operative candidate  Diet as tolerated

## 2023-01-23 NOTE — PROGRESS NOTE ADULT - PROBLEM SELECTOR PLAN 6
See above.    Would consider clarifying history and recent workup from last hospitalization at [facility]. See above.    Would consider clarifying history and recent workup from last hospitalization at [facility]. See above.    Would consider clarifying history and recent workup from last hospitalization at [facility].

## 2023-01-23 NOTE — PROGRESS NOTE ADULT - REASON FOR ADMISSION
Sent from [facility] for RUQ abdominal pain since 1/15/23 Sent from [facility] for RUQ abdominal pain since 1/15/23 Sent from [facility] for RUQ abdominal pain since 1/15/23

## 2023-01-23 NOTE — PROGRESS NOTE ADULT - REASON FOR ADMISSION
"Summary: CVA R [facility]  DATE & TIME: 9/3/2022 [id number]  Cognitive Concerns/ Orientation : A&O 2-3, disoriented to time and place/situation sometimes. Waxes and wanes.   BEHAVIOR & AGGRESSION TOOL COLOR: Green/yellow- does call out often saying \"help\",  tearful at times, wants to go home. Educated and reoriented patient, provided emotional support. Did get very anxious and agitated yelling often, given zyprexa x1 with good effect  CIWA SCORE: N/A   ABNL VS/O2: VSS on RA  MOBILITY: Total/lift, up to chair today  PAIN MANAGMENT: Back pain at times, refused tylenol and voltaren gel; repositioning helped  DIET: Regular, poor appetite today (states she is not hungry), must sit up right for meals per speech  BOWEL/BLADDER: Incontinent of Bowel and bladder. Using Purewick. 2 soft BM's this shift  ABNL LAB/BG: K 3.1 (patient had been refusing scheduled K), replaced via protocol with apple juice and improved to 3.8, next check tomorrow AM  DRAIN/DEVICES: Purewick, no IV access MD aware  TELEMETRY RHYTHM: N/A  SKIN: Lower back/R flank wound dressing CDI next change 9/5; dry, patty, flaky BLE, +1-2 BLE edema - edemaware on. Pannus/skin folds improved (no open wounds), shreyas cleanse and antifungal applied. New WOC consult for bilateral buttock small openings- mepilex in place.   TESTS/PROCEDURES: none this shift  D/C DATE: Pending palcement  Discharge Barriers: needs LTC, Behaviors  OTHER IMPORTANT INFO: Per orders, Vitals only twice a day and neuro checks once a day. Neuros intact, BLE weakness. PO lasix switched to spiralactone to help with potassium, patient is resistant to take potassium but did today with apple juice.                       " [facility] for RUQ abdominal pain since 1/15/23 Sent [facility] for RUQ abdominal pain since 1/15/23 Sent from [facility] for RUQ abdominal pain since 1/15/23

## 2023-01-23 NOTE — PROGRESS NOTE ADULT - ASSESSMENT
Referral of patient to the ER from the SNF with RUQ pain with CTT and RUQ US evidence of acute cholecystitis, triglycerides nondiagnostic and suspect passed stone in the setting of patient with a complex medical history of a prolonged hospitalization at [facility] ([facility]) with severe septic shock requiring vasopressors, intubation/extubation at the time, with complicating MI and CVA, and COVID-19 at the time.  Patient previously independent in the summer 2022 but apparently since hospitalization has manifested progressive cognitive impairment, and was due to be seen at the SNF by a neurologist and cardiologist on followup.    Seen by general surgery recommended poor candidate for alb choleugenio  recommended percutaneous cholecystoscopy tube. Evaluated by IR who recommended to hold Brillanta for 7 days. Aspirin resumed  by cardiology. Possible procedure on next Thursday for percutaneous cholecystoscopy.     Invanz switched to Zosyn. Continue IV fluid. RUQ tenderness resolved.     Leukocytosis resolving. Continue Zosyn.     NO clear collection on CTT abdomen but limited due to lack of IV contrast due to ELIZABETH.   Will obtain a renal US.   Invanz should cover the potential additional urinary source.    Will obtain a noncontrast CTT head.  Aspiration precautions.   Enhanced supervision.   Would consider formal neurology evaluation in the AM.    Will obtain an echo and would clarify with PCP patient's Jardiance, presumably for history of undifferentiated CHF.    Presently patient is not in decompensated CHF.   Would consider formal cardiology evaluation in the AM.    Will provide FS S/S for now to monitor for excess hyperglycemia and for hypoglycemia.   Nutrition consult.    Would attempt to clarify from prior records at [facility] workup of involuntary weight loss or recent outpatient workup.    Daughter aware of followup of repeat CTT chest upon discharge as outlined with small undifferentiated pulmonary nodules as above.    Daughter agrees to pharmacologic DVT prophylaxis.  Referral of patient to the ER from the SNF with RUQ pain with CTT and RUQ US evidence of acute cholecystitis, triglycerides nondiagnostic and suspect passed stone in the setting of patient with a complex medical history of a prolonged hospitalization at [facility] ([facility]) with severe septic shock requiring vasopressors, intubation/extubation at the time, with complicating MI and CVA, and COVID-19 at the time.  Patient previously independent in the summer 2022 but apparently since hospitalization has manifested progressive cognitive impairment, and was due to be seen at the SNF by a neurologist and cardiologist on followup.    Seen by general surgery recommended poor candidate for alb choleugenio  recommended percutaneous cholecystoscopy tube. Evaluated by IR who recommended to hold Brillanta for 7 days. Aspirin resumed  by cardiology. Possible procedure on next Thursday for percutaneous cholecystoscopy.     Invanz switched to Zosyn. Continue IV fluid. RUQ tenderness resolved.     Leukocytosis resolving. Continue Zosyn.     NO clear collection on CTT abdomen but limited due to lack of IV contrast due to ELIZABETH.   Will obtain a renal US.   Invanz should cover the potential additional urinary source.    Will obtain a noncontrast CTT head.  Aspiration precautions.   Enhanced supervision.   Would consider formal neurology evaluation in the AM.    Will obtain an echo and would clarify with PCP patient's Jardiance, presumably for history of undifferentiated CHF.    Presently patient is not in decompensated CHF.   Would consider formal cardiology evaluation in the AM.    Will provide FS S/S for now to monitor for excess hyperglycemia and for hypoglycemia.   Nutrition consult.    Would attempt to clarify from prior records at [facility] workup of involuntary weight loss or recent outpatient workup.    Daughter aware of followup of repeat CTT chest upon discharge as outlined with small undifferentiated pulmonary nodules as above.    Daughter agrees to pharmacologic DVT prophylaxis.  Referral of patient to the ER from the SNF with RUQ pain with CTT and RUQ US evidence of acute cholecystitis, triglycerides nondiagnostic and suspect passed stone in the setting of patient with a complex medical history of a prolonged hospitalization at [facility] ([facility]) with severe septic shock requiring vasopressors, intubation/extubation at the time, with complicating MI and CVA, and COVID-19 at the time.  Patient previously independent in the summer 2022 but apparently since hospitalization has manifested progressive cognitive impairment, and was due to be seen at the SNF by a neurologist and cardiologist on followup.    Seen by general surgery recommended poor candidate for alb choleugenio  recommended percutaneous cholecystoscopy tube. Evaluated by IR who recommended to hold Brillanta for 7 days. Aspirin resumed  by cardiology. Possible procedure on next Thursday for percutaneous cholecystoscopy.     Invanz switched to Zosyn. Continue IV fluid. RUQ tenderness resolved.     Leukocytosis resolving. Continue Zosyn.     NO clear collection on CTT abdomen but limited due to lack of IV contrast due to ELIZABETH.   Will obtain a renal US.   Invanz should cover the potential additional urinary source.    Will obtain a noncontrast CTT head.  Aspiration precautions.   Enhanced supervision.   Would consider formal neurology evaluation in the AM.    Will obtain an echo and would clarify with PCP patient's Jardiance, presumably for history of undifferentiated CHF.    Presently patient is not in decompensated CHF.   Would consider formal cardiology evaluation in the AM.    Will provide FS S/S for now to monitor for excess hyperglycemia and for hypoglycemia.   Nutrition consult.    Would attempt to clarify from prior records at [facility] workup of involuntary weight loss or recent outpatient workup.    Daughter aware of followup of repeat CTT chest upon discharge as outlined with small undifferentiated pulmonary nodules as above.    Daughter agrees to pharmacologic DVT prophylaxis.

## 2023-01-23 NOTE — PROGRESS NOTE ADULT - REASON FOR ADMISSION
Sent from [facility] for RUQ abdominal pain since 1/15/23 Sent from [facility] for RUQ abdominal pain since 1/15/23 Sent from [facility] for RUQ abdominal pain since 1/15/23

## 2023-01-24 LAB
ANION GAP SERPL CALC-SCNC: 16 MMOL/L — SIGNIFICANT CHANGE UP (ref 5–17)
BUN SERPL-MCNC: 12 MG/DL — SIGNIFICANT CHANGE UP (ref 7–23)
CALCIUM SERPL-MCNC: 8.9 MG/DL — SIGNIFICANT CHANGE UP (ref 8.4–10.5)
CHLORIDE SERPL-SCNC: 102 MMOL/L — SIGNIFICANT CHANGE UP (ref 96–108)
CO2 SERPL-SCNC: 20 MMOL/L — LOW (ref 22–31)
CREAT SERPL-MCNC: 0.91 MG/DL — SIGNIFICANT CHANGE UP (ref 0.5–1.3)
CULTURE RESULTS: SIGNIFICANT CHANGE UP
EGFR: 63 ML/MIN/1.73M2 — SIGNIFICANT CHANGE UP
GLUCOSE BLDC GLUCOMTR-MCNC: 100 MG/DL — HIGH (ref 70–99)
GLUCOSE BLDC GLUCOMTR-MCNC: 112 MG/DL — HIGH (ref 70–99)
GLUCOSE BLDC GLUCOMTR-MCNC: 119 MG/DL — HIGH (ref 70–99)
GLUCOSE BLDC GLUCOMTR-MCNC: 98 MG/DL — SIGNIFICANT CHANGE UP (ref 70–99)
GLUCOSE SERPL-MCNC: 103 MG/DL — HIGH (ref 70–99)
HCT VFR BLD CALC: 37.2 % — SIGNIFICANT CHANGE UP (ref 34.5–45)
HGB BLD-MCNC: 11.6 G/DL — SIGNIFICANT CHANGE UP (ref 11.5–15.5)
MCHC RBC-ENTMCNC: 28.9 PG — SIGNIFICANT CHANGE UP (ref 27–34)
MCHC RBC-ENTMCNC: 31.2 GM/DL — LOW (ref 32–36)
MCV RBC AUTO: 92.8 FL — SIGNIFICANT CHANGE UP (ref 80–100)
NRBC # BLD: 0 /100 WBCS — SIGNIFICANT CHANGE UP (ref 0–0)
PLATELET # BLD AUTO: 316 K/UL — SIGNIFICANT CHANGE UP (ref 150–400)
POTASSIUM SERPL-MCNC: 3.7 MMOL/L — SIGNIFICANT CHANGE UP (ref 3.5–5.3)
POTASSIUM SERPL-SCNC: 3.7 MMOL/L — SIGNIFICANT CHANGE UP (ref 3.5–5.3)
RBC # BLD: 4.01 M/UL — SIGNIFICANT CHANGE UP (ref 3.8–5.2)
RBC # FLD: 15.6 % — HIGH (ref 10.3–14.5)
SODIUM SERPL-SCNC: 138 MMOL/L — SIGNIFICANT CHANGE UP (ref 135–145)
SPECIMEN SOURCE: SIGNIFICANT CHANGE UP
WBC # BLD: 8.11 K/UL — SIGNIFICANT CHANGE UP (ref 3.8–10.5)
WBC # FLD AUTO: 8.11 K/UL — SIGNIFICANT CHANGE UP (ref 3.8–10.5)

## 2023-01-24 PROCEDURE — 99232 SBSQ HOSP IP/OBS MODERATE 35: CPT

## 2023-01-24 RX ORDER — TICAGRELOR 90 MG/1
90 TABLET ORAL EVERY 12 HOURS
Refills: 0 | Status: DISCONTINUED | OUTPATIENT
Start: 2023-01-24 | End: 2023-01-25

## 2023-01-24 RX ORDER — METOPROLOL TARTRATE 50 MG
25 TABLET ORAL DAILY
Refills: 0 | Status: DISCONTINUED | OUTPATIENT
Start: 2023-01-24 | End: 2023-01-25

## 2023-01-24 RX ADMIN — Medication 25 MILLIGRAM(S): at 06:17

## 2023-01-24 RX ADMIN — PIPERACILLIN AND TAZOBACTAM 25 GRAM(S): 4; .5 INJECTION, POWDER, LYOPHILIZED, FOR SOLUTION INTRAVENOUS at 05:19

## 2023-01-24 RX ADMIN — HEPARIN SODIUM 5000 UNIT(S): 5000 INJECTION INTRAVENOUS; SUBCUTANEOUS at 23:14

## 2023-01-24 RX ADMIN — PIPERACILLIN AND TAZOBACTAM 25 GRAM(S): 4; .5 INJECTION, POWDER, LYOPHILIZED, FOR SOLUTION INTRAVENOUS at 13:39

## 2023-01-24 RX ADMIN — Medication 0: at 08:40

## 2023-01-24 RX ADMIN — HEPARIN SODIUM 5000 UNIT(S): 5000 INJECTION INTRAVENOUS; SUBCUTANEOUS at 13:38

## 2023-01-24 RX ADMIN — PIPERACILLIN AND TAZOBACTAM 25 GRAM(S): 4; .5 INJECTION, POWDER, LYOPHILIZED, FOR SOLUTION INTRAVENOUS at 22:35

## 2023-01-24 RX ADMIN — Medication 0: at 18:03

## 2023-01-24 RX ADMIN — Medication 0: at 12:28

## 2023-01-24 RX ADMIN — Medication 81 MILLIGRAM(S): at 13:38

## 2023-01-24 RX ADMIN — TICAGRELOR 90 MILLIGRAM(S): 90 TABLET ORAL at 20:47

## 2023-01-24 RX ADMIN — CHLORHEXIDINE GLUCONATE 1 APPLICATION(S): 213 SOLUTION TOPICAL at 05:20

## 2023-01-24 RX ADMIN — Medication 25 MICROGRAM(S): at 22:34

## 2023-01-24 NOTE — PROGRESS NOTE ADULT - SUBJECTIVE AND OBJECTIVE BOX
CARDIOLOGY     PROGRESS  NOTE   ________________________________________________    CHIEF COMPLAINT:Patient is a 82y old  Female who presents with a chief complaint of Sent from [facility] for RUQ abdominal pain since 1/15/23 (23 Jan 2023 18:05)  no complain  	  REVIEW OF SYSTEMS:  CONSTITUTIONAL: No fever, weight loss, or fatigue  EYES: No eye pain, visual disturbances, or discharge  ENT:  No difficulty hearing, tinnitus, vertigo; No sinus or throat pain  NECK: No pain or stiffness  RESPIRATORY: No cough, wheezing, chills or hemoptysis; +hortness of Breath  CARDIOVASCULAR: No chest pain, palpitations, passing out, dizziness, or leg swelling  GASTROINTESTINAL: No abdominal or epigastric pain. No nausea, vomiting, or hematemesis; No diarrhea or constipation. No melena or hematochezia.  GENITOURINARY: No dysuria, frequency, hematuria, or incontinence  NEUROLOGICAL: No headaches, memory loss, loss of strength, numbness, or tremors  SKIN: No itching, burning, rashes, or lesions   LYMPH Nodes: No enlarged glands  ENDOCRINE: No heat or cold intolerance; No hair loss  MUSCULOSKELETAL: No joint pain or swelling; No muscle, back, or extremity pain  PSYCHIATRIC: No depression, anxiety, mood swings, or difficulty sleeping  HEME/LYMPH: No easy bruising, or bleeding gums  ALLERGY AND IMMUNOLOGIC: No hives or eczema	    [x] All others negative	  [ ] Unable to obtain    PHYSICAL EXAM:  T(C): 36.4 (01-24-23 @ 05:08), Max: 37.1 (01-23-23 @ 06:51)  HR: 84 (01-24-23 @ 05:08) (84 - 89)  BP: 136/75 (01-24-23 @ 05:08) (114/68 - 136/75)  RR: 17 (01-24-23 @ 05:08) (17 - 18)  SpO2: 97% (01-24-23 @ 05:08) (95% - 97%)  Wt(kg): --  I&O's Summary    22 Jan 2023 07:01  -  23 Jan 2023 07:00  --------------------------------------------------------  IN: 480 mL / OUT: 800 mL / NET: -320 mL    23 Jan 2023 07:01  -  24 Jan 2023 05:57  --------------------------------------------------------  IN: 520 mL / OUT: 1000 mL / NET: -480 mL        Appearance: Normal	  HEENT:   Normal oral mucosa, PERRL, EOMI	  Lymphatic: No lymphadenopathy  Cardiovascular: Normal S1 S2, No JVD, +murmurs, No edema  Respiratory:rhonchiatry: A & O x 3, Mood & affect appropriate  Gastrointestinal:  Soft, Non-tender, + BS	  Skin: No rashes, No ecchymoses, No cyanosis	  Neurologic: Non-focal  Extremities: Normal range of motion, No clubbing, cyanosis or edema  Vascular: Peripheral pulses palpable 2+ bilaterally    MEDICATIONS  (STANDING):  aspirin enteric coated 81 milliGRAM(s) Oral daily  chlorhexidine 2% Cloths 1 Application(s) Topical <User Schedule>  dextrose 5%. 1000 milliLiter(s) (100 mL/Hr) IV Continuous <Continuous>  dextrose 5%. 1000 milliLiter(s) (50 mL/Hr) IV Continuous <Continuous>  dextrose 50% Injectable 25 Gram(s) IV Push once  dextrose 50% Injectable 12.5 Gram(s) IV Push once  dextrose 50% Injectable 25 Gram(s) IV Push once  glucagon  Injectable 1 milliGRAM(s) IntraMuscular once  heparin   Injectable 5000 Unit(s) SubCutaneous <User Schedule>  insulin lispro (ADMELOG) corrective regimen sliding scale   SubCutaneous three times a day before meals  insulin lispro (ADMELOG) corrective regimen sliding scale   SubCutaneous at bedtime  levothyroxine Injectable 25 MICROGram(s) IV Push at bedtime  piperacillin/tazobactam IVPB.. 3.375 Gram(s) IV Intermittent every 8 hours  sodium chloride 0.9%. 1000 milliLiter(s) (70 mL/Hr) IV Continuous <Continuous>      TELEMETRY: 	    ECG:  	  RADIOLOGY:  OTHER: 	  	  LABS:	 	    CARDIAC MARKERS:                                11.1   9.51  )-----------( 340      ( 23 Jan 2023 11:53 )             35.3     01-23    137  |  101  |  13  ----------------------------<  128<H>  3.6   |  20<L>  |  1.01    Ca    8.9      23 Jan 2023 11:53    TPro  6.6  /  Alb  2.6<L>  /  TBili  0.5  /  DBili  x   /  AST  21  /  ALT  9<L>  /  AlkPhos  139<H>  01-23    proBNP:   Lipid Profile: Cholesterol --  LDL --  HDL --      HgA1c:   TSH: Thyroid Stimulating Hormone, Serum: 4.62 uIU/mL (01-19 @ 07:45)      < from: 12 Lead ECG (01.18.23 @ 17:06) >  Diagnosis Line NORMAL SINUS RHYTHM  NONSPECIFIC ST AND T WAVE ABNORMALITY    ABNORMAL ECG        Assessment and plan  ---------------------------   81 y/o F--history from patient not reliable and obtained from patient's adult daughter above by me--patient with a history of an apparently complicated course of hospitalization at [facility]  in Nov 2022 with apparently severe septic shock requiring vasopressors from a complicated pyelonephritis RIGHT with hypotension and respiratory failure S/P intubation /extubation, COVID-19 + at the time (patient COVID-19 vaccinated x 3), with apparent MI at the time and 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  Daughter reports that the patient was discharged to [facility] with patient with impaired functional status following hospitalization, along with development of cognitive impairment.  Unclear if patient had a perinephric abscess at the time with drainage (?).   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 Wale Huffman at [facility] in Mar 2016 (I reviewed [facility] Office notes).    Patient now sent from the SNF following apparently 4 days of  RUQ and RIGHT flank pain with poor PO, nausea.  No chest pain/pressure.  NO fever, no chills, no rigors.  No red blood per rectum or melena.  No diaphoresis.   pt with hx of htn, ashd, cva, pt had stent in 2020, pt apparently had increase trop and was started empirically on Brilanta?? since november  if pt needs surgery or intervention may hold Brilanta, will discuss with her cardiologist Dr blum   will adjust cardiac meds  continue asa 81 mg daily  continue abx  beta blocker as tolerated  spoke to her daughter   GI eval  will plan for further cardiac michael  discussed with family last stent 3 years ago, pt was started on Brilanta on the last admission with no cardiac michael sec to increase trop  awaiting echo  will consider ischemia michael prior to dc  awaiting cynthia tube on Thursday  add metoprolol er 25 mg daily    	                    CARDIOLOGY     PROGRESS  NOTE   ________________________________________________    CHIEF COMPLAINT:Patient is a 82y old  Female who presents with a chief complaint of Sent from [facility] for RUQ abdominal pain since 1/15/23 (23 Jan 2023 18:05)  no complain  	  REVIEW OF SYSTEMS:  CONSTITUTIONAL: No fever, weight loss, or fatigue  EYES: No eye pain, visual disturbances, or discharge  ENT:  No difficulty hearing, tinnitus, vertigo; No sinus or throat pain  NECK: No pain or stiffness  RESPIRATORY: No cough, wheezing, chills or hemoptysis; +hortness of Breath  CARDIOVASCULAR: No chest pain, palpitations, passing out, dizziness, or leg swelling  GASTROINTESTINAL: No abdominal or epigastric pain. No nausea, vomiting, or hematemesis; No diarrhea or constipation. No melena or hematochezia.  GENITOURINARY: No dysuria, frequency, hematuria, or incontinence  NEUROLOGICAL: No headaches, memory loss, loss of strength, numbness, or tremors  SKIN: No itching, burning, rashes, or lesions   LYMPH Nodes: No enlarged glands  ENDOCRINE: No heat or cold intolerance; No hair loss  MUSCULOSKELETAL: No joint pain or swelling; No muscle, back, or extremity pain  PSYCHIATRIC: No depression, anxiety, mood swings, or difficulty sleeping  HEME/LYMPH: No easy bruising, or bleeding gums  ALLERGY AND IMMUNOLOGIC: No hives or eczema	    [x] All others negative	  [ ] Unable to obtain    PHYSICAL EXAM:  T(C): 36.4 (01-24-23 @ 05:08), Max: 37.1 (01-23-23 @ 06:51)  HR: 84 (01-24-23 @ 05:08) (84 - 89)  BP: 136/75 (01-24-23 @ 05:08) (114/68 - 136/75)  RR: 17 (01-24-23 @ 05:08) (17 - 18)  SpO2: 97% (01-24-23 @ 05:08) (95% - 97%)  Wt(kg): --  I&O's Summary    22 Jan 2023 07:01  -  23 Jan 2023 07:00  --------------------------------------------------------  IN: 480 mL / OUT: 800 mL / NET: -320 mL    23 Jan 2023 07:01  -  24 Jan 2023 05:57  --------------------------------------------------------  IN: 520 mL / OUT: 1000 mL / NET: -480 mL        Appearance: Normal	  HEENT:   Normal oral mucosa, PERRL, EOMI	  Lymphatic: No lymphadenopathy  Cardiovascular: Normal S1 S2, No JVD, +murmurs, No edema  Respiratory:rhonchiatry: A & O x 3, Mood & affect appropriate  Gastrointestinal:  Soft, Non-tender, + BS	  Skin: No rashes, No ecchymoses, No cyanosis	  Neurologic: Non-focal  Extremities: Normal range of motion, No clubbing, cyanosis or edema  Vascular: Peripheral pulses palpable 2+ bilaterally    MEDICATIONS  (STANDING):  aspirin enteric coated 81 milliGRAM(s) Oral daily  chlorhexidine 2% Cloths 1 Application(s) Topical <User Schedule>  dextrose 5%. 1000 milliLiter(s) (100 mL/Hr) IV Continuous <Continuous>  dextrose 5%. 1000 milliLiter(s) (50 mL/Hr) IV Continuous <Continuous>  dextrose 50% Injectable 25 Gram(s) IV Push once  dextrose 50% Injectable 12.5 Gram(s) IV Push once  dextrose 50% Injectable 25 Gram(s) IV Push once  glucagon  Injectable 1 milliGRAM(s) IntraMuscular once  heparin   Injectable 5000 Unit(s) SubCutaneous <User Schedule>  insulin lispro (ADMELOG) corrective regimen sliding scale   SubCutaneous three times a day before meals  insulin lispro (ADMELOG) corrective regimen sliding scale   SubCutaneous at bedtime  levothyroxine Injectable 25 MICROGram(s) IV Push at bedtime  piperacillin/tazobactam IVPB.. 3.375 Gram(s) IV Intermittent every 8 hours  sodium chloride 0.9%. 1000 milliLiter(s) (70 mL/Hr) IV Continuous <Continuous>      TELEMETRY: 	    ECG:  	  RADIOLOGY:  OTHER: 	  	  LABS:	 	    CARDIAC MARKERS:                                11.1   9.51  )-----------( 340      ( 23 Jan 2023 11:53 )             35.3     01-23    137  |  101  |  13  ----------------------------<  128<H>  3.6   |  20<L>  |  1.01    Ca    8.9      23 Jan 2023 11:53    TPro  6.6  /  Alb  2.6<L>  /  TBili  0.5  /  DBili  x   /  AST  21  /  ALT  9<L>  /  AlkPhos  139<H>  01-23    proBNP:   Lipid Profile: Cholesterol --  LDL --  HDL --      HgA1c:   TSH: Thyroid Stimulating Hormone, Serum: 4.62 uIU/mL (01-19 @ 07:45)      < from: 12 Lead ECG (01.18.23 @ 17:06) >  Diagnosis Line NORMAL SINUS RHYTHM  NONSPECIFIC ST AND T WAVE ABNORMALITY    ABNORMAL ECG        Assessment and plan  ---------------------------   81 y/o F--history from patient not reliable and obtained from patient's adult daughter above by me--patient with a history of an apparently complicated course of hospitalization at [facility]  in Nov 2022 with apparently severe septic shock requiring vasopressors from a complicated pyelonephritis RIGHT with hypotension and respiratory failure S/P intubation /extubation, COVID-19 + at the time (patient COVID-19 vaccinated x 3), with apparent MI at the time and 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  Daughter reports that the patient was discharged to [facility] with patient with impaired functional status following hospitalization, along with development of cognitive impairment.  Unclear if patient had a perinephric abscess at the time with drainage (?).   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 Wale Huffman at [facility] in Mar 2016 (I reviewed [facility] Office notes).    Patient now sent from the SNF following apparently 4 days of  RUQ and RIGHT flank pain with poor PO, nausea.  No chest pain/pressure.  NO fever, no chills, no rigors.  No red blood per rectum or melena.  No diaphoresis.   pt with hx of htn, ashd, cva, pt had stent in 2020, pt apparently had increase trop and was started empirically on Brilanta?? since november  if pt needs surgery or intervention may hold Brilanta, will discuss with her cardiologist Dr blum   will adjust cardiac meds  continue asa 81 mg daily  continue abx  beta blocker as tolerated  spoke to her daughter   GI eval  will plan for further cardiac michael  discussed with family last stent 3 years ago, pt was started on Brilanta on the last admission with no cardiac michael sec to increase trop  awaiting echo  will consider ischemia michael prior to dc  awaiting cynthia tube on Thursday  add metoprolol er 25 mg daily    	                    CARDIOLOGY     PROGRESS  NOTE   ________________________________________________    CHIEF COMPLAINT:Patient is a 82y old  Female who presents with a chief complaint of Sent from [facility] for RUQ abdominal pain since 1/15/23 (23 Jan 2023 18:05)  no complain  	  REVIEW OF SYSTEMS:  CONSTITUTIONAL: No fever, weight loss, or fatigue  EYES: No eye pain, visual disturbances, or discharge  ENT:  No difficulty hearing, tinnitus, vertigo; No sinus or throat pain  NECK: No pain or stiffness  RESPIRATORY: No cough, wheezing, chills or hemoptysis; +hortness of Breath  CARDIOVASCULAR: No chest pain, palpitations, passing out, dizziness, or leg swelling  GASTROINTESTINAL: No abdominal or epigastric pain. No nausea, vomiting, or hematemesis; No diarrhea or constipation. No melena or hematochezia.  GENITOURINARY: No dysuria, frequency, hematuria, or incontinence  NEUROLOGICAL: No headaches, memory loss, loss of strength, numbness, or tremors  SKIN: No itching, burning, rashes, or lesions   LYMPH Nodes: No enlarged glands  ENDOCRINE: No heat or cold intolerance; No hair loss  MUSCULOSKELETAL: No joint pain or swelling; No muscle, back, or extremity pain  PSYCHIATRIC: No depression, anxiety, mood swings, or difficulty sleeping  HEME/LYMPH: No easy bruising, or bleeding gums  ALLERGY AND IMMUNOLOGIC: No hives or eczema	    [x] All others negative	  [ ] Unable to obtain    PHYSICAL EXAM:  T(C): 36.4 (01-24-23 @ 05:08), Max: 37.1 (01-23-23 @ 06:51)  HR: 84 (01-24-23 @ 05:08) (84 - 89)  BP: 136/75 (01-24-23 @ 05:08) (114/68 - 136/75)  RR: 17 (01-24-23 @ 05:08) (17 - 18)  SpO2: 97% (01-24-23 @ 05:08) (95% - 97%)  Wt(kg): --  I&O's Summary    22 Jan 2023 07:01  -  23 Jan 2023 07:00  --------------------------------------------------------  IN: 480 mL / OUT: 800 mL / NET: -320 mL    23 Jan 2023 07:01  -  24 Jan 2023 05:57  --------------------------------------------------------  IN: 520 mL / OUT: 1000 mL / NET: -480 mL        Appearance: Normal	  HEENT:   Normal oral mucosa, PERRL, EOMI	  Lymphatic: No lymphadenopathy  Cardiovascular: Normal S1 S2, No JVD, +murmurs, No edema  Respiratory:rhonchiatry: A & O x 3, Mood & affect appropriate  Gastrointestinal:  Soft, Non-tender, + BS	  Skin: No rashes, No ecchymoses, No cyanosis	  Neurologic: Non-focal  Extremities: Normal range of motion, No clubbing, cyanosis or edema  Vascular: Peripheral pulses palpable 2+ bilaterally    MEDICATIONS  (STANDING):  aspirin enteric coated 81 milliGRAM(s) Oral daily  chlorhexidine 2% Cloths 1 Application(s) Topical <User Schedule>  dextrose 5%. 1000 milliLiter(s) (100 mL/Hr) IV Continuous <Continuous>  dextrose 5%. 1000 milliLiter(s) (50 mL/Hr) IV Continuous <Continuous>  dextrose 50% Injectable 25 Gram(s) IV Push once  dextrose 50% Injectable 12.5 Gram(s) IV Push once  dextrose 50% Injectable 25 Gram(s) IV Push once  glucagon  Injectable 1 milliGRAM(s) IntraMuscular once  heparin   Injectable 5000 Unit(s) SubCutaneous <User Schedule>  insulin lispro (ADMELOG) corrective regimen sliding scale   SubCutaneous three times a day before meals  insulin lispro (ADMELOG) corrective regimen sliding scale   SubCutaneous at bedtime  levothyroxine Injectable 25 MICROGram(s) IV Push at bedtime  piperacillin/tazobactam IVPB.. 3.375 Gram(s) IV Intermittent every 8 hours  sodium chloride 0.9%. 1000 milliLiter(s) (70 mL/Hr) IV Continuous <Continuous>      TELEMETRY: 	    ECG:  	  RADIOLOGY:  OTHER: 	  	  LABS:	 	    CARDIAC MARKERS:                                11.1   9.51  )-----------( 340      ( 23 Jan 2023 11:53 )             35.3     01-23    137  |  101  |  13  ----------------------------<  128<H>  3.6   |  20<L>  |  1.01    Ca    8.9      23 Jan 2023 11:53    TPro  6.6  /  Alb  2.6<L>  /  TBili  0.5  /  DBili  x   /  AST  21  /  ALT  9<L>  /  AlkPhos  139<H>  01-23    proBNP:   Lipid Profile: Cholesterol --  LDL --  HDL --      HgA1c:   TSH: Thyroid Stimulating Hormone, Serum: 4.62 uIU/mL (01-19 @ 07:45)      < from: 12 Lead ECG (01.18.23 @ 17:06) >  Diagnosis Line NORMAL SINUS RHYTHM  NONSPECIFIC ST AND T WAVE ABNORMALITY    ABNORMAL ECG        Assessment and plan  ---------------------------   83 y/o F--history from patient not reliable and obtained from patient's adult daughter above by me--patient with a history of an apparently complicated course of hospitalization at [facility]  in Nov 2022 with apparently severe septic shock requiring vasopressors from a complicated pyelonephritis RIGHT with hypotension and respiratory failure S/P intubation /extubation, COVID-19 + at the time (patient COVID-19 vaccinated x 3), with apparent MI at the time and 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  Daughter reports that the patient was discharged to [facility] with patient with impaired functional status following hospitalization, along with development of cognitive impairment.  Unclear if patient had a perinephric abscess at the time with drainage (?).   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 Wale Huffman at [facility] in Mar 2016 (I reviewed [facility]IE Office notes).    Patient now sent from the SNF following apparently 4 days of  RUQ and RIGHT flank pain with poor PO, nausea.  No chest pain/pressure.  NO fever, no chills, no rigors.  No red blood per rectum or melena.  No diaphoresis.   pt with hx of htn, ashd, cva, pt had stent in 2020, pt apparently had increase trop and was started empirically on Brilanta?? since november  if pt needs surgery or intervention may hold Brilanta, will discuss with her cardiologist Dr blum   will adjust cardiac meds  continue asa 81 mg daily  continue abx  beta blocker as tolerated  spoke to her daughter   GI eval  will plan for further cardiac michael  discussed with family last stent 3 years ago, pt was started on Brilanta on the last admission with no cardiac michael sec to increase trop  awaiting echo  will consider ischemia michael prior to dc  awaiting cynthia tube on Thursday  add metoprolol er 25 mg daily

## 2023-01-24 NOTE — PROGRESS NOTE ADULT - ASSESSMENT
82 f with HTN, CAD, CHF, hospitalization at [facility] 11/2022 for septic shock due to citrobacter bacteremia and R pyelo c/b resp failure, MI and CVA, now p/w RUQ pain and nausea  here afebrile, WBC: 12  abd u/s:  Distended gallbladder and gallbladder wall thickening in the setting of positive sonographic Pond sign compatible with acute cholecystitis. Small right upper quadrant ascites.  abd/pelvis CT:  Gallbladder distention with marked wall thickening and surrounding inflammatory changes/fluid. Probable layering sludge, concerning for acute cholecystitis. Striated appearance of the kidneys, right greater than left likely reflects infection/pyelonephritis.     leukocytosis, RUQ pain due to acute cholecystitis  blood cx negative,  surgery stated no surgical interventions in view pt's comorbidities and IR stated no IR perc cynthia for risk of bleeding   * now pt improved clinically and WBC normalized  * c/w zosyn while in the hospital, will complete a 2 week course and when pt is ready for discharge will switch to augmentin now day 7 of antibiotics  * monitor CBC/diff and LFTs  * pt will need an ultimate surgical intervention for cholecystitis f/u with surgery as outpatient     The above assessment and plan was discussed with the primary team    Daniella Ribeiro MD  contact on teams  After 5pm and on weekends call [phone]       82 f with HTN, CAD, CHF, hospitalization at [facility] 11/2022 for septic shock due to citrobacter bacteremia and R pyelo c/b resp failure, MI and CVA, now p/w RUQ pain and nausea  here afebrile, WBC: 12  abd u/s:  Distended gallbladder and gallbladder wall thickening in the setting of positive sonographic Pond sign compatible with acute cholecystitis. Small right upper quadrant ascites.  abd/pelvis CT:  Gallbladder distention with marked wall thickening and surrounding inflammatory changes/fluid. Probable layering sludge, concerning for acute cholecystitis. Striated appearance of the kidneys, right greater than left likely reflects infection/pyelonephritis.     leukocytosis, RUQ pain due to acute cholecystitis  blood cx negative,  surgery stated no surgical interventions in view pt's comorbidities and IR stated no IR perc cynthia for risk of bleeding   * now pt improved clinically and WBC normalized  * c/w zosyn while in the hospital, will complete a 2 week course and when pt is ready for discharge will switch to augmentin now day 7 of antibiotics  * monitor CBC/diff and LFTs  * pt will need an ultimate surgical intervention for cholecystitis f/u with surgery as outpatient     The above assessment and plan was discussed with the primary team    Daniella Ribeiro MD  contact on teams  After 5pm and on weekends call [phone]       82 f with HTN, CAD, CHF, hospitalization at [facility] 11/2022 for septic shock due to citrobacter bacteremia and R pyelo c/b resp failure, MI and CVA, now p/w RUQ pain and nausea  here afebrile, WBC: 12  abd u/s:  Distended gallbladder and gallbladder wall thickening in the setting of positive sonographic Pond sign compatible with acute cholecystitis. Small right upper quadrant ascites.  abd/pelvis CT:  Gallbladder distention with marked wall thickening and surrounding inflammatory changes/fluid. Probable layering sludge, concerning for acute cholecystitis. Striated appearance of the kidneys, right greater than left likely reflects infection/pyelonephritis.     leukocytosis, RUQ pain due to acute cholecystitis  blood cx negative,  surgery stated no surgical interventions in view pt's comorbidities and IR stated no IR perc cynthia for risk of bleeding   * now pt improved clinically and WBC normalized  * c/w zosyn while in the hospital, will complete a 2 week course and when pt is ready for discharge will switch to augmentin now day 7 of antibiotics  * monitor CBC/diff and LFTs  * pt will need an ultimate surgical intervention for cholecystitis f/u with surgery as outpatient     The above assessment and plan was discussed with the primary team    Daniella Ribeiro MD  contact on teams  After 5pm and on weekends call [phone]

## 2023-01-24 NOTE — PROGRESS NOTE ADULT - REASON FOR ADMISSION
Sent from [facility] for RUQ abdominal pain since 1/15/23 Sent from [facility] for RUQ abdominal pain since 1/15/23 Sent from [facility] for RUQ abdominal pain since 1/15/23

## 2023-01-24 NOTE — PHYSICAL THERAPY INITIAL EVALUATION ADULT - TRANSFER TRAINING, PT EVAL
GOAL: Patient will perform sit to stand transfers independently with rolling walker in 4 weeks with proper hand placement

## 2023-01-24 NOTE — PHYSICAL THERAPY INITIAL EVALUATION ADULT - LIVES WITH, PROFILE
with caregiver 5 days a week, unclear how many hours per week or day, states she lives in an elevator apartment yet stays in bed most of the day/alone

## 2023-01-24 NOTE — PROGRESS NOTE ADULT - REASON FOR ADMISSION
Sent from [facility] for RUQ abdominal pain since 1/15/23 Sent from [facility] for RUQ abdominal pain since 1/15/23 Sent from [facility] for RUQ abdominal pain since 1/15/23

## 2023-01-24 NOTE — PROGRESS NOTE ADULT - REASON FOR ADMISSION
Sent from [facility] for RUQ abdominal pain since 1/15/23 Sent from [facility] for RUQ abdominal pain since 1/15/23 Sent from [facility] for RUQ abdominal pain since 1/15/23

## 2023-01-24 NOTE — PROGRESS NOTE ADULT - ASSESSMENT
82F PMHx early dementia, CHF, hypothyroidism, anxiety, HTN, HLD, vertigo, DM, and gastritis with recent hospitalization 11/2022 at [facility] for urosepsis (fungal) requiring intubation. 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 Discharged 12/5 and at a rehab facility. Daughter reports pt's functional capacity declined severely since hospitalization. She now uses a wheelchair and has lost weight with poor appetite. Pt's care has been at [facility], but her PCP in rehab Dr. Santos recommended she come to [facility]. Pt has worsening r-sided abd pain since Sunday. Imaging suggests acute cholecystitis.    RECOMMENDATIONS:  - No acute surgical intervention given pt comorbidities  - patient improving on conservative management with IV abx   - Adv diet as tolerated   - Recommend po abx upon discharge   - Please call us with questions     [facility]  [id number] 82F PMHx early dementia, CHF, hypothyroidism, anxiety, HTN, HLD, vertigo, DM, and gastritis with recent hospitalization 11/2022 at [facility] for urosepsis (fungal) requiring intubation. 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 Discharged 12/5 and at a rehab facility. Daughter reports pt's functional capacity declined severely since hospitalization. She now uses a wheelchair and has lost weight with poor appetite. Pt's care has been at [facility], but her PCP in rehab Dr. Santos recommended she come to [facility]. Pt has worsening r-sided abd pain since Sunday. Imaging suggests acute cholecystitis.    RECOMMENDATIONS:  - No acute surgical intervention given pt comorbidities  - patient improving on conservative management with IV abx   - Adv diet as tolerated   - Recommend po abx upon discharge   - Please call us with questions     [facility]  [id number] PMHx early dementia, CHF, hypothyroidism, anxiety, HTN, HLD, vertigo, DM, and gastritis with recent hospitalization 11/2022 at [facility] for urosepsis (fungal) requiring intubation. 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 Discharged 12/5 and at a rehab facility. Daughter reports pt's functional capacity declined severely since hospitalization. She now uses a wheelchair and has lost weight with poor appetite. Pt's care has been at [facility], but her PCP in rehab Dr. Santos recommended she come to [facility]. Pt has worsening r-sided abd pain since Sunday. Imaging suggests acute cholecystitis.    RECOMMENDATIONS:  - No acute surgical intervention given pt comorbidities  - patient improving on conservative management with IV abx   - Adv diet as tolerated   - Recommend po abx upon discharge   - Please call us with questions     [facility]  [id number]

## 2023-01-24 NOTE — PROGRESS NOTE ADULT - SUBJECTIVE AND OBJECTIVE BOX
Follow Up:  acute cholecystitis     Interval History/ROS: no fever and WBC normalized, improved abd pain, no cough IR stated no perc cynthia and surgery OK with discharge on PO antibiotics            Allergies  No Known Allergies        ANTIMICROBIALS:  piperacillin/tazobactam IVPB.. 3.375 every 8 hours      OTHER MEDS:  aspirin enteric coated 81 milliGRAM(s) Oral daily  chlorhexidine 2% Cloths 1 Application(s) Topical <User Schedule>  dextrose 5%. 1000 milliLiter(s) IV Continuous <Continuous>  dextrose 5%. 1000 milliLiter(s) IV Continuous <Continuous>  dextrose 50% Injectable 25 Gram(s) IV Push once  dextrose 50% Injectable 12.5 Gram(s) IV Push once  dextrose 50% Injectable 25 Gram(s) IV Push once  dextrose Oral Gel 15 Gram(s) Oral once PRN  glucagon  Injectable 1 milliGRAM(s) IntraMuscular once  heparin   Injectable 5000 Unit(s) SubCutaneous <User Schedule>  insulin lispro (ADMELOG) corrective regimen sliding scale   SubCutaneous three times a day before meals  insulin lispro (ADMELOG) corrective regimen sliding scale   SubCutaneous at bedtime  levothyroxine Injectable 25 MICROGram(s) IV Push at bedtime  metoprolol succinate ER 25 milliGRAM(s) Oral daily  sodium chloride 0.9%. 1000 milliLiter(s) IV Continuous <Continuous>  ticagrelor 90 milliGRAM(s) Oral every 12 hours      Vital Signs Last 24 Hrs  T(C): 36.4 (24 Jan 2023 10:33), Max: 36.8 (24 Jan 2023 09:36)  T(F): 97.6 (24 Jan 2023 10:33), Max: 98.2 (24 Jan 2023 09:36)  HR: 80 (24 Jan 2023 10:33) (80 - 85)  BP: 107/67 (24 Jan 2023 10:33) (107/67 - 147/83)  BP(mean): --  RR: 17 (24 Jan 2023 10:33) (17 - 18)  SpO2: 96% (24 Jan 2023 10:33) (96% - 97%)    Parameters below as of 24 Jan 2023 10:33  Patient On (Oxygen Delivery Method): room air        Physical Exam:  General:    NAD, non toxic  Cardio:    regular S1,S2, + murmur  Respiratory:   clear b/l, no wheezing  abd:   soft, BS +, RUQ tenderness  :     no CVAT, no suprapubic tenderness, no scott  Musculoskeletal : no joint swelling, no edema  Skin:    no rash  vascular: no phlebitis                        11.6   8.11  )-----------( 316      ( 24 Jan 2023 07:01 )             37.2       01-24    138  |  102  |  12  ----------------------------<  103<H>  3.7   |  20<L>  |  0.91    Ca    8.9      24 Jan 2023 06:59    TPro  6.6  /  Alb  2.6<L>  /  TBili  0.5  /  DBili  x   /  AST  21  /  ALT  9<L>  /  AlkPhos  139<H>  01-23          MICROBIOLOGY:  v  .Blood Blood-Venous  01-19-23   No Growth Final  --  --                RADIOLOGY:  Images independently visualized and reviewed personally, findings as below  < from: US Renal (01.19.23 @ 15:26) >  IMPRESSION:  No perinephric fluid collection. Trace perihepatic fluid is noted.    Right renal upper pole cyst.    < end of copied text >  < from: CT Abdomen and Pelvis No Cont (01.18.23 @ 19:40) >  IMPRESSION:    Limited noncontrast study.    Gallbladder distention with marked wall thickening and surrounding   inflammatory changes/fluid. Probable layering sludge. Findings are   concerning for acute cholecystitis. Correlate with same day ultrasound.    Retained contrast of bilateral renal parenchyma likelyreflects degree of   renal dysfunction/nephropathy, correlate for date of contrast   administration. Striated appearance of the kidneys, right greater than   left likely reflects infection/pyelonephritis.    Severe anterior wedging deformity of L3 vertebral body, favored to be   chronic. Correlate with clinical symptoms.    3 mm right middle lobe nodule can be followed dedicated chest CT in 12   months, or as per patient risk factors.    Additional findings as above. Please read above.    < end of copied text >

## 2023-01-24 NOTE — PROGRESS NOTE ADULT - SUBJECTIVE AND OBJECTIVE BOX
Patient is a 82y old  Female who presents with a chief complaint of [facility] abdominal pain since 1/15/23 (24 Jan 2023 16:49)      INTERVAL HPI/OVERNIGHT EVENTS: stable, no pain no tenderness, no need IR procedure to perc choly, clear for discharge at am on po Abx.     Pain Location & Control: ok     MEDICATIONS  (STANDING):  aspirin enteric coated 81 milliGRAM(s) Oral daily  chlorhexidine 2% Cloths 1 Application(s) Topical <User Schedule>  dextrose 5%. 1000 milliLiter(s) (100 mL/Hr) IV Continuous <Continuous>  dextrose 5%. 1000 milliLiter(s) (50 mL/Hr) IV Continuous <Continuous>  dextrose 50% Injectable 25 Gram(s) IV Push once  dextrose 50% Injectable 12.5 Gram(s) IV Push once  dextrose 50% Injectable 25 Gram(s) IV Push once  glucagon  Injectable 1 milliGRAM(s) IntraMuscular once  heparin   Injectable 5000 Unit(s) SubCutaneous <User Schedule>  insulin lispro (ADMELOG) corrective regimen sliding scale   SubCutaneous three times a day before meals  insulin lispro (ADMELOG) corrective regimen sliding scale   SubCutaneous at bedtime  levothyroxine Injectable 25 MICROGram(s) IV Push at bedtime  metoprolol succinate ER 25 milliGRAM(s) Oral daily  piperacillin/tazobactam IVPB.. 3.375 Gram(s) IV Intermittent every 8 hours  sodium chloride 0.9%. 1000 milliLiter(s) (70 mL/Hr) IV Continuous <Continuous>  ticagrelor 90 milliGRAM(s) Oral every 12 hours    MEDICATIONS  (PRN):  dextrose Oral Gel 15 Gram(s) Oral once PRN Blood Glucose LESS THAN 70 milliGRAM(s)/deciliter      Allergies    No Known Allergies    Intolerances        REVIEW OF SYSTEMS:  CONSTITUTIONAL: No fever, weight loss, or fatigue  EYES: No eye pain, visual disturbances, or discharge  ENMT:  No difficulty hearing, tinnitus, vertigo; No sinus or throat pain  NECK: No pain or stiffness  BREASTS: No pain, masses, or nipple discharge  RESPIRATORY: No cough, wheezing, chills or hemoptysis; No shortness of breath  CARDIOVASCULAR: No chest pain, palpitations, dizziness, or leg swelling  GASTROINTESTINAL: No abdominal or epigastric pain. No nausea, vomiting, or hematemesis; No diarrhea or constipation. No melena or hematochezia.  GENITOURINARY: No dysuria, frequency, hematuria, or incontinence  NEUROLOGICAL: No headaches, memory loss, loss of strength, numbness, or tremors  SKIN: No itching, burning, rashes, or lesions   LYMPH NODES: No enlarged glands  ENDOCRINE: No heat or cold intolerance; No hair loss; No polydipsia or polyuria  MUSCULOSKELETAL: No back pain  PSYCHIATRIC: No depression, anxiety, mood swings, or difficulty sleeping  HEME/LYMPH: No easy bruising, or bleeding gums  ALLERGY AND IMMUNOLOGIC: No hives or eczema    Vital Signs Last 24 Hrs  T(C): 36.4 (24 Jan 2023 10:33), Max: 36.8 (24 Jan 2023 09:36)  T(F): 97.6 (24 Jan 2023 10:33), Max: 98.2 (24 Jan 2023 09:36)  HR: 80 (24 Jan 2023 10:33) (80 - 85)  BP: 107/67 (24 Jan 2023 10:33) (107/67 - 147/83)  BP(mean): --  RR: 17 (24 Jan 2023 10:33) (17 - 18)  SpO2: 96% (24 Jan 2023 10:33) (96% - 97%)    Parameters below as of 24 Jan 2023 10:33  Patient On (Oxygen Delivery Method): room air        PHYSICAL EXAM:  GENERAL: NAD, well-groomed, well-developed  HEAD:  Atraumatic, Normocephalic  EYES: EOMI, PERRLA, conjunctiva and sclera clear  ENMT: No tonsillar erythema, exudates, or enlargement; Moist mucous membranes, Good dentition, No lesions  NECK: Supple, No JVD, Normal thyroid  NERVOUS SYSTEM:  Alert & Oriented X3, Good concentration; Motor Strength 5/5 B/L upper and lower extremities; DTRs 2+ intact and symmetric  CHEST/LUNG: Clear to auscultation bilaterally; No rales, rhonchi, wheezing, or rubs  HEART: Regular rate and rhythm; No murmurs, rubs, or gallops  ABDOMEN: Soft, Nontender, Nondistended; Bowel sounds present  EXTREMITIES:  2+ Peripheral Pulses, No clubbing or cyanosis  LYMPH: No lymphadenopathy noted  SKIN: No rashes or lesions  INCISION:  Dressing dry and intact    LABS:                        11.6   8.11  )-----------( 316      ( 24 Jan 2023 07:01 )             37.2     24 Jan 2023 06:59    138    |  102    |  12     ----------------------------<  103    3.7     |  20     |  0.91     Ca    8.9        24 Jan 2023 06:59          CAPILLARY BLOOD GLUCOSE      POCT Blood Glucose.: 119 mg/dL (24 Jan 2023 12:15)  POCT Blood Glucose.: 98 mg/dL (24 Jan 2023 08:26)  POCT Blood Glucose.: 99 mg/dL (23 Jan 2023 22:04)        Cultures  Culture Results:   No Growth Final (01-19-23 @ 05:56)      RADIOLOGY & ADDITIONAL TESTS:    Imaging Personally Reviewed:  [x ] YES  [ ] NO    Consultant(s) Notes Reviewed:  [x ] YES  [ ] NO    Care Discussed with Consultants/Other Providers [x ] YES  [ ] NO

## 2023-01-24 NOTE — PROGRESS NOTE ADULT - PROBLEM SELECTOR PLAN 6
See above.    Would consider clarifying history and recent workup from last hospitalization at [facility]. See above.    Would consider clarifying history and recent workup from last hospitalization at [facility]. See above.    Would consider clarifying history and recent workup from last hospitalization at [facility].

## 2023-01-24 NOTE — PROGRESS NOTE ADULT - SUBJECTIVE AND OBJECTIVE BOX
SUBJECTIVE:   Seen and examined.    OBJECTIVE: T(C): 36.4 (01-24-23 @ 05:08), Max: 36.7 (01-23-23 @ 16:27)  HR: 84 (01-24-23 @ 05:08) (84 - 87)  BP: 136/75 (01-24-23 @ 05:08) (114/68 - 136/75)  RR: 17 (01-24-23 @ 05:08) (17 - 18)  SpO2: 97% (01-24-23 @ 05:08) (95% - 97%)  Wt(kg): --  I&O's Summary    23 Jan 2023 07:01  -  24 Jan 2023 07:00  --------------------------------------------------------  IN: 570 mL / OUT: 1500 mL / NET: -930 mL      I&O's Detail    23 Jan 2023 07:01  -  24 Jan 2023 07:00  --------------------------------------------------------  IN:    Oral Fluid: 570 mL  Total IN: 570 mL    OUT:    Voided (mL): 1500 mL  Total OUT: 1500 mL    Total NET: -930 mL        Physical Exam  General: NAD  Resp: nonlabored   Abdomen: soft, nontender, nondistended     MEDICATIONS  (STANDING):  aspirin enteric coated 81 milliGRAM(s) Oral daily  chlorhexidine 2% Cloths 1 Application(s) Topical <User Schedule>  dextrose 5%. 1000 milliLiter(s) (100 mL/Hr) IV Continuous <Continuous>  dextrose 5%. 1000 milliLiter(s) (50 mL/Hr) IV Continuous <Continuous>  dextrose 50% Injectable 25 Gram(s) IV Push once  dextrose 50% Injectable 12.5 Gram(s) IV Push once  dextrose 50% Injectable 25 Gram(s) IV Push once  glucagon  Injectable 1 milliGRAM(s) IntraMuscular once  heparin   Injectable 5000 Unit(s) SubCutaneous <User Schedule>  insulin lispro (ADMELOG) corrective regimen sliding scale   SubCutaneous three times a day before meals  insulin lispro (ADMELOG) corrective regimen sliding scale   SubCutaneous at bedtime  levothyroxine Injectable 25 MICROGram(s) IV Push at bedtime  metoprolol succinate ER 25 milliGRAM(s) Oral daily  piperacillin/tazobactam IVPB.. 3.375 Gram(s) IV Intermittent every 8 hours  sodium chloride 0.9%. 1000 milliLiter(s) (70 mL/Hr) IV Continuous <Continuous>    MEDICATIONS  (PRN):  dextrose Oral Gel 15 Gram(s) Oral once PRN Blood Glucose LESS THAN 70 milliGRAM(s)/deciliter      LABS:                        11.6   8.11  )-----------( 316      ( 24 Jan 2023 07:01 )             37.2     01-24    138  |  102  |  12  ----------------------------<  103<H>  3.7   |  20<L>  |  0.91    Ca    8.9      24 Jan 2023 06:59    TPro  6.6  /  Alb  2.6<L>  /  TBili  0.5  /  DBili  x   /  AST  21  /  ALT  9<L>  /  AlkPhos  139<H>  01-23

## 2023-01-24 NOTE — PROGRESS NOTE ADULT - ASSESSMENT
Referral of patient to the ER from the SNF with RUQ pain with CTT and RUQ US evidence of acute cholecystitis, triglycerides nondiagnostic and suspect passed stone in the setting of patient with a complex medical history of a prolonged hospitalization at [facility] ([facility]) with severe septic shock requiring vasopressors, intubation/extubation at the time, with complicating MI and CVA, and COVID-19 at the time.  Patient previously independent in the summer 2022 but apparently since hospitalization has manifested progressive cognitive impairment, and was due to be seen at the SNF by a neurologist and cardiologist on followup.    Seen by general surgery recommended poor candidate for alb choleugenio  recommended percutaneous cholecystoscopy tube. Evaluated by IR who recommended to hold Brillanta for 7 days. Aspirin resumed  by cardiology. Possible procedure on next Thursday for percutaneous cholecystoscopy.     Invanz switched to Zosyn. Continue IV fluid. RUQ tenderness resolved.     Leukocytosis resolving. Continue Zosyn.     NO clear collection on CTT abdomen but limited due to lack of IV contrast due to ELIZABETH.   Will obtain a renal US.   Invanz should cover the potential additional urinary source.    Will obtain a noncontrast CTT head.  Aspiration precautions.   Enhanced supervision.   Would consider formal neurology evaluation in the AM.    Will obtain an echo and would clarify with PCP patient's Jardiance, presumably for history of undifferentiated CHF.    Presently patient is not in decompensated CHF.   Would consider formal cardiology evaluation in the AM.    Will provide FS S/S for now to monitor for excess hyperglycemia and for hypoglycemia.   Nutrition consult.    Would attempt to clarify from prior records at [facility] workup of involuntary weight loss or recent outpatient workup.    Daughter aware of followup of repeat CTT chest upon discharge as outlined with small undifferentiated pulmonary nodules as above.    Daughter agrees to pharmacologic DVT prophylaxis.  Referral of patient to the ER from the SNF with RUQ pain with CTT and RUQ US evidence of acute cholecystitis, triglycerides nondiagnostic and suspect passed stone in the setting of patient with a complex medical history of a prolonged hospitalization at [facility] ([facility]) with severe septic shock requiring vasopressors, intubation/extubation at the time, with complicating MI and CVA, and COVID-19 at the time.  Patient previously independent in the summer 2022 but apparently since hospitalization has manifested progressive cognitive impairment, and was due to be seen at the SNF by a neurologist and cardiologist on followup.    Seen by general surgery recommended poor candidate for alb choleugenio  recommended percutaneous cholecystoscopy tube. Evaluated by IR who recommended to hold Brillanta for 7 days. Aspirin resumed  by cardiology. Possible procedure on next Thursday for percutaneous cholecystoscopy.     Invanz switched to Zosyn. Continue IV fluid. RUQ tenderness resolved.     Leukocytosis resolving. Continue Zosyn.     NO clear collection on CTT abdomen but limited due to lack of IV contrast due to ELIZABETH.   Will obtain a renal US.   Invanz should cover the potential additional urinary source.    Will obtain a noncontrast CTT head.  Aspiration precautions.   Enhanced supervision.   Would consider formal neurology evaluation in the AM.    Will obtain an echo and would clarify with PCP patient's Jardiance, presumably for history of undifferentiated CHF.    Presently patient is not in decompensated CHF.   Would consider formal cardiology evaluation in the AM.    Will provide FS S/S for now to monitor for excess hyperglycemia and for hypoglycemia.   Nutrition consult.    Would attempt to clarify from prior records at [facility] workup of involuntary weight loss or recent outpatient workup.    Daughter aware of followup of repeat CTT chest upon discharge as outlined with small undifferentiated pulmonary nodules as above.    Daughter agrees to pharmacologic DVT prophylaxis.  Referral of patient to the ER from the SNF with RUQ pain with CTT and RUQ US evidence of acute cholecystitis, triglycerides nondiagnostic and suspect passed stone in the setting of patient with a complex medical history of a prolonged hospitalization at [facility] ([facility]) with severe septic shock requiring vasopressors, intubation/extubation at the time, with complicating MI and CVA, and COVID-19 at the time.  Patient previously independent in the summer 2022 but apparently since hospitalization has manifested progressive cognitive impairment, and was due to be seen at the SNF by a neurologist and cardiologist on followup.    Seen by general surgery recommended poor candidate for alb choleugenio  recommended percutaneous cholecystoscopy tube. Evaluated by IR who recommended to hold Brillanta for 7 days. Aspirin resumed  by cardiology. Possible procedure on next Thursday for percutaneous cholecystoscopy.     Invanz switched to Zosyn. Continue IV fluid. RUQ tenderness resolved.     Leukocytosis resolving. Continue Zosyn.     NO clear collection on CTT abdomen but limited due to lack of IV contrast due to ELIZABETH.   Will obtain a renal US.   Invanz should cover the potential additional urinary source.    Will obtain a noncontrast CTT head.  Aspiration precautions.   Enhanced supervision.   Would consider formal neurology evaluation in the AM.    Will obtain an echo and would clarify with PCP patient's Jardiance, presumably for history of undifferentiated CHF.    Presently patient is not in decompensated CHF.   Would consider formal cardiology evaluation in the AM.    Will provide FS S/S for now to monitor for excess hyperglycemia and for hypoglycemia.   Nutrition consult.    Would attempt to clarify from prior records at [facility] workup of involuntary weight loss or recent outpatient workup.    Daughter aware of followup of repeat CTT chest upon discharge as outlined with small undifferentiated pulmonary nodules as above.    Daughter agrees to pharmacologic DVT prophylaxis.

## 2023-01-24 NOTE — CHART NOTE - NSCHARTNOTEFT_GEN_A_CORE
Requested by attending to reach out to IR to see if the patient is being considered for a perc cynthia drain.  Discussed case with IR NP Keke Morrell, who states that, since patient does not have abdominal pain, has not had a fever, and has a normal WBC, there is no need for patient to get a perc cynthia drain.  Discussed this conversation with attending, Dr. Munoz, who agrees with this assessment, and requests Brillinta to be restarted.

## 2023-01-24 NOTE — PROGRESS NOTE ADULT - ATTENDING COMMENTS
DATE OF SERVICE: 01-24-23 @ 11:24    Seen and examined. No pain today. Tolerating regular diet    WBC NL    Abdomen soft NT/ND    Ok for DC from surgical perspective on PO course of abx  no plan for surgery at this time  no further inpatient surgical intervention, please recall as needed

## 2023-01-24 NOTE — CHART NOTE - NSCHARTNOTEFT_GEN_A_CORE
Nutrition Follow Up Note  Patient seen for: 1st malnutrition follow up     Chart reviewed, events noted."82F PMHx early dementia, CHF, hypothyroidism, anxiety, HTN, HLD, vertigo, DM, and gastritis with recent hospitalization 11/2022 at [facility] for urosepsis (fungal) requiring intubation. 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 Discharged 12/5 and at a rehab facility. Daughter reports pt's functional capacity declined severely since hospitalization. She now uses a wheelchair and has lost weight with poor appetite. Pt's care has been at [facility], but her PCP in rehab Dr. Santos recommended she come to [facility]. Pt has worsening r-sided abd pain since Sunday. Imaging suggests acute cholecystitis."    Source: [] Patient       [x]  Medical Record        [X] RN/PCA       [] Family at bedside       [] Other:    -If unable to interview patient: [] Trach/Vent/BiPAP  [] Disoriented/confused/inappropriate to interview    Diet Order:   Diet, Regular:   Consistent Carbohydrate {Evening Snack} (CSTCHOSN)  Low Fat (LOWFAT) (01-20-23)    - Is current order appropriate/adequate? [X] Yes  []  No:     - PO intake :   [] >75%  Adequate    [] 50-75%  Fair       [X] <50%  Poor    Nutrition-related concerns:  - Poor PO intakes persists, observed lunch tray with PCA providing assistance during meal, pt consumed <25%  -  Hx of diabetes, however given advanced age (>75 years), and well controlled A1c-6.3% (1/20/23), pt may benefit from diet liberalization  - Previous RD documentation notes need for a mechanically altered diet, as pt was receiving a chopped diet PTA  - Increased nutrient needs with Deep Tissue Pressure Injuries   - Acute cholecystitis--> As per surgery team notes 1/24/23, " No acute surgical intervention given pt comorbidities."     GI:  Last BM 1/24/23 Bowel Regimen? [] Yes   [X] No    Weights: Dosing wt: 124 pounds (1/20)  Bed wt obtained by RD : 121.6 pounds  (1/21); 121.2 pounds (1/24)  - Monitor weights as able during present admission     Nutritionally Pertinent MEDICATIONS  (STANDING):  dextrose 5%.  dextrose 5%.  dextrose 50% Injectable  dextrose 50% Injectable  dextrose 50% Injectable  glucagon  Injectable  insulin lispro (ADMELOG) corrective regimen sliding scale  insulin lispro (ADMELOG) corrective regimen sliding scale  levothyroxine Injectable  metoprolol succinate ER  piperacillin/tazobactam IVPB..  sodium chloride 0.9%.    Pertinent Labs: 01-24 @ 06:59: Na 138, BUN 12, Cr 0.91, <H>, K+ 3.7, Phos --, Mg --, Alk Phos --, ALT/SGPT --, AST/SGOT --, HbA1c --    A1C with Estimated Average Glucose Result: 6.3 % (01-20-23 @ 07:04)    Finger Sticks:  POCT Blood Glucose.: 119 mg/dL (01-24 @ 12:15)  POCT Blood Glucose.: 98 mg/dL (01-24 @ 08:26)  POCT Blood Glucose.: 99 mg/dL (01-23 @ 22:04)  POCT Blood Glucose.: 172 mg/dL (01-23 @ 17:23)      Skin per nursing documentation: As per flow sheets, noted with deep tissue pressure injury to sacrum & bilateral buttocks   Edema: No edema noted per flow sheets     Estimated Needs:   [X] no change since previous assessment  [] recalculated:     Previous Nutrition Diagnosis:   1. Severe Chronic Malnutrition  2. Increased nutrient needs   Nutrition Diagnosis is: [X] ongoing  [] resolved [] not applicable     New Nutrition Diagnosis: [X] Not applicable    Nutrition Care Plan:  [X] In Progress  [] Achieved  [] Not applicable    Nutrition Interventions:     Education Provided:       [] Yes:  [X] No:        Recommendations:         1. Recommend discontinue Consistent Carbohydrate diet to optimize PO intakes. Continue Low fat restriction      2. Recommend diet downgrade to soft and bite sized diet. Monitor for tolerance and acceptance      3. Consider adding multivitamin and vitamin C supplements if no medical contraindications to aid in wound healing.      4.  Recommend Ensure Clear Supplement 2x/day (240 kcal, 8gm pro, 0gm fat per serving) to met pt's increased nutrient needs     5. Recommend LPS 30 ml 1x/Day to provide additional protein for wound healing      Monitoring and Evaluation:   Continue to monitor nutritional intake, tolerance to diet prescription, weights, labs, skin integrity    RD remains available upon request and will follow up per protocol  Beatriz Cedeño, MS,RDN,CDN Pager #[phone] or TEAMS Nutrition Follow Up Note  Patient seen for: 1st malnutrition follow up     Chart reviewed, events noted."82F PMHx early dementia, CHF, hypothyroidism, anxiety, HTN, HLD, vertigo, DM, and gastritis with recent hospitalization 11/2022 at [facility] for urosepsis (fungal) requiring intubation. 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 Discharged 12/5 and at a rehab facility. Daughter reports pt's functional capacity declined severely since hospitalization. She now uses a wheelchair and has lost weight with poor appetite. Pt's care has been at [facility], but her PCP in rehab Dr. Santos recommended she come to [facility]. Pt has worsening r-sided abd pain since Sunday. Imaging suggests acute cholecystitis."    Source: [] Patient       [x]  Medical Record        [X] RN/PCA       [] Family at bedside       [] Other:    -If unable to interview patient: [] Trach/Vent/BiPAP  [] Disoriented/confused/inappropriate to interview    Diet Order:   Diet, Regular:   Consistent Carbohydrate {Evening Snack} (CSTCHOSN)  Low Fat (LOWFAT) (01-20-23)    - Is current order appropriate/adequate? [X] Yes  []  No:     - PO intake :   [] >75%  Adequate    [] 50-75%  Fair       [X] <50%  Poor    Nutrition-related concerns:  - Poor PO intakes persists, observed lunch tray with PCA providing assistance during meal, pt consumed <25%  -  Hx of diabetes, however given advanced age (>75 years), and well controlled A1c-6.3% (1/20/23), pt may benefit from diet liberalization  - Previous RD documentation notes need for a mechanically altered diet, as pt was receiving a chopped diet PTA  - Increased nutrient needs with Deep Tissue Pressure Injuries   - Acute cholecystitis--> As per surgery team notes 1/24/23, " No acute surgical intervention given pt comorbidities."     GI:  Last BM 1/24/23 Bowel Regimen? [] Yes   [X] No    Weights: Dosing wt: 124 pounds (1/20)  Bed wt obtained by RD : 121.6 pounds  (1/21); 121.2 pounds (1/24)  - Monitor weights as able during present admission     Nutritionally Pertinent MEDICATIONS  (STANDING):  dextrose 5%.  dextrose 5%.  dextrose 50% Injectable  dextrose 50% Injectable  dextrose 50% Injectable  glucagon  Injectable  insulin lispro (ADMELOG) corrective regimen sliding scale  insulin lispro (ADMELOG) corrective regimen sliding scale  levothyroxine Injectable  metoprolol succinate ER  piperacillin/tazobactam IVPB..  sodium chloride 0.9%.    Pertinent Labs: 01-24 @ 06:59: Na 138, BUN 12, Cr 0.91, <H>, K+ 3.7, Phos --, Mg --, Alk Phos --, ALT/SGPT --, AST/SGOT --, HbA1c --    A1C with Estimated Average Glucose Result: 6.3 % (01-20-23 @ 07:04)    Finger Sticks:  POCT Blood Glucose.: 119 mg/dL (01-24 @ 12:15)  POCT Blood Glucose.: 98 mg/dL (01-24 @ 08:26)  POCT Blood Glucose.: 99 mg/dL (01-23 @ 22:04)  POCT Blood Glucose.: 172 mg/dL (01-23 @ 17:23)      Skin per nursing documentation: As per flow sheets, noted with deep tissue pressure injury to sacrum & bilateral buttocks   Edema: No edema noted per flow sheets     Estimated Needs:   [X] no change since previous assessment  [] recalculated:     Previous Nutrition Diagnosis:   1. Severe Chronic Malnutrition  2. Increased nutrient needs   Nutrition Diagnosis is: [X] ongoing  [] resolved [] not applicable     New Nutrition Diagnosis: [X] Not applicable    Nutrition Care Plan:  [X] In Progress  [] Achieved  [] Not applicable    Nutrition Interventions:     Education Provided:       [] Yes:  [X] No:        Recommendations:         1. Recommend discontinue Consistent Carbohydrate diet to optimize PO intakes. Continue Low fat restriction      2. Recommend diet downgrade to soft and bite sized diet. Monitor for tolerance and acceptance      3. Consider adding multivitamin and vitamin C supplements if no medical contraindications to aid in wound healing.      4.  Recommend Ensure Clear Supplement 2x/day (240 kcal, 8gm pro, 0gm fat per serving) to met pt's increased nutrient needs     5. Recommend LPS 30 ml 1x/Day to provide additional protein for wound healing      Monitoring and Evaluation:   Continue to monitor nutritional intake, tolerance to diet prescription, weights, labs, skin integrity    RD remains available upon request and will follow up per protocol  Beatriz Cedeño, MS,RDN,CDN Pager #[phone] or TEAMS Nutrition Follow Up Note  Patient seen for: 1st malnutrition follow up     Chart reviewed, events noted."82F PMHx early dementia, CHF, hypothyroidism, anxiety, HTN, HLD, vertigo, DM, and gastritis with recent hospitalization 11/2022 at [facility] for urosepsis (fungal) requiring intubation. 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 Discharged 12/5 and at a rehab facility. Daughter reports pt's functional capacity declined severely since hospitalization. She now uses a wheelchair and has lost weight with poor appetite. Pt's care has been at [facility], but her PCP in rehab Dr. Santos recommended she come to [facility]. Pt has worsening r-sided abd pain since Sunday. Imaging suggests acute cholecystitis."    Source: [] Patient       [x]  Medical Record        [X] RN/PCA       [] Family at bedside       [] Other:    -If unable to interview patient: [] Trach/Vent/BiPAP  [] Disoriented/confused/inappropriate to interview    Diet Order:   Diet, Regular:   Consistent Carbohydrate {Evening Snack} (CSTCHOSN)  Low Fat (LOWFAT) (01-20-23)    - Is current order appropriate/adequate? [X] Yes  []  No:     - PO intake :   [] >75%  Adequate    [] 50-75%  Fair       [X] <50%  Poor    Nutrition-related concerns:  - Poor PO intakes persists, observed lunch tray with PCA providing assistance during meal, pt consumed <25%  -  Hx of diabetes, however given advanced age (>74 years), and well controlled A1c-6.3% (1/20/23), pt may benefit from diet liberalization  - Previous RD documentation notes need for a mechanically altered diet, as pt was receiving a chopped diet PTA  - Increased nutrient needs with Deep Tissue Pressure Injuries   - Acute cholecystitis--> As per surgery team notes 1/24/23, " No acute surgical intervention given pt comorbidities."     GI:  Last BM 1/24/23 Bowel Regimen? [] Yes   [X] No    Weights: Dosing wt: 124 pounds (1/20)  Bed wt obtained by RD : 121.6 pounds  (1/21); 121.2 pounds (1/24)  - Monitor weights as able during present admission     Nutritionally Pertinent MEDICATIONS  (STANDING):  dextrose 5%.  dextrose 5%.  dextrose 50% Injectable  dextrose 50% Injectable  dextrose 50% Injectable  glucagon  Injectable  insulin lispro (ADMELOG) corrective regimen sliding scale  insulin lispro (ADMELOG) corrective regimen sliding scale  levothyroxine Injectable  metoprolol succinate ER  piperacillin/tazobactam IVPB..  sodium chloride 0.9%.    Pertinent Labs: 01-24 @ 06:59: Na 138, BUN 12, Cr 0.91, <H>, K+ 3.7, Phos --, Mg --, Alk Phos --, ALT/SGPT --, AST/SGOT --, HbA1c --    A1C with Estimated Average Glucose Result: 6.3 % (01-20-23 @ 07:04)    Finger Sticks:  POCT Blood Glucose.: 119 mg/dL (01-24 @ 12:15)  POCT Blood Glucose.: 98 mg/dL (01-24 @ 08:26)  POCT Blood Glucose.: 99 mg/dL (01-23 @ 22:04)  POCT Blood Glucose.: 172 mg/dL (01-23 @ 17:23)      Skin per nursing documentation: As per flow sheets, noted with deep tissue pressure injury to sacrum & bilateral buttocks   Edema: No edema noted per flow sheets     Estimated Needs:   [X] no change since previous assessment  [] recalculated:     Previous Nutrition Diagnosis:   1. Severe Chronic Malnutrition  2. Increased nutrient needs   Nutrition Diagnosis is: [X] ongoing  [] resolved [] not applicable     New Nutrition Diagnosis: [X] Not applicable    Nutrition Care Plan:  [X] In Progress  [] Achieved  [] Not applicable    Nutrition Interventions:     Education Provided:       [] Yes:  [X] No:        Recommendations:         1. Recommend discontinue Consistent Carbohydrate diet to optimize PO intakes. Continue Low fat restriction      2. Recommend diet downgrade to soft and bite sized diet. Monitor for tolerance and acceptance      3. Consider adding multivitamin and vitamin C supplements if no medical contraindications to aid in wound healing.      4.  Recommend Ensure Clear Supplement 2x/day (240 kcal, 8gm pro, 0gm fat per serving) to met pt's increased nutrient needs     5. Recommend LPS 30 ml 1x/Day to provide additional protein for wound healing      Monitoring and Evaluation:   Continue to monitor nutritional intake, tolerance to diet prescription, weights, labs, skin integrity    RD remains available upon request and will follow up per protocol  Beatriz Cedeño, MS,RDN,CDN Pager #[phone] or TEAMS

## 2023-01-24 NOTE — PHYSICAL THERAPY INITIAL EVALUATION ADULT - PERTINENT HX OF CURRENT PROBLEM, REHAB EVAL
83 y/o woman with a referral of patient to the ER from the SNF with RUQ pain with CTT and RUQ US evidence of acute cholecystitis, triglycerides nondiagnostic and suspect passed stone in the setting of patient with a complex medical history of a prolonged hospitalization at [facility] ([facility]) with severe septic shock requiring vasopressors, intubation/extubation at the time, with complicating MI and CVA, and COVID-19 at the time.  Patient previously independent in the summer 2022 but apparently since hospitalization has manifested progressive cognitive impairment, and was due to be seen at the SNF by a neurologist and cardiologist on followup.Not  asurigcal candidate 2/2 risk o f bleeding  Renal US: No perinephric fluid collection. Trace perihepatic fluid is noted. Right renal upper pole cyst  CT head: no hemorrhage; CXR: R basilar linear atelectasis 83 y/o woman with a referral of patient to the ER from the SNF with RUQ pain with CTT and RUQ US evidence of acute cholecystitis, triglycerides nondiagnostic and suspect passed stone in the setting of patient with a complex medical history of a prolonged hospitalization at [facility] ([facility]) with severe septic shock requiring vasopressors, intubation/extubation at the time, with complicating MI and CVA, and COVID-19 at the time.  Patient previously independent in the summer 2022 but apparently since hospitalization has manifested progressive cognitive impairment, and was due to be seen at the SNF by a neurologist and cardiologist on followup.Not  asurigcal candidate 2/2 risk o f bleeding  Renal US: No perinephric fluid collection. Trace perihepatic fluid is noted. Right renal upper pole cyst  CT head: no hemorrhage; CXR: R basilar linear atelectasis 81 y/o woman with a referral of patient to the ER from the SNF with RUQ pain with CTT and RUQ US evidence of acute cholecystitis, triglycerides nondiagnostic and suspect passed stone in the setting of patient with a complex medical history of a prolonged hospitalization at [facility] ([facility]) with severe septic shock requiring vasopressors, intubation/extubation at the time, with complicating MI and CVA, and COVID-19 at the time.  Patient previously independent in the summer 2022 but apparently since hospitalization has manifested progressive cognitive impairment, and was due to be seen at the SNF by a neurologist and cardiologist on followup.Not  asurigcal candidate 2/2 risk o f bleeding  Renal US: No perinephric fluid collection. Trace perihepatic fluid is noted. Right renal upper pole cyst  CT head: no hemorrhage; CXR: R basilar linear atelectasis

## 2023-01-24 NOTE — PROGRESS NOTE ADULT - REASON FOR ADMISSION
Sent from [facility] for RUQ abdominal pain since 1/15/23 Sent from [facility] for RUQ abdominal pain since 1/15/23 Sent from [facility] for RUQ abdominal pain since 1/15/23

## 2023-01-25 ENCOUNTER — TRANSCRIPTION ENCOUNTER (OUTPATIENT)
Age: 83
End: 2023-01-25

## 2023-01-25 VITALS
RESPIRATION RATE: 18 BRPM | SYSTOLIC BLOOD PRESSURE: 127 MMHG | HEART RATE: 78 BPM | TEMPERATURE: 98 F | DIASTOLIC BLOOD PRESSURE: 76 MMHG | OXYGEN SATURATION: 95 %

## 2023-01-25 LAB
ALBUMIN SERPL ELPH-MCNC: 2.5 G/DL — LOW (ref 3.3–5)
ALP SERPL-CCNC: 125 U/L — HIGH (ref 40–120)
ALT FLD-CCNC: 9 U/L — LOW (ref 10–45)
ANION GAP SERPL CALC-SCNC: 19 MMOL/L — HIGH (ref 5–17)
AST SERPL-CCNC: 23 U/L — SIGNIFICANT CHANGE UP (ref 10–40)
BILIRUB SERPL-MCNC: 0.5 MG/DL — SIGNIFICANT CHANGE UP (ref 0.2–1.2)
BUN SERPL-MCNC: 10 MG/DL — SIGNIFICANT CHANGE UP (ref 7–23)
CALCIUM SERPL-MCNC: 9.2 MG/DL — SIGNIFICANT CHANGE UP (ref 8.4–10.5)
CHLORIDE SERPL-SCNC: 98 MMOL/L — SIGNIFICANT CHANGE UP (ref 96–108)
CO2 SERPL-SCNC: 20 MMOL/L — LOW (ref 22–31)
CREAT SERPL-MCNC: 0.96 MG/DL — SIGNIFICANT CHANGE UP (ref 0.5–1.3)
EGFR: 59 ML/MIN/1.73M2 — LOW
GLUCOSE BLDC GLUCOMTR-MCNC: 127 MG/DL — HIGH (ref 70–99)
GLUCOSE BLDC GLUCOMTR-MCNC: 146 MG/DL — HIGH (ref 70–99)
GLUCOSE BLDC GLUCOMTR-MCNC: 99 MG/DL — SIGNIFICANT CHANGE UP (ref 70–99)
GLUCOSE SERPL-MCNC: 79 MG/DL — SIGNIFICANT CHANGE UP (ref 70–99)
HCT VFR BLD CALC: 37.9 % — SIGNIFICANT CHANGE UP (ref 34.5–45)
HGB BLD-MCNC: 11.8 G/DL — SIGNIFICANT CHANGE UP (ref 11.5–15.5)
MCHC RBC-ENTMCNC: 29.1 PG — SIGNIFICANT CHANGE UP (ref 27–34)
MCHC RBC-ENTMCNC: 31.1 GM/DL — LOW (ref 32–36)
MCV RBC AUTO: 93.3 FL — SIGNIFICANT CHANGE UP (ref 80–100)
NRBC # BLD: 0 /100 WBCS — SIGNIFICANT CHANGE UP (ref 0–0)
PLATELET # BLD AUTO: 309 K/UL — SIGNIFICANT CHANGE UP (ref 150–400)
POTASSIUM SERPL-MCNC: 3.5 MMOL/L — SIGNIFICANT CHANGE UP (ref 3.5–5.3)
POTASSIUM SERPL-SCNC: 3.5 MMOL/L — SIGNIFICANT CHANGE UP (ref 3.5–5.3)
PROT SERPL-MCNC: 6.3 G/DL — SIGNIFICANT CHANGE UP (ref 6–8.3)
RBC # BLD: 4.06 M/UL — SIGNIFICANT CHANGE UP (ref 3.8–5.2)
RBC # FLD: 15.8 % — HIGH (ref 10.3–14.5)
SARS-COV-2 RNA SPEC QL NAA+PROBE: SIGNIFICANT CHANGE UP
SODIUM SERPL-SCNC: 137 MMOL/L — SIGNIFICANT CHANGE UP (ref 135–145)
WBC # BLD: 8.68 K/UL — SIGNIFICANT CHANGE UP (ref 3.8–10.5)
WBC # FLD AUTO: 8.68 K/UL — SIGNIFICANT CHANGE UP (ref 3.8–10.5)

## 2023-01-25 PROCEDURE — U0003: CPT

## 2023-01-25 PROCEDURE — 80053 COMPREHEN METABOLIC PANEL: CPT

## 2023-01-25 PROCEDURE — 84295 ASSAY OF SERUM SODIUM: CPT

## 2023-01-25 PROCEDURE — 76775 US EXAM ABDO BACK WALL LIM: CPT

## 2023-01-25 PROCEDURE — 85027 COMPLETE CBC AUTOMATED: CPT

## 2023-01-25 PROCEDURE — 99232 SBSQ HOSP IP/OBS MODERATE 35: CPT

## 2023-01-25 PROCEDURE — 84100 ASSAY OF PHOSPHORUS: CPT

## 2023-01-25 PROCEDURE — 82947 ASSAY GLUCOSE BLOOD QUANT: CPT

## 2023-01-25 PROCEDURE — 86901 BLOOD TYPING SEROLOGIC RH(D): CPT

## 2023-01-25 PROCEDURE — 76705 ECHO EXAM OF ABDOMEN: CPT

## 2023-01-25 PROCEDURE — 83735 ASSAY OF MAGNESIUM: CPT

## 2023-01-25 PROCEDURE — 96375 TX/PRO/DX INJ NEW DRUG ADDON: CPT

## 2023-01-25 PROCEDURE — 99285 EMERGENCY DEPT VISIT HI MDM: CPT | Mod: 25

## 2023-01-25 PROCEDURE — 85025 COMPLETE CBC W/AUTO DIFF WBC: CPT

## 2023-01-25 PROCEDURE — 85014 HEMATOCRIT: CPT

## 2023-01-25 PROCEDURE — 85730 THROMBOPLASTIN TIME PARTIAL: CPT

## 2023-01-25 PROCEDURE — 70450 CT HEAD/BRAIN W/O DYE: CPT

## 2023-01-25 PROCEDURE — 97161 PT EVAL LOW COMPLEX 20 MIN: CPT

## 2023-01-25 PROCEDURE — 87637 SARSCOV2&INF A&B&RSV AMP PRB: CPT

## 2023-01-25 PROCEDURE — U0005: CPT

## 2023-01-25 PROCEDURE — 84443 ASSAY THYROID STIM HORMONE: CPT

## 2023-01-25 PROCEDURE — 83605 ASSAY OF LACTIC ACID: CPT

## 2023-01-25 PROCEDURE — 83036 HEMOGLOBIN GLYCOSYLATED A1C: CPT

## 2023-01-25 PROCEDURE — 96374 THER/PROPH/DIAG INJ IV PUSH: CPT

## 2023-01-25 PROCEDURE — 87640 STAPH A DNA AMP PROBE: CPT

## 2023-01-25 PROCEDURE — 84478 ASSAY OF TRIGLYCERIDES: CPT

## 2023-01-25 PROCEDURE — 86850 RBC ANTIBODY SCREEN: CPT

## 2023-01-25 PROCEDURE — 84132 ASSAY OF SERUM POTASSIUM: CPT

## 2023-01-25 PROCEDURE — 85018 HEMOGLOBIN: CPT

## 2023-01-25 PROCEDURE — 82435 ASSAY OF BLOOD CHLORIDE: CPT

## 2023-01-25 PROCEDURE — 82803 BLOOD GASES ANY COMBINATION: CPT

## 2023-01-25 PROCEDURE — 71045 X-RAY EXAM CHEST 1 VIEW: CPT

## 2023-01-25 PROCEDURE — 82962 GLUCOSE BLOOD TEST: CPT

## 2023-01-25 PROCEDURE — 85610 PROTHROMBIN TIME: CPT

## 2023-01-25 PROCEDURE — 80048 BASIC METABOLIC PNL TOTAL CA: CPT

## 2023-01-25 PROCEDURE — 86900 BLOOD TYPING SEROLOGIC ABO: CPT

## 2023-01-25 PROCEDURE — 83690 ASSAY OF LIPASE: CPT

## 2023-01-25 PROCEDURE — 87040 BLOOD CULTURE FOR BACTERIA: CPT

## 2023-01-25 PROCEDURE — 36415 COLL VENOUS BLD VENIPUNCTURE: CPT

## 2023-01-25 PROCEDURE — 82330 ASSAY OF CALCIUM: CPT

## 2023-01-25 PROCEDURE — 87641 MR-STAPH DNA AMP PROBE: CPT

## 2023-01-25 PROCEDURE — 74176 CT ABD & PELVIS W/O CONTRAST: CPT | Mod: MA

## 2023-01-25 RX ORDER — METOPROLOL TARTRATE 50 MG
1 TABLET ORAL
Qty: 0 | Refills: 0 | DISCHARGE
Start: 2023-01-25

## 2023-01-25 RX ORDER — ASPIRIN/CALCIUM CARB/MAGNESIUM 324 MG
1 TABLET ORAL
Qty: 0 | Refills: 0 | DISCHARGE
Start: 2023-01-25

## 2023-01-25 RX ORDER — HEPARIN SODIUM 5000 [USP'U]/ML
5000 INJECTION INTRAVENOUS; SUBCUTANEOUS
Qty: 0 | Refills: 0 | DISCHARGE
Start: 2023-01-25

## 2023-01-25 RX ORDER — MUPIROCIN 20 MG/G
1 OINTMENT TOPICAL
Qty: 1 | Refills: 0
Start: 2023-01-25 | End: 2023-01-29

## 2023-01-25 RX ORDER — DEXTROSE 50 % IN WATER 50 %
1 SYRINGE (ML) INTRAVENOUS
Qty: 0 | Refills: 0 | DISCHARGE
Start: 2023-01-25

## 2023-01-25 RX ORDER — MUPIROCIN 20 MG/G
1 OINTMENT TOPICAL
Refills: 0 | Status: DISCONTINUED | OUTPATIENT
Start: 2023-01-25 | End: 2023-01-25

## 2023-01-25 RX ORDER — TICAGRELOR 90 MG/1
1 TABLET ORAL
Qty: 0 | Refills: 0 | DISCHARGE
Start: 2023-01-25

## 2023-01-25 RX ADMIN — Medication 25 MILLIGRAM(S): at 06:14

## 2023-01-25 RX ADMIN — Medication 81 MILLIGRAM(S): at 13:48

## 2023-01-25 RX ADMIN — PIPERACILLIN AND TAZOBACTAM 25 GRAM(S): 4; .5 INJECTION, POWDER, LYOPHILIZED, FOR SOLUTION INTRAVENOUS at 06:15

## 2023-01-25 RX ADMIN — MUPIROCIN 1 APPLICATION(S): 20 OINTMENT TOPICAL at 17:45

## 2023-01-25 RX ADMIN — CHLORHEXIDINE GLUCONATE 1 APPLICATION(S): 213 SOLUTION TOPICAL at 06:15

## 2023-01-25 RX ADMIN — TICAGRELOR 90 MILLIGRAM(S): 90 TABLET ORAL at 06:14

## 2023-01-25 RX ADMIN — HEPARIN SODIUM 5000 UNIT(S): 5000 INJECTION INTRAVENOUS; SUBCUTANEOUS at 13:48

## 2023-01-25 RX ADMIN — TICAGRELOR 90 MILLIGRAM(S): 90 TABLET ORAL at 17:44

## 2023-01-25 NOTE — PROGRESS NOTE ADULT - PROBLEM SELECTOR PLAN 8
Daughter agrees to pharmacologic DVT prophylaxis.

## 2023-01-25 NOTE — DISCHARGE NOTE PROVIDER - CARE PROVIDER_API CALL
Ronal Munoz)  Internal Medicine  [address] [place] [address]  Phone: [phone]  Fax: [phone]  Follow Up Time: 1 week    Sidney Cesar  CARDIOVASCULAR DISEASE  [address] [facility], [address] [address]  Phone: [phone]  Fax: [phone]  Follow Up Time: Routine    Daniella Ribeiro)  Internal Medicine  [address] [place] [address]  Phone: [phone]  Fax: [phone]  Follow Up Time: Routine   Ronal Munoz)  Internal Medicine  [address] [place] [address]  Phone: [phone]  Fax: [phone]  Follow Up Time: 1 week    Sidney Cesar  CARDIOVASCULAR DISEASE  [address] [facility], [address] [address]  Phone: [phone]  Fax: [phone]  Follow Up Time: Routine    Daniella Ribeiro)  Internal Medicine  [address] [place] [address]  Phone: [phone]  Fax: [phone]  Follow Up Time: Routine   Ronal Munoz)  Internal Medicine  [address] [place] [address]  Phone: [phone]  Fax: [phone]  Follow Up Time: 1 week    Sidney Cesar  CARDIOVASCULAR DISEASE  [address] [facility], [address] [address]  Phone: [phone]  Fax: [phone]  Follow Up Time: Routine    Daniella Ribeiro)  Internal Medicine  [address] [place] [address]  Phone: [phone]  Fax: [phone]  Follow Up Time: Routine

## 2023-01-25 NOTE — DISCHARGE NOTE PROVIDER - CARE PROVIDERS DIRECT ADDRESSES
,DirectAddress_Unknown,DirectAddress_Unknown,[email or web address] ,DirectAddress_Unknown,DirectAddress_Unknown,[email or web address] ,DirectAddress_Unknown,DirectAddress_Unknown,[email or web address]

## 2023-01-25 NOTE — PROGRESS NOTE ADULT - SUBJECTIVE AND OBJECTIVE BOX
Patient is a 82y old  Female who presents with a chief complaint of [facility] abdominal pain since 1/15/23 (25 Jan 2023 07:00)      INTERVAL HPI/OVERNIGHT EVENTS: No event. Afebrile. Asymptomatic. Medically clear for discharge on PO Augmentin for total 14 days. She is not candidate for any gallbladder procedure. F/u surgeon in 2 weeks.     Pain Location & Control: OK    MEDICATIONS  (STANDING):  aspirin enteric coated 81 milliGRAM(s) Oral daily  chlorhexidine 2% Cloths 1 Application(s) Topical <User Schedule>  dextrose 5%. 1000 milliLiter(s) (100 mL/Hr) IV Continuous <Continuous>  dextrose 5%. 1000 milliLiter(s) (50 mL/Hr) IV Continuous <Continuous>  dextrose 50% Injectable 25 Gram(s) IV Push once  dextrose 50% Injectable 12.5 Gram(s) IV Push once  dextrose 50% Injectable 25 Gram(s) IV Push once  glucagon  Injectable 1 milliGRAM(s) IntraMuscular once  heparin   Injectable 5000 Unit(s) SubCutaneous <User Schedule>  insulin lispro (ADMELOG) corrective regimen sliding scale   SubCutaneous at bedtime  insulin lispro (ADMELOG) corrective regimen sliding scale   SubCutaneous three times a day before meals  levothyroxine Injectable 25 MICROGram(s) IV Push at bedtime  metoprolol succinate ER 25 milliGRAM(s) Oral daily  piperacillin/tazobactam IVPB.. 3.375 Gram(s) IV Intermittent every 8 hours  sodium chloride 0.9%. 1000 milliLiter(s) (70 mL/Hr) IV Continuous <Continuous>  ticagrelor 90 milliGRAM(s) Oral every 12 hours    MEDICATIONS  (PRN):  dextrose Oral Gel 15 Gram(s) Oral once PRN Blood Glucose LESS THAN 70 milliGRAM(s)/deciliter      Allergies    No Known Allergies    Intolerances        REVIEW OF SYSTEMS:  CONSTITUTIONAL: No fever, weight loss, or fatigue  EYES: No eye pain, visual disturbances, or discharge  ENMT:  No difficulty hearing, tinnitus, vertigo; No sinus or throat pain  NECK: No pain or stiffness  BREASTS: No pain, masses, or nipple discharge  RESPIRATORY: No cough, wheezing, chills or hemoptysis; No shortness of breath  CARDIOVASCULAR: No chest pain, palpitations, dizziness, or leg swelling  GASTROINTESTINAL: No abdominal or epigastric pain. No nausea, vomiting, or hematemesis; No diarrhea or constipation. No melena or hematochezia.  GENITOURINARY: No dysuria, frequency, hematuria, or incontinence  NEUROLOGICAL: No headaches, memory loss, loss of strength, numbness, or tremors  SKIN: No itching, burning, rashes, or lesions   LYMPH NODES: No enlarged glands  ENDOCRINE: No heat or cold intolerance; No hair loss; No polydipsia or polyuria  MUSCULOSKELETAL: No back pain  PSYCHIATRIC: No depression, anxiety, mood swings, or difficulty sleeping  HEME/LYMPH: No easy bruising, or bleeding gums  ALLERGY AND IMMUNOLOGIC: No hives or eczema    Vital Signs Last 24 Hrs  T(C): 36.6 (25 Jan 2023 05:30), Max: 36.8 (24 Jan 2023 09:36)  T(F): 97.8 (25 Jan 2023 05:30), Max: 98.2 (24 Jan 2023 09:36)  HR: 79 (25 Jan 2023 05:30) (79 - 85)  BP: 123/70 (25 Jan 2023 05:30) (107/67 - 147/83)  BP(mean): --  RR: 18 (25 Jan 2023 05:30) (17 - 18)  SpO2: 95% (25 Jan 2023 05:30) (95% - 97%)    Parameters below as of 25 Jan 2023 05:30  Patient On (Oxygen Delivery Method): room air        PHYSICAL EXAM:  GENERAL: NAD, well-groomed, well-developed  HEAD:  Atraumatic, Normocephalic  EYES: EOMI, PERRLA, conjunctiva and sclera clear  ENMT: No tonsillar erythema, exudates, or enlargement; Moist mucous membranes, Good dentition, No lesions  NECK: Supple, No JVD, Normal thyroid  NERVOUS SYSTEM:  Alert & Oriented X3, Good concentration; Motor Strength 5/5 B/L upper and lower extremities; DTRs 2+ intact and symmetric  CHEST/LUNG: Clear to auscultation bilaterally; No rales, rhonchi, wheezing, or rubs  HEART: Regular rate and rhythm; No murmurs, rubs, or gallops  ABDOMEN: Soft, Nontender, Nondistended; Bowel sounds present  EXTREMITIES:  2+ Peripheral Pulses, No clubbing or cyanosis  LYMPH: No lymphadenopathy noted  SKIN: No rashes or lesions    LABS:                        11.8   8.68  )-----------( 309      ( 25 Jan 2023 07:18 )             37.9     25 Jan 2023 07:18    137    |  98     |  10     ----------------------------<  79     3.5     |  20     |  0.96     Ca    9.2        25 Jan 2023 07:18    TPro  6.3    /  Alb  2.5    /  TBili  0.5    /  DBili  x      /  AST  23     /  ALT  9      /  AlkPhos  125    25 Jan 2023 07:18        CAPILLARY BLOOD GLUCOSE      POCT Blood Glucose.: 99 mg/dL (25 Jan 2023 08:18)  POCT Blood Glucose.: 100 mg/dL (24 Jan 2023 22:24)  POCT Blood Glucose.: 112 mg/dL (24 Jan 2023 17:48)  POCT Blood Glucose.: 119 mg/dL (24 Jan 2023 12:15)        Cultures  Culture Results:   No Growth Final (01-19-23 @ 05:56)      RADIOLOGY & ADDITIONAL TESTS:    Imaging Personally Reviewed:  [ ] YES  [ ] NO    Consultant(s) Notes Reviewed:  [X ] YES  [ ] NO    Care Discussed with Consultants/Other Providers [X ] YES  [ ] NO

## 2023-01-25 NOTE — DISCHARGE NOTE PROVIDER - NPI NUMBER (FOR SYSADMIN USE ONLY) :
[[id number]],[[id number]],[[id number]] [[id number]],[[id number]],[[id number]] [[id number]],[[id number]]

## 2023-01-25 NOTE — PROGRESS NOTE ADULT - PROBLEM SELECTOR PROBLEM 2
History of pyelonephritis

## 2023-01-25 NOTE — PROGRESS NOTE ADULT - ASSESSMENT
Referral of patient to the ER from the SNF with RUQ pain with CTT and RUQ US evidence of acute cholecystitis, triglycerides nondiagnostic and suspect passed stone in the setting of patient with a complex medical history of a prolonged hospitalization at [facility] ([facility]) with severe septic shock requiring vasopressors, intubation/extubation at the time, with complicating MI and CVA, and COVID-19 at the time.  Patient previously independent in the summer 2022 but apparently since hospitalization has manifested progressive cognitive impairment, and was due to be seen at the SNF by a neurologist and cardiologist on followup.    Seen by general surgery recommended poor candidate for alb choleugenio  recommended percutaneous cholecystoscopy tube. Evaluated by IR who recommended to hold Brillanta for 7 days. Aspirin resumed  by cardiology. Possible procedure on next Thursday for percutaneous cholecystoscopy.     Invanz switched to Zosyn. Continue IV fluid. RUQ tenderness resolved.     Leukocytosis resolving. Continue Zosyn.     NO clear collection on CTT abdomen but limited due to lack of IV contrast due to [facility].   Will obtain a renal US.   Invanz should cover the potential additional urinary source.    Will obtain a noncontrast CTT head.  Aspiration precautions.   Enhanced supervision.   Would consider formal neurology evaluation in the AM.    Will obtain an echo and would clarify with PCP patient's Jardiance, presumably for history of undifferentiated CHF.    Presently patient is not in decompensated CHF.   Would consider formal cardiology evaluation in the AM.    Will provide FS S/S for now to monitor for excess hyperglycemia and for hypoglycemia.   Nutrition consult.    Would attempt to clarify from prior records at [facility] workup of involuntary weight loss or recent outpatient workup.    Daughter aware of followup of repeat CTT chest upon discharge as outlined with small undifferentiated pulmonary nodules as above.    Daughter agrees to pharmacologic DVT prophylaxis.  Referral of patient to the ER from the SNF with RUQ pain with CTT and RUQ US evidence of acute cholecystitis, triglycerides nondiagnostic and suspect passed stone in the setting of patient with a complex medical history of a prolonged hospitalization at [facility] ([facility]) with severe septic shock requiring vasopressors, intubation/extubation at the time, with complicating MI and CVA, and COVID-19 at the time.  Patient previously independent in the summer 2022 but apparently since hospitalization has manifested progressive cognitive impairment, and was due to be seen at the SNF by a neurologist and cardiologist on followup.    Seen by general surgery recommended poor candidate for alb choleugenio  recommended percutaneous cholecystoscopy tube. Evaluated by IR who recommended to hold Brillanta for 7 days. Aspirin resumed  by cardiology. Possible procedure on next Thursday for percutaneous cholecystoscopy.     Invanz switched to Zosyn. Continue IV fluid. RUQ tenderness resolved.     Leukocytosis resolving. Continue Zosyn.     NO clear collection on CTT abdomen but limited due to lack of IV contrast due to ELIZABETH.   Will obtain a renal US.   Invanz should cover the potential additional urinary source.    Will obtain a noncontrast CTT head.  Aspiration precautions.   Enhanced supervision.   Would consider formal neurology evaluation in the AM.    Will obtain an echo and would clarify with PCP patient's Jardiance, presumably for history of undifferentiated CHF.    Presently patient is not in decompensated CHF.   Would consider formal cardiology evaluation in the AM.    Will provide FS S/S for now to monitor for excess hyperglycemia and for hypoglycemia.   Nutrition consult.    Would attempt to clarify from prior records at [facility] workup of involuntary weight loss or recent outpatient workup.    Daughter aware of followup of repeat CTT chest upon discharge as outlined with small undifferentiated pulmonary nodules as above.    Daughter agrees to pharmacologic DVT prophylaxis.  Referral of patient to the ER from the SNF with RUQ pain with CTT and RUQ US evidence of acute cholecystitis, triglycerides nondiagnostic and suspect passed stone in the setting of patient with a complex medical history of a prolonged hospitalization at [facility] ([facility]) with severe septic shock requiring vasopressors, intubation/extubation at the time, with complicating MI and CVA, and COVID-19 at the time.  Patient previously independent in the summer 2022 but apparently since hospitalization has manifested progressive cognitive impairment, and was due to be seen at the SNF by a neurologist and cardiologist on followup.    Seen by general surgery recommended poor candidate for alb choleugenio  recommended percutaneous cholecystoscopy tube. Evaluated by IR who recommended to hold Brillanta for 7 days. Aspirin resumed  by cardiology. Possible procedure on next Thursday for percutaneous cholecystoscopy.     Invanz switched to Zosyn. Continue IV fluid. RUQ tenderness resolved.     Leukocytosis resolving. Continue Zosyn.     NO clear collection on CTT abdomen but limited due to lack of IV contrast due to EL[facility]AB[facility].   Will obtain a renal US.   Invanz should cover the potential additional urinary source.    Will obtain a noncontrast CTT head.  Aspiration precautions.   Enhanced supervision.   Would consider formal neurology evaluation in the AM.    Will obtain an echo and would clarify with PCP patient's Jardiance, presumably for history of undifferentiated CHF.    Presently patient is not in decompensated CHF.   Would consider formal cardiology evaluation in the AM.    Will provide FS S/S for now to monitor for excess hyperglycemia and for hypoglycemia.   Nutrition consult.    Would attempt to clarify from prior records at [facility] workup of involuntary weight loss or recent outpatient workup.    Daughter aware of followup of repeat CTT chest upon discharge as outlined with small undifferentiated pulmonary nodules as above.    Daughter agrees to pharmacologic DVT prophylaxis.

## 2023-01-25 NOTE — PROGRESS NOTE ADULT - TIME BILLING
Discussed with NP
d/w PA about plan of discharge
Discussed with PA.
Discussed with PA
Discussed with nurses

## 2023-01-25 NOTE — PROGRESS NOTE ADULT - SUBJECTIVE AND OBJECTIVE BOX
Follow Up:  acute cholecystitis     Interval History/ROS: no fever and WBC normalized, improved abd pain, no cough or vomiting            Allergies  No Known Allergies        ANTIMICROBIALS:  amoxicillin  875 milliGRAM(s)/clavulanate 1 two times a day      OTHER MEDS:  aspirin enteric coated 81 milliGRAM(s) Oral daily  chlorhexidine 2% Cloths 1 Application(s) Topical <User Schedule>  dextrose 5%. 1000 milliLiter(s) IV Continuous <Continuous>  dextrose 5%. 1000 milliLiter(s) IV Continuous <Continuous>  dextrose 50% Injectable 25 Gram(s) IV Push once  dextrose 50% Injectable 12.5 Gram(s) IV Push once  dextrose 50% Injectable 25 Gram(s) IV Push once  dextrose Oral Gel 15 Gram(s) Oral once PRN  glucagon  Injectable 1 milliGRAM(s) IntraMuscular once  heparin   Injectable 5000 Unit(s) SubCutaneous <User Schedule>  insulin lispro (ADMELOG) corrective regimen sliding scale   SubCutaneous three times a day before meals  insulin lispro (ADMELOG) corrective regimen sliding scale   SubCutaneous at bedtime  levothyroxine Injectable 25 MICROGram(s) IV Push at bedtime  metoprolol succinate ER 25 milliGRAM(s) Oral daily  mupirocin 2% Nasal 1 Application(s) Both Nostrils two times a day  sodium chloride 0.9%. 1000 milliLiter(s) IV Continuous <Continuous>  ticagrelor 90 milliGRAM(s) Oral every 12 hours      Vital Signs Last 24 Hrs  T(C): 36.6 (25 Jan 2023 05:30), Max: 36.7 (24 Jan 2023 23:47)  T(F): 97.8 (25 Jan 2023 05:30), Max: 98.1 (24 Jan 2023 23:47)  HR: 79 (25 Jan 2023 05:30) (79 - 82)  BP: 123/70 (25 Jan 2023 05:30) (115/69 - 123/70)  BP(mean): --  RR: 18 (25 Jan 2023 05:30) (18 - 18)  SpO2: 95% (25 Jan 2023 05:30) (95% - 97%)    Parameters below as of 25 Jan 2023 05:30  Patient On (Oxygen Delivery Method): room air        Physical Exam:  General:    NAD, non toxic  Cardio:    regular S1,S2, + murmur  Respiratory:   clear b/l, no wheezing  abd:   soft, BS +, RUQ tenderness  :     no CVAT, no suprapubic tenderness, no scott  Musculoskeletal : no joint swelling, no edema  Skin:    no rash  vascular: no phlebitis                        11.8   8.68  )-----------( 309      ( 25 Jan 2023 07:18 )             37.9       01-25    137  |  98  |  10  ----------------------------<  79  3.5   |  20<L>  |  0.96    Ca    9.2      25 Jan 2023 07:18    TPro  6.3  /  Alb  2.5<L>  /  TBili  0.5  /  DBili  x   /  AST  23  /  ALT  9<L>  /  AlkPhos  125<H>  01-25          MICROBIOLOGY:  v  .Blood Blood-Venous  01-19-23   No Growth Final  --  --                RADIOLOGY:  Images independently visualized and reviewed personally, findings as below  < from: CT Abdomen and Pelvis No Cont (01.18.23 @ 19:40) >    IMPRESSION:    Limited noncontrast study.    Gallbladder distention with marked wall thickening and surrounding   inflammatory changes/fluid. Probable layering sludge. Findings are   concerning for acute cholecystitis. Correlate with same day ultrasound.    Retained contrast of bilateral renal parenchyma likelyreflects degree of   renal dysfunction/nephropathy, correlate for date of contrast   administration. Striated appearance of the kidneys, right greater than   left likely reflects infection/pyelonephritis.    Severe anterior wedging deformity of L3 vertebral body, favored to be   chronic. Correlate with clinical symptoms.    3 mm right middle lobe nodule can be followed dedicated chest CT in 12   months, or as per patient risk factors.    < end of copied text >  < from: US Abdomen Upper Quadrant Right (01.18.23 @ 19:02) >  IMPRESSION:  Distended gallbladder and gallbladder wall thickening in the setting of   positive sonographic Pond sign compatible with acute cholecystitis.  Small right upper quadrant ascites.  Small right pleural effusion.    < end of copied text >

## 2023-01-25 NOTE — DISCHARGE NOTE NURSING/CASE MANAGEMENT/SOCIAL WORK - NSDCPEFALRISK_GEN_ALL_CORE
For information on Fall & Injury Prevention, visit: [email or web address] OR  [email or web address] OR  https://www.cdc.gov/steadi/patient.html For information on Fall & Injury Prevention, visit: [email or web address]-mobility OR  [email or web address]-injury OR  https://www.cdc.gov/steadi/patient.html For information on Fall & Injury Prevention, visit: [email or web address]-mobility OR  [email or web address]-injury OR  https://www.cdc.gov/steadi/patient.html

## 2023-01-25 NOTE — DISCHARGE NOTE NURSING/CASE MANAGEMENT/SOCIAL WORK - PATIENT PORTAL LINK FT
You can access the FollowMyHealth Patient Portal offered by [facility] by registering at the following website: [email or web address]. By joining [employer]’s FollowMyHealth portal, you will also be able to view your health information using other applications (apps) compatible with our system. You can access the FollowMyHealth Patient Portal offered by [facility] by registering at the following website: [email or web address]. By joining [employer]’s FollowMyHealth portal, you will also be able to view your health information using other applications (apps) compatible with our system. You can access the FollowMyHealth Patient Portal offered by [facility] by registering at the following website: [email or web address]. By joining [employer]’s FollowMyHealth portal, you will also be able to view your health information using other applications (apps) compatible with our system.

## 2023-01-25 NOTE — DISCHARGE NOTE PROVIDER - HOSPITAL COURSE
·  Problem: Acute cholecystitis.   ·  Plan: See above.   Seen by general surgery.   Will continue with zosyn as above.  not a candidate for sx neither perc choly , d/s to [facility] with po abx , Augmentin.     Problem/Plan - 2:  ·  Problem: History of pyelonephritis.   ·  Plan: See above.   Urinalysis and urine culture, blood cultures ordered.    Proper renal US ordered.     Problem/Plan - 3:  ·  Problem: Cerebrovascular disease.   ·  Plan: See above.   CTT head no contrast ordered.   No appreciable focal deficit but moderate cognitive impairment.     Problem/Plan - 4:  ·  Problem: History of congestive heart failure.   ·  Plan: See above.   Echo.   On ASA and Brilinta.  resume Brilinta.     Problem/Plan - 5:  ·  Problem: Type 2 diabetes mellitus.   ·  Plan: See above.   FS S/S for now but would review FS for bedtime Lantus if required.     Problem/Plan - 6:  ·  Problem: Abnormal weight loss.   ·  Plan: See above.    Would consider clarifying history and recent workup from last hospitalization at [facility].     ·  Problem: Acute cholecystitis.   ·  Plan: See above.   Seen by general surgery.   Will continue with zosyn as above.  not a candidate for sx neither perc choly , d/s to [facility] with po abx , Augmentin.     Problem/Plan - 2:  ·  Problem: History of pyelonephritis.   ·  Plan: See above.   Urinalysis and urine culture, blood cultures ordered.    Proper renal US ordered.     Problem/Plan - 3:  ·  Problem: Cerebrovascular disease.   ·  Plan: See above.   CTT head no contrast ordered.   No appreciable focal deficit but moderate cognitive impairment.     Problem/Plan - 4:  ·  Problem: History of congestive heart failure.   ·  Plan: See above.   Echo.   On ASA and Brilinta.  resume Brilinta.     Problem/Plan - 5:  ·  Problem: Type 2 diabetes mellitus.   ·  Plan: See above.   FS S/S for now but would review FS for bedtime Lantus if required.     Problem/Plan - 6:  ·  Problem: Abnormal weight loss.   ·  Plan: See above.    Would consider clarifying history and recent workup from last hospitalization at [facility].     ·  Problem: Acute cholecystitis.   ·  Plan: See above.   Seen by general surgery.   Will continue with zosyn as above.  not a candidate for sx neither perc choly , d/s to [facility] with po abx , Augmentin.     Problem/Plan - 2:  ·  Problem: History of pyelonephritis.   ·  Plan: See above.   Urinalysis and urine culture, blood cultures ordered.    Proper renal US ordered.     Problem/Plan - 3:  ·  Problem: Cerebrovascular disease.   ·  Plan: See above.   CTT head no contrast ordered.   No appreciable focal deficit but moderate cognitive impairment.     Problem/Plan - 4:  ·  Problem: History of congestive heart failure.   ·  Plan: See above.   Echo.   On ASA and Brilinta.  resume Brilinta.     Problem/Plan - 5:  ·  Problem: Type 2 diabetes mellitus.   ·  Plan: See above.   FS S/S for now but would review FS for bedtime Lantus if required.     Problem/Plan - 6:  ·  Problem: Abnormal weight loss.   ·  Plan: See above.    Would consider clarifying history and recent workup from last hospitalization at [facility].     ·  Problem: Acute cholecystitis.   ·  Plan: See above.   Seen by general surgery.   Will continue with zosyn as above.  not a candidate for sx neither perc choly , d/s to [facility] with po abx , Augmentin.     Problem/Plan - 2:  ·  Problem: History of pyelonephritis.   ·  Plan: See above.   Urinalysis and urine culture, blood cultures ordered.    Proper renal US ordered.     Problem/Plan - 3:  ·  Problem: Cerebrovascular disease.   ·  Plan: See above.   CTT head no contrast ordered.   No appreciable focal deficit but moderate cognitive impairment.     Problem/Plan - 4:  ·  Problem: History of congestive heart failure.   ·  Plan: See above.   Echo.   On ASA and Brilinta.  resume Brilinta.     Problem/Plan - 5:  ·  Problem: Type 2 diabetes mellitus.   ·  Plan: See above.   FS S/S for now but would review FS for bedtime Lantus if required.     Problem/Plan - 6:  ·  Problem: Abnormal weight loss.   ·  Plan: See above.    Would consider clarifying history and recent workup from last hospitalization at [facility].  ian/Plan - 7:  ·  Problem: Pulmonary nodules.   ·  Plan: Reviewed with daughter, subcentimeter nodules will need outpatient followup as outpatient--previously followed by Dr. Wael Huffman.     Problem/Plan - 8:  ·  Problem: Need for prophylactic measure.   ·  Plan: Daughter agrees to pharmacologic DVT prophylaxis.     Problem/Plan - 9:  ·  Problem: Hypothyroidism.   ·  Plan: Check TSH.   Will provide parenteral equivalent of Synthroid.     ·  Problem: Acute cholecystitis.   ·  Plan: See above.   Seen by general surgery.   Will continue with zosyn as above.  not a candidate for sx neither perc choly , d/s to [facility] with po abx , Augmentin.     Problem/Plan - 2:  ·  Problem: History of pyelonephritis.   ·  Plan: See above.   Urinalysis and urine culture, blood cultures ordered.    Proper renal US ordered.     Problem/Plan - 3:  ·  Problem: Cerebrovascular disease.   ·  Plan: See above.   CTT head no contrast ordered.   No appreciable focal deficit but moderate cognitive impairment.     Problem/Plan - 4:  ·  Problem: History of congestive heart failure.   ·  Plan: See above.   Echo.   On ASA and Brilinta.  resume Brilinta.     Problem/Plan - 5:  ·  Problem: Type 2 diabetes mellitus.   ·  Plan: See above.   FS S/S for now but would review FS for bedtime Lantus if required.     Problem/Plan - 6:  ·  Problem: Abnormal weight loss.   ·  Plan: See above.    Would consider clarifying history and recent workup from last hospitalization at [facility].  ian/Plan - 7:  ·  Problem: Pulmonary nodules.   ·  Plan: Reviewed with daughter, subcentimeter nodules will need outpatient followup as outpatient--previously followed by Dr. Wale Huffman.     Problem/Plan - 8:  ·  Problem: Need for prophylactic measure.   ·  Plan: Daughter agrees to pharmacologic DVT prophylaxis.     Problem/Plan - 9:  ·  Problem: Hypothyroidism.   ·  Plan: Check TSH.   Will provide parenteral equivalent of Synthroid.     ·  Problem: Acute cholecystitis.   ·  Plan: See above.   Seen by general surgery.   Will continue with zosyn as above.  not a candidate for sx neither perc choly , d/s to [facility] with po abx , Augmentin.     Problem/Plan - 2:  ·  Problem: History of pyelonephritis.   ·  Plan: See above.   Urinalysis and urine culture, blood cultures ordered.    Proper renal US ordered.     Problem/Plan - 3:  ·  Problem: Cerebrovascular disease.   ·  Plan: See above.   CTT head no contrast ordered.   No appreciable focal deficit but moderate cognitive impairment.     Problem/Plan - 4:  ·  Problem: History of congestive heart failure.   ·  Plan: See above.   Echo.   On ASA and Brilinta.  resume Brilinta.     Problem/Plan - 5:  ·  Problem: Type 2 diabetes mellitus.   ·  Plan: See above.   FS S/S for now but would review FS for bedtime Lantus if required.     Problem/Plan - 6:  ·  Problem: Abnormal weight loss.   ·  Plan: See above.    Would consider clarifying history and recent workup from last hospitalization at [facility].  ian/Plan - 7:  ·  Problem: Pulmonary nodules.   ·  Plan: Reviewed with daughter, subcentimeter nodules will need outpatient followup as outpatient--previously followed by Dr. Wale Huffman.     Problem/Plan - 8:  ·  Problem: Need for prophylactic measure.   ·  Plan: Daughter agrees to pharmacologic DVT prophylaxis.     Problem/Plan - 9:  ·  Problem: Hypothyroidism.   ·  Plan: Check TSH.   Will provide parenteral equivalent of Synthroid.

## 2023-01-25 NOTE — PROGRESS NOTE ADULT - PROVIDER SPECIALTY LIST ADULT
Cardiology
Cardiology
Infectious Disease
Surgery
Cardiology
Internal Medicine
Surgery
Surgery
Infectious Disease
Internal Medicine

## 2023-01-25 NOTE — PROGRESS NOTE ADULT - PROBLEM SELECTOR PROBLEM 4
History of congestive heart failure

## 2023-01-25 NOTE — PROGRESS NOTE ADULT - PROBLEM SELECTOR PLAN 5
See above.   FS S/S for now but would review FS for bedtime Lantus if required.

## 2023-01-25 NOTE — PROGRESS NOTE ADULT - PROBLEM SELECTOR PROBLEM 3
Cerebrovascular disease

## 2023-01-25 NOTE — PROGRESS NOTE ADULT - PROBLEM SELECTOR PLAN 1
See above.   Seen by general surgery.   Will continue with Invanz as above.    Would consider formal ID evaluation in the AM.    NPO x medications.
See above.   Seen by general surgery.   Will continue with zosyn as above.  not a candidate for sx neither perc choly , d/s to [facility] with po abx , Augmentin.
See above.   Seen by general surgery.   Will continue with zosyn as above.  not a candidate for sx neither perc choly , d/s to [facility] with po abx , Augmentin.

## 2023-01-25 NOTE — DISCHARGE NOTE PROVIDER - NSDCMRMEDTOKEN_GEN_ALL_CORE_FT
acetaminophen 325 mg oral tablet:   alendronate 70 mg oral tablet: 1 tab(s) orally once a week  amLODIPine 10 mg oral tablet: 1 tab(s) orally once a day  Aspirin Enteric Coated 81 mg oral delayed release tablet: 1 tab(s) orally once a day  ergocalciferol 1.25 mg (50,000 intl units) oral tablet: 1 tab(s) orally every 30 days  HumaLOG KwikPen 100 units/mL injectable solution: 20 unit(s) injectable 3 times a day (before meals)  Jardiance 10 mg oral tablet: 1 tab(s) orally once a day (in the morning)  ketoconazole 2% topical cream: Apply topically to affected area on both feet twice a day  lactulose 10 g/15 mL oral solution: 30 ml orally twice a day for constipation  Synthroid 50 mcg (0.05 mg) oral tablet: 1 tab(s) orally once a day  ticagrelor 90 mg oral tablet: 1 tab(s) orally 2 times a day   acetaminophen 325 mg oral tablet:   alendronate 70 mg oral tablet: 1 tab(s) orally once a week  aspirin 81 mg oral delayed release tablet: 1 tab(s) orally once a day  ergocalciferol 1.25 mg (50,000 intl units) oral tablet: 1 tab(s) orally every 30 days  glucose 40% oral gel: 1 dose(s) orally once a day, As Needed  for hypoglycemia  Jardiance 10 mg oral tablet: 1 tab(s) orally once a day (in the morning)  metoprolol succinate 25 mg oral tablet, extended release: 1 tab(s) orally once a day  Synthroid 50 mcg (0.05 mg) oral tablet: 1 tab(s) orally once a day  ticagrelor 90 mg oral tablet: 1 tab(s) orally every 12 hours   acetaminophen 325 mg oral tablet:   alendronate 70 mg oral tablet: 1 tab(s) orally once a week  amoxicillin-clavulanate 875 mg-125 mg oral tablet: 1 tab(s) orally 2 times a day  aspirin 81 mg oral delayed release tablet: 1 tab(s) orally once a day  ergocalciferol 1.25 mg (50,000 intl units) oral tablet: 1 tab(s) orally every 30 days  glucose 40% oral gel: 1 dose(s) orally once a day, As Needed  for hypoglycemia  heparin: 5000 unit(s) subcutaneous 2 times a day  Jardiance 10 mg oral tablet: 1 tab(s) orally once a day (in the morning)  metoprolol succinate 25 mg oral tablet, extended release: 1 tab(s) orally once a day  mupirocin topical: 1 dose(s) in each nostril 2 times a day   Synthroid 50 mcg (0.05 mg) oral tablet: 1 tab(s) orally once a day  ticagrelor 90 mg oral tablet: 1 tab(s) orally every 12 hours

## 2023-01-25 NOTE — DISCHARGE NOTE PROVIDER - PROVIDER TOKENS
PROVIDER:[TOKEN:[[id number]],FOLLOWUP:[1 week]],PROVIDER:[TOKEN:[[id number]],FOLLOWUP:[Routine]],PROVIDER:[TOKEN:[[id number]],FOLLOWUP:[Routine]] PROVIDER:[TOKEN:[[id number]],FOLLOWUP:[1 week]],PROVIDER:[TOKEN:[[id number]],FOLLOWUP:[Routine]],PROVIDER:[TOKEN:[[id number]],FOLLOWUP:[Routine]] PROVIDER:[TOKEN:[[id number]],FOLLOWUP:[1 week]],PROVIDER:[TOKEN:[[id number]],FOLLOWUP:[Routine]],PROVIDER:[TOKEN:[[id number]],FOLLOWUP:[Routine]]

## 2023-01-25 NOTE — DISCHARGE NOTE PROVIDER - NSDCCPCAREPLAN_GEN_ALL_CORE_FT
PRINCIPAL DISCHARGE DIAGNOSIS  Diagnosis: Cholecystitis  Assessment and Plan of Treatment: evaluated by surgery; no inpatient surgery planned or cholecystostomy tube   Antibiotics began inpatient; changed to oral @ D/C thru 1/31 per ID  followup Dr. Munoz 1-2 weeks

## 2023-01-25 NOTE — PROGRESS NOTE ADULT - PROBLEM SELECTOR PROBLEM 6
Abnormal weight loss

## 2023-01-25 NOTE — PROGRESS NOTE ADULT - REASON FOR ADMISSION
Sent from [facility] for RUQ abdominal pain since 1/15/23 Sent from [facility] for RUQ abdominal pain since 1/15/23 Sent from [facility] for RUQ abdominal pain since 1/15/23

## 2023-01-25 NOTE — DISCHARGE NOTE PROVIDER - REASON FOR ADMISSION
Sent from [facility] for RUQ abdominal pain since 1/15/23 Sent from [facility] for RUQ abdominal pain since 1/15/23 Sent from [facility] for RUQ abdominal pain since 1/15/23

## 2023-01-25 NOTE — PROGRESS NOTE ADULT - PROBLEM SELECTOR PLAN 9
Check TSH.   Will provide parenteral equivalent of Synthroid.
within normal limits

## 2023-01-25 NOTE — PROGRESS NOTE ADULT - PROBLEM SELECTOR PLAN 3
See above.   CTT head no contrast ordered.   No appreciable focal deficit but moderate cognitive impairment.   Would consider formal Neurology evaluation in the AM.
See above.   CTT head no contrast ordered.   No appreciable focal deficit but moderate cognitive impairment.
See above.   CTT head no contrast ordered.   No appreciable focal deficit but moderate cognitive impairment.   Would consider formal Neurology evaluation in the AM.
See above.   CTT head no contrast ordered.   No appreciable focal deficit but moderate cognitive impairment.
See above.   CTT head no contrast ordered.   No appreciable focal deficit but moderate cognitive impairment.   Would consider formal Neurology evaluation in the AM.

## 2023-01-25 NOTE — PROGRESS NOTE ADULT - REASON FOR ADMISSION
Sent from [facility] for RUQ abdominal pain since 1/15/23 Sent from [facility] for RUQ abdominal pain since 1/15/23 Sent from [facility] for RUQ abdominal pain since 1/15/23

## 2023-01-25 NOTE — PROGRESS NOTE ADULT - REASON FOR ADMISSION
Sent from [facility] for RUQ abdominal pain since 1/15/23 Sent from [facility] for RUQ abdominal pain since 1/15/23 Sent from [facility] for RUQ abdominal pain since 1/15/23

## 2023-01-25 NOTE — PROGRESS NOTE ADULT - SUBJECTIVE AND OBJECTIVE BOX
CARDIOLOGY     PROGRESS  NOTE   ________________________________________________    CHIEF COMPLAINT:Patient is a 82y old  Female who presents with a chief complaint of Sent [facility] for RUQ abdominal pain since 1/15/23 (24 Jan 2023 17:37)  no complain  	  REVIEW OF SYSTEMS:  CONSTITUTIONAL: No fever, weight loss, or fatigue  EYES: No eye pain, visual disturbances, or discharge  ENT:  No difficulty hearing, tinnitus, vertigo; No sinus or throat pain  NECK: No pain or stiffness  RESPIRATORY: No cough, wheezing, chills or hemoptysis; No Shortness of Breath  CARDIOVASCULAR: No chest pain, palpitations, passing out, dizziness, or leg swelling  GASTROINTESTINAL: No abdominal or epigastric pain. No nausea, vomiting, or hematemesis; No diarrhea or constipation. No melena or hematochezia.  GENITOURINARY: No dysuria, frequency, hematuria, or incontinence  NEUROLOGICAL: No headaches, memory loss, loss of strength, numbness, or tremors  SKIN: No itching, burning, rashes, or lesions   LYMPH Nodes: No enlarged glands  ENDOCRINE: No heat or cold intolerance; No hair loss  MUSCULOSKELETAL: No joint pain or swelling; No muscle, back, or extremity pain  PSYCHIATRIC: No depression, anxiety, mood swings, or difficulty sleeping  HEME/LYMPH: No easy bruising, or bleeding gums  ALLERGY AND IMMUNOLOGIC: No hives or eczema	    [ ] All others negative	  [x ] Unable to obtain    PHYSICAL EXAM:  T(C): 36.6 (01-25-23 @ 05:30), Max: 36.8 (01-24-23 @ 09:36)  HR: 79 (01-25-23 @ 05:30) (79 - 85)  BP: 123/70 (01-25-23 @ 05:30) (107/67 - 147/83)  RR: 18 (01-25-23 @ 05:30) (17 - 18)  SpO2: 95% (01-25-23 @ 05:30) (95% - 97%)  Wt(kg): --  I&O's Summary    24 Jan 2023 07:01  -  25 Jan 2023 07:00  --------------------------------------------------------  IN: 0 mL / OUT: 450 mL / NET: -450 mL        Appearance: Normal	  HEENT:   Normal oral mucosa, PERRL, EOMI	  Lymphatic: No lymphadenopathy  Cardiovascular: Normal S1 S2, No JVD, + murmurs, No edema  Respiratory: rhonchi  Psychiatry: A & O x 3, Mood & affect appropriate  Gastrointestinal:  Soft, Non-tender, + BS	  Skin: No rashes, No ecchymoses, No cyanosis	  Neurologic: Non-focal  Extremities: Normal range of motion, No clubbing, cyanosis or edema  Vascular: Peripheral pulses palpable 2+ bilaterally    MEDICATIONS  (STANDING):  aspirin enteric coated 81 milliGRAM(s) Oral daily  chlorhexidine 2% Cloths 1 Application(s) Topical <User Schedule>  dextrose 5%. 1000 milliLiter(s) (50 mL/Hr) IV Continuous <Continuous>  dextrose 5%. 1000 milliLiter(s) (100 mL/Hr) IV Continuous <Continuous>  dextrose 50% Injectable 25 Gram(s) IV Push once  dextrose 50% Injectable 12.5 Gram(s) IV Push once  dextrose 50% Injectable 25 Gram(s) IV Push once  glucagon  Injectable 1 milliGRAM(s) IntraMuscular once  heparin   Injectable 5000 Unit(s) SubCutaneous <User Schedule>  insulin lispro (ADMELOG) corrective regimen sliding scale   SubCutaneous at bedtime  insulin lispro (ADMELOG) corrective regimen sliding scale   SubCutaneous three times a day before meals  levothyroxine Injectable 25 MICROGram(s) IV Push at bedtime  metoprolol succinate ER 25 milliGRAM(s) Oral daily  piperacillin/tazobactam IVPB.. 3.375 Gram(s) IV Intermittent every 8 hours  sodium chloride 0.9%. 1000 milliLiter(s) (70 mL/Hr) IV Continuous <Continuous>  ticagrelor 90 milliGRAM(s) Oral every 12 hours      TELEMETRY: 	    ECG:  	  RADIOLOGY:  OTHER: 	  	  LABS:	 	    CARDIAC MARKERS:                                11.6   8.11  )-----------( 316      ( 24 Jan 2023 07:01 )             37.2     01-24    138  |  102  |  12  ----------------------------<  103<H>  3.7   |  20<L>  |  0.91    Ca    8.9      24 Jan 2023 06:59    TPro  6.6  /  Alb  2.6<L>  /  TBili  0.5  /  DBili  x   /  AST  21  /  ALT  9<L>  /  AlkPhos  139<H>  01-23    proBNP:   Lipid Profile: Cholesterol --  LDL --  HDL --      HgA1c:   TSH: Thyroid Stimulating Hormone, Serum: 4.62 uIU/mL (01-19 @ 07:45)          Assessment and plan  ---------------------------   81 y/o F--history from patient not reliable and obtained from patient's adult daughter above by me--patient with a history of an apparently complicated course of hospitalization at [facility]  in Nov 2022 with apparently severe septic shock requiring vasopressors from a complicated pyelonephritis RIGHT with hypotension and respiratory failure S/P intubation /extubation, COVID-19 + at the time (patient COVID-19 vaccinated x 3), with apparent MI at the time and 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  Daughter reports that the patient was [facility] with patient with impaired functional status following hospitalization, along with development of cognitive impairment.  Unclear if patient had a perinephric abscess at the time with drainage (?).   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 Wale Huffman at [facility] in Mar 2016 (I reviewed HIE Office notes).    Patient now sent from the SNF following apparently 4 days of  RUQ and RIGHT flank pain with poor PO, nausea.  No chest pain/pressure.  NO fever, no chills, no rigors.  No red blood per rectum or melena.  No diaphoresis.   pt with hx of htn, ashd, cva, pt had stent in 2020, pt apparently had increase trop and was started empirically on Brilanta?? since november  if pt needs surgery or intervention may hold Brilanta, will discuss with her cardiologist Dr blum   will adjust cardiac meds  continue asa 81 mg daily  continue abx  beta blocker as tolerated  spoke to her daughter   GI eval  will plan for further cardiac michael  discussed with family last stent 3 years ago, pt was started on Brilanta on the last admission with no cardiac michael sec to increase trop  awaiting echo  will consider ischemia michael prior to dc  add metoprolol er 25 mg daily  no cynthia tube re started o Brilanta ???  echo / ischemic michael    	                    CARDIOLOGY     PROGRESS  NOTE   ________________________________________________    CHIEF COMPLAINT:Patient is a 82y old  Female who presents with a chief complaint of [facility] for [facility]Q abdominal pain since 1/15/23 (24 Jan 2023 17:37)  no complain  	  REVIEW OF SYSTEMS:  CONSTITUTIONAL: No fever, weight loss, or fatigue  EYES: No eye pain, visual disturbances, or discharge  ENT:  No difficulty hearing, tinnitus, vertigo; No sinus or throat pain  NECK: No pain or stiffness  RESPIRATORY: No cough, wheezing, chills or hemoptysis; No Shortness of Breath  CARDIOVASCULAR: No chest pain, palpitations, passing out, dizziness, or leg swelling  GASTROINTESTINAL: No abdominal or epigastric pain. No nausea, vomiting, or hematemesis; No diarrhea or constipation. No melena or hematochezia.  GENITOURINARY: No dysuria, frequency, hematuria, or incontinence  NEUROLOGICAL: No headaches, memory loss, loss of strength, numbness, or tremors  SKIN: No itching, burning, rashes, or lesions   LYMPH Nodes: No enlarged glands  ENDOCRINE: No heat or cold intolerance; No hair loss  MUSCULOSKELETAL: No joint pain or swelling; No muscle, back, or extremity pain  PSYCHIATRIC: No depression, anxiety, mood swings, or difficulty sleeping  HEME/LYMPH: No easy bruising, or bleeding gums  ALLERGY AND IMMUNOLOGIC: No hives or eczema	    [ ] All others negative	  [x ] Unable to obtain    PHYSICAL EXAM:  T(C): 36.6 (01-25-23 @ 05:30), Max: 36.8 (01-24-23 @ 09:36)  HR: 79 (01-25-23 @ 05:30) (79 - 85)  BP: 123/70 (01-25-23 @ 05:30) (107/67 - 147/83)  RR: 18 (01-25-23 @ 05:30) (17 - 18)  SpO2: 95% (01-25-23 @ 05:30) (95% - 97%)  Wt(kg): --  I&O's Summary    24 Jan 2023 07:01  -  25 Jan 2023 07:00  --------------------------------------------------------  IN: 0 mL / OUT: 450 mL / NET: -450 mL        Appearance: Normal	  HEENT:   Normal oral mucosa, PERRL, EOMI	  Lymphatic: No lymphadenopathy  Cardiovascular: Normal S1 S2, No JVD, + murmurs, No edema  Respiratory: rhonchi  Psychiatry: A & O x 3, Mood & affect appropriate  Gastrointestinal:  Soft, Non-tender, + BS	  Skin: No rashes, No ecchymoses, No cyanosis	  Neurologic: Non-focal  Extremities: Normal range of motion, No clubbing, cyanosis or edema  Vascular: Peripheral pulses palpable 2+ bilaterally    MEDICATIONS  (STANDING):  aspirin enteric coated 81 milliGRAM(s) Oral daily  chlorhexidine 2% Cloths 1 Application(s) Topical <User Schedule>  dextrose 5%. 1000 milliLiter(s) (50 mL/Hr) IV Continuous <Continuous>  dextrose 5%. 1000 milliLiter(s) (100 mL/Hr) IV Continuous <Continuous>  dextrose 50% Injectable 25 Gram(s) IV Push once  dextrose 50% Injectable 12.5 Gram(s) IV Push once  dextrose 50% Injectable 25 Gram(s) IV Push once  glucagon  Injectable 1 milliGRAM(s) IntraMuscular once  heparin   Injectable 5000 Unit(s) SubCutaneous <User Schedule>  insulin lispro (ADMELOG) corrective regimen sliding scale   SubCutaneous at bedtime  insulin lispro (ADMELOG) corrective regimen sliding scale   SubCutaneous three times a day before meals  levothyroxine Injectable 25 MICROGram(s) IV Push at bedtime  metoprolol succinate ER 25 milliGRAM(s) Oral daily  piperacillin/tazobactam IVPB.. 3.375 Gram(s) IV Intermittent every 8 hours  sodium chloride 0.9%. 1000 milliLiter(s) (70 mL/Hr) IV Continuous <Continuous>  ticagrelor 90 milliGRAM(s) Oral every 12 hours      TELEMETRY: 	    ECG:  	  RADIOLOGY:  OTHER: 	  	  LABS:	 	    CARDIAC MARKERS:                                11.6   8.11  )-----------( 316      ( 24 Jan 2023 07:01 )             37.2     01-24    138  |  102  |  12  ----------------------------<  103<H>  3.7   |  20<L>  |  0.91    Ca    8.9      24 Jan 2023 06:59    TPro  6.6  /  Alb  2.6<L>  /  TBili  0.5  /  DBili  x   /  AST  21  /  ALT  9<L>  /  AlkPhos  139<H>  01-23    proBNP:   Lipid Profile: Cholesterol --  LDL --  HDL --      HgA1c:   TSH: Thyroid Stimulating Hormone, Serum: 4.62 uIU/mL (01-19 @ 07:45)          Assessment and plan  ---------------------------   81 y/o F--history from patient not reliable and obtained from patient's adult daughter above by me--patient with a history of an apparently complicated course of hospitalization at [facility]  in Nov 2022 with apparently severe septic shock requiring vasopressors from a complicated pyelonephritis RIGHT with hypotension and respiratory failure S/P intubation /extubation, COVID-19 + at the time (patient COVID-19 vaccinated x 3), with apparent MI at the time and 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  Daughter reports that the patient was [facility] with patient with impaired functional status following hospitalization, along with development of cognitive impairment.  Unclear if patient had a perinephric abscess at the time with drainage (?).   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 Wale Huffman at [facility] in Mar 2016 (I reviewed [facility] Office notes).    Patient now sent from the SNF following apparently 4 days of  RUQ and RIGHT flank pain with poor PO, nausea.  No chest pain/pressure.  NO fever, no chills, no rigors.  No red blood per rectum or melena.  No diaphoresis.   pt with hx of htn, ashd, cva, pt had stent in 2020, pt apparently had increase trop and was started empirically on Brilanta?? since november  if pt needs surgery or intervention may hold Brilanta, will discuss with her cardiologist Dr blum   will adjust cardiac meds  continue asa 81 mg daily  continue abx  beta blocker as tolerated  spoke to her daughter   GI eval  will plan for further cardiac michael  discussed with family last stent 3 years ago, pt was started on Brilanta on the last admission with no cardiac michael sec to increase trop  awaiting echo  will consider ischemia michael prior to dc  add metoprolol er 25 mg daily  no cynthia tube re started o Brilanta ???  echo / ischemic michael    	                    CARDIOLOGY     PROGRESS  NOTE   ________________________________________________    CHIEF COMPLAINT:Patient is a 82y old  Female who presents with a chief complaint of [facility] for RUQ abdominal pain since 1/15/23 (24 Jan 2023 17:37)  no complain  	  REVIEW OF SYSTEMS:  CONSTITUTIONAL: No fever, weight loss, or fatigue  EYES: No eye pain, visual disturbances, or discharge  ENT:  No difficulty hearing, tinnitus, vertigo; No sinus or throat pain  NECK: No pain or stiffness  RESPIRATORY: No cough, wheezing, chills or hemoptysis; No Shortness of Breath  CARDIOVASCULAR: No chest pain, palpitations, passing out, dizziness, or leg swelling  GASTROINTESTINAL: No abdominal or epigastric pain. No nausea, vomiting, or hematemesis; No diarrhea or constipation. No melena or hematochezia.  GENITOURINARY: No dysuria, frequency, hematuria, or incontinence  NEUROLOGICAL: No headaches, memory loss, loss of strength, numbness, or tremors  SKIN: No itching, burning, rashes, or lesions   LYMPH Nodes: No enlarged glands  ENDOCRINE: No heat or cold intolerance; No hair loss  MUSCULOSKELETAL: No joint pain or swelling; No muscle, back, or extremity pain  PSYCHIATRIC: No depression, anxiety, mood swings, or difficulty sleeping  HEME/LYMPH: No easy bruising, or bleeding gums  ALLERGY AND IMMUNOLOGIC: No hives or eczema	    [ ] All others negative	  [x ] Unable to obtain    PHYSICAL EXAM:  T(C): 36.6 (01-25-23 @ 05:30), Max: 36.8 (01-24-23 @ 09:36)  HR: 79 (01-25-23 @ 05:30) (79 - 85)  BP: 123/70 (01-25-23 @ 05:30) (107/67 - 147/83)  RR: 18 (01-25-23 @ 05:30) (17 - 18)  SpO2: 95% (01-25-23 @ 05:30) (95% - 97%)  Wt(kg): --  I&O's Summary    24 Jan 2023 07:01  -  25 Jan 2023 07:00  --------------------------------------------------------  IN: 0 mL / OUT: 450 mL / NET: -450 mL        Appearance: Normal	  HEENT:   Normal oral mucosa, PERRL, EOMI	  Lymphatic: No lymphadenopathy  Cardiovascular: Normal S1 S2, No JVD, + murmurs, No edema  Respiratory: rhonchi  Psychiatry: A & O x 3, Mood & affect appropriate  Gastrointestinal:  Soft, Non-tender, + BS	  Skin: No rashes, No ecchymoses, No cyanosis	  Neurologic: Non-focal  Extremities: Normal range of motion, No clubbing, cyanosis or edema  Vascular: Peripheral pulses palpable 2+ bilaterally    MEDICATIONS  (STANDING):  aspirin enteric coated 81 milliGRAM(s) Oral daily  chlorhexidine 2% Cloths 1 Application(s) Topical <User Schedule>  dextrose 5%. 1000 milliLiter(s) (50 mL/Hr) IV Continuous <Continuous>  dextrose 5%. 1000 milliLiter(s) (100 mL/Hr) IV Continuous <Continuous>  dextrose 50% Injectable 25 Gram(s) IV Push once  dextrose 50% Injectable 12.5 Gram(s) IV Push once  dextrose 50% Injectable 25 Gram(s) IV Push once  glucagon  Injectable 1 milliGRAM(s) IntraMuscular once  heparin   Injectable 5000 Unit(s) SubCutaneous <User Schedule>  insulin lispro (ADMELOG) corrective regimen sliding scale   SubCutaneous at bedtime  insulin lispro (ADMELOG) corrective regimen sliding scale   SubCutaneous three times a day before meals  levothyroxine Injectable 25 MICROGram(s) IV Push at bedtime  metoprolol succinate ER 25 milliGRAM(s) Oral daily  piperacillin/tazobactam IVPB.. 3.375 Gram(s) IV Intermittent every 8 hours  sodium chloride 0.9%. 1000 milliLiter(s) (70 mL/Hr) IV Continuous <Continuous>  ticagrelor 90 milliGRAM(s) Oral every 12 hours      TELEMETRY: 	    ECG:  	  RADIOLOGY:  OTHER: 	  	  LABS:	 	    CARDIAC MARKERS:                                11.6   8.11  )-----------( 316      ( 24 Jan 2023 07:01 )             37.2     01-24    138  |  102  |  12  ----------------------------<  103<H>  3.7   |  20<L>  |  0.91    Ca    8.9      24 Jan 2023 06:59    TPro  6.6  /  Alb  2.6<L>  /  TBili  0.5  /  DBili  x   /  AST  21  /  ALT  9<L>  /  AlkPhos  139<H>  01-23    proBNP:   Lipid Profile: Cholesterol --  LDL --  HDL --      HgA1c:   TSH: Thyroid Stimulating Hormone, Serum: 4.62 uIU/mL (01-19 @ 07:45)          Assessment and plan  ---------------------------   83 y/o F--history from patient not reliable and obtained from patient's adult daughter above by me--patient with a history of an apparently complicated course of hospitalization at [facility]  in Nov 2022 with apparently severe septic shock requiring vasopressors from a complicated pyelonephritis RIGHT with hypotension and respiratory failure S/P intubation /extubation, COVID-19 + at the time (patient COVID-19 vaccinated x 3), with apparent MI at the time and 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  Daughter reports that the patient was [facility] with patient with impaired functional status following hospitalization, along with development of cognitive impairment.  Unclear if patient had a perinephric abscess at the time with drainage (?).   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 Wale Huffman at [facility] in Mar 2016 (I reviewed [facility] Office notes).    Patient now sent from the SNF following apparently 4 days of  RUQ and RIGHT flank pain with poor PO, nausea.  No chest pain/pressure.  NO fever, no chills, no rigors.  No red blood per rectum or melena.  No diaphoresis.   pt with hx of htn, ashd, cva, pt had stent in 2020, pt apparently had increase trop and was started empirically on Brilanta?? since november  if pt needs surgery or intervention may hold Brilanta, will discuss with her cardiologist Dr blum   will adjust cardiac meds  continue asa 81 mg daily  continue abx  beta blocker as tolerated  spoke to her daughter   GI eval  will plan for further cardiac michael  discussed with family last stent 3 years ago, pt was started on Brilanta on the last admission with no cardiac michael sec to increase trop  awaiting echo  will consider ischemia michael prior to dc  add metoprolol er 25 mg daily  no cynthia tube re started o Brilanta ???  echo / ischemic michael

## 2023-01-25 NOTE — PROGRESS NOTE ADULT - ASSESSMENT
82 f with HTN, CAD, CHF, hospitalization at [facility] 11/2022 for septic shock due to citrobacter bacteremia and R pyelo c/b resp failure, MI and CVA, now p/w RUQ pain and nausea  here afebrile, WBC: 12  abd u/s:  Distended gallbladder and gallbladder wall thickening in the setting of positive sonographic Pond sign compatible with acute cholecystitis. Small right upper quadrant ascites.  abd/pelvis CT:  Gallbladder distention with marked wall thickening and surrounding inflammatory changes/fluid. Probable layering sludge, concerning for acute cholecystitis. Striated appearance of the kidneys, right greater than left likely reflects infection/pyelonephritis.     leukocytosis, RUQ pain due to acute cholecystitis  blood cx negative,  surgery stated no surgical interventions in view pt's comorbidities and IR stated no IR perc cynthia for risk of bleeding   * now pt improved clinically and WBC normalized  * c/w zosyn while in the hospital, will complete a 2 week course and when pt is ready for discharge will switch to augmentin now day 8 of antibiotics  * pt will need an ultimate surgical intervention for cholecystitis f/u with surgery as outpatient     The above assessment and plan was discussed with the primary team    Daniella Ribeiro MD  contact on teams  After 5pm and on weekends call [phone]       82 f with HTN, CAD, CHF, hospitalization at [facility] 11/2022 for septic shock due to citrobacter bacteremia and R pyelo c/b resp failure, MI and CVA, now p/w RUQ pain and nausea  here afebrile, WBC: 12  abd u/s:  Distended gallbladder and gallbladder wall thickening in the setting of positive sonographic Pond sign compatible with acute cholecystitis. Small right upper quadrant ascites.  abd/pelvis CT:  Gallbladder distention with marked wall thickening and surrounding inflammatory changes/fluid. Probable layering sludge, concerning for acute cholecystitis. Striated appearance of the kidneys, right greater than left likely reflects infection/pyelonephritis.     leukocytosis, RUQ pain due to acute cholecystitis  blood cx negative,  surgery stated no surgical interventions in view pt's comorbidities and IR stated no IR perc cynthia for risk of bleeding   * now pt improved clinically and WBC normalized  * c/w zosyn while in the hospital, will complete a 2 week course and when pt is ready for discharge will switch to augmentin now day 8 of antibiotics  * pt will need an ultimate surgical intervention for cholecystitis f/u with surgery as outpatient     The above assessment and plan was discussed with the primary team    Daniella Ribeiro MD  contact on teams  After 5pm and on weekends call [phone]       82 f with HTN, CAD, CHF, hospitalization at [facility] 11/2022 for septic shock due to citrobacter bacteremia and R pyelo c/b resp failure, MI and CVA, now p/w RUQ pain and nausea  here afebrile, WBC: 12  abd u/s:  Distended gallbladder and gallbladder wall thickening in the setting of positive sonographic Pond sign compatible with acute cholecystitis. Small right upper quadrant ascites.  abd/pelvis CT:  Gallbladder distention with marked wall thickening and surrounding inflammatory changes/fluid. Probable layering sludge, concerning for acute cholecystitis. Striated appearance of the kidneys, right greater than left likely reflects infection/pyelonephritis.     leukocytosis, RUQ pain due to acute cholecystitis  blood cx negative,  surgery stated no surgical interventions in view pt's comorbidities and IR stated no IR perc cynthia for risk of bleeding   * now pt improved clinically and WBC normalized  * c/w zosyn while in the hospital, will complete a 2 week course and when pt is ready for discharge will switch to augmentin now day 8 of antibiotics  * pt will need an ultimate surgical intervention for cholecystitis f/u with surgery as outpatient     The above assessment and plan was discussed with the primary team    Daniella Ribeiro MD  contact on teams  After 5pm and on weekends call [phone]

## 2023-01-25 NOTE — PROGRESS NOTE ADULT - PROBLEM SELECTOR PLAN 4
See above.   Echo.   On ASA and Brilinta.  resume Brilinta
See above.   Echo.   On ASA and Brilinta.  resume Brilinta
See above.   Echo.   On ASA and Brilinta.   Would clarify patient's Jardiance in the AM--to confirm use for CHF.   Would consider formal Cardiology opinion in the AM.

## 2023-01-25 NOTE — PROGRESS NOTE ADULT - PROBLEM SELECTOR PLAN 6
See above.    Would consider clarifying history and recent workup from last hospitalization at [facility]. See above.    Would consider clarifying history and recent workup from last hospitalization at [facility]. See above.    Would consider clarifying history and recent workup from last hospitalization at [facility].

## 2023-01-25 NOTE — PROGRESS NOTE ADULT - PROBLEM SELECTOR PLAN 7
Reviewed with daughter, subcentimeter nodules will need outpatient followup as outpatient--previously followed by Dr. Wale Huffman. Reviewed with daughter, subcentimeter nodules will need outpatient followup as outpatient--previously followed by Dr. Wale uHffman.

## 2023-01-25 NOTE — PROGRESS NOTE ADULT - NUTRITIONAL ASSESSMENT
This patient has been assessed with a concern for Malnutrition and has been determined to have a diagnosis/diagnoses of Severe protein-calorie malnutrition.    This patient is being managed with:   Diet Regular-  Consistent Carbohydrate {Evening Snack} (CSTCHOSN)  Low Fat (LOWFAT)  Entered: Jan 20 2023  7:14PM    The following pending diet order is being considered for treatment of Severe protein-calorie malnutrition:  Diet Easy to Chew-  Low Fat (LOWFAT)  Supplement Feeding Modality:  Oral  Glucerna Shake Cans or Servings Per Day:  2       Frequency:  Daily  Entered: Jan 21 2023  3:34PM  
This patient has been assessed with a concern for Malnutrition and has been determined to have a diagnosis/diagnoses of Severe protein-calorie malnutrition.    This patient is being managed with:   Diet Regular-  Consistent Carbohydrate {Evening Snack} (CSTCHOSN)  Low Fat (LOWFAT)  Entered: Jan 20 2023  7:14PM    The following pending diet order is being considered for treatment of Severe protein-calorie malnutrition:  Diet Soft and Bite Sized-  Low Fat (LOWFAT)  Liquid Protein Supplement     Qty per Day:  1  Supplement Feeding Modality:  Oral  Ensure Clear Cans or Servings Per Day:  2       Frequency:  Daily  Entered: Jan 24 2023  4:52PM    Diet Easy to Chew-  Low Fat (LOWFAT)  Supplement Feeding Modality:  Oral  Glucerna Shake Cans or Servings Per Day:  2       Frequency:  Daily  Entered: Jan 21 2023  3:34PM  
This patient has been assessed with a concern for Malnutrition and has been determined to have a diagnosis/diagnoses of Severe protein-calorie malnutrition.    This patient is being managed with:   Diet Regular-  Consistent Carbohydrate {Evening Snack} (CSTCHOSN)  Low Fat (LOWFAT)  Entered: Jan 20 2023  7:14PM    The following pending diet order is being considered for treatment of Severe protein-calorie malnutrition:  Diet Soft and Bite Sized-  Low Fat (LOWFAT)  Liquid Protein Supplement     Qty per Day:  1  Supplement Feeding Modality:  Oral  Ensure Clear Cans or Servings Per Day:  2       Frequency:  Daily  Entered: Jan 24 2023  4:52PM    Diet Easy to Chew-  Low Fat (LOWFAT)  Supplement Feeding Modality:  Oral  Glucerna Shake Cans or Servings Per Day:  2       Frequency:  Daily  Entered: Jan 21 2023  3:34PM  
This patient has been assessed with a concern for Malnutrition and has been determined to have a diagnosis/diagnoses of Severe protein-calorie malnutrition.    This patient is being managed with:   Diet Regular-  Consistent Carbohydrate {Evening Snack} (CSTCHOSN)  Low Fat (LOWFAT)  Entered: Jan 20 2023  7:14PM    The following pending diet order is being considered for treatment of Severe protein-calorie malnutrition:  Diet Easy to Chew-  Low Fat (LOWFAT)  Supplement Feeding Modality:  Oral  Glucerna Shake Cans or Servings Per Day:  2       Frequency:  Daily  Entered: Jan 21 2023  3:34PM  
This patient has been assessed with a concern for Malnutrition and has been determined to have a diagnosis/diagnoses of Severe protein-calorie malnutrition.    This patient is being managed with:   Diet Regular-  Consistent Carbohydrate {Evening Snack} (CSTCHOSN)  Low Fat (LOWFAT)  Entered: Jan 20 2023  7:14PM    The following pending diet order is being considered for treatment of Severe protein-calorie malnutrition:  Diet Easy to Chew-  Low Fat (LOWFAT)  Supplement Feeding Modality:  Oral  Glucerna Shake Cans or Servings Per Day:  2       Frequency:  Daily  Entered: Jan 21 2023  3:34PM

## 2023-12-06 RX ORDER — KETOCONAZOLE 20 MG/G
1 AEROSOL, FOAM TOPICAL
Qty: 0 | Refills: 0 | DISCHARGE

## 2023-12-06 RX ORDER — ACETAMINOPHEN 500 MG
0 TABLET ORAL
Qty: 0 | Refills: 0 | DISCHARGE

## 2023-12-06 RX ORDER — TICAGRELOR 90 MG/1
1 TABLET ORAL
Qty: 0 | Refills: 0 | DISCHARGE

## 2023-12-06 RX ORDER — ERGOCALCIFEROL 1.25 MG/1
0 CAPSULE ORAL
Qty: 0 | Refills: 0 | DISCHARGE

## 2023-12-06 RX ORDER — ALENDRONATE SODIUM 70 MG/1
1 TABLET ORAL
Qty: 0 | Refills: 0 | DISCHARGE

## 2023-12-06 RX ORDER — LACTULOSE 10 G/15ML
30 SOLUTION ORAL
Qty: 0 | Refills: 0 | DISCHARGE

## 2023-12-06 RX ORDER — INSULIN LISPRO 100/ML
20 VIAL (ML) SUBCUTANEOUS
Qty: 0 | Refills: 0 | DISCHARGE

## 2023-12-06 RX ORDER — EMPAGLIFLOZIN 10 MG/1
1 TABLET, FILM COATED ORAL
Qty: 0 | Refills: 0 | DISCHARGE

## 2023-12-06 RX ORDER — ERGOCALCIFEROL 1.25 MG/1
1 CAPSULE ORAL
Qty: 0 | Refills: 0 | DISCHARGE

## 2023-12-06 RX ORDER — LACTULOSE 10 G/15ML
0 SOLUTION ORAL
Qty: 0 | Refills: 0 | DISCHARGE

## 2023-12-06 RX ORDER — AMLODIPINE BESYLATE 2.5 MG/1
1 TABLET ORAL
Qty: 0 | Refills: 0 | DISCHARGE

## 2023-12-06 RX ORDER — ASPIRIN/CALCIUM CARB/MAGNESIUM 324 MG
1 TABLET ORAL
Qty: 0 | Refills: 0 | DISCHARGE

## 2023-12-06 RX ORDER — LEVOTHYROXINE SODIUM 125 MCG
1 TABLET ORAL
Qty: 0 | Refills: 0 | DISCHARGE

## 2024-03-04 NOTE — PROGRESS NOTE ADULT - REASON FOR ADMISSION
Sent from [facility] for RUQ abdominal pain since 1/15/23 Sent from [facility] for RUQ abdominal pain since 1/15/23 Sent from [facility] for RUQ abdominal pain since 1/15/23 Calm

## 2024-12-07 NOTE — PROGRESS NOTE ADULT - PROBLEM SELECTOR PLAN 2
Follow up with Orthopedic Hand Dr. Rodríguez within two days for further evaluation and management of care     Referral Placed. Stop at the  on your way out and schedule an appointment.    Non-weight bearing. Use sling for comfort and elevation of your arm while out of bed. Rest, ice, elevated    Prescription sent to pharmacy. Take medications as prescribed     Follow up instructions discussed. ED precautions discussed and advised to return to ED if symptoms worsen or persist for follow up.    
See above.   Urinalysis and urine culture, blood cultures ordered.    Proper renal US ordered.